# Patient Record
Sex: MALE | Race: BLACK OR AFRICAN AMERICAN | NOT HISPANIC OR LATINO | Employment: OTHER | RURAL
[De-identification: names, ages, dates, MRNs, and addresses within clinical notes are randomized per-mention and may not be internally consistent; named-entity substitution may affect disease eponyms.]

---

## 2021-06-24 ENCOUNTER — LAB REQUISITION (OUTPATIENT)
Dept: LAB | Facility: HOSPITAL | Age: 61
End: 2021-06-24
Attending: FAMILY MEDICINE
Payer: MEDICARE

## 2021-06-24 DIAGNOSIS — R41.81 AGE-RELATED COGNITIVE DECLINE: ICD-10-CM

## 2021-06-24 DIAGNOSIS — I10 ESSENTIAL (PRIMARY) HYPERTENSION: ICD-10-CM

## 2021-06-24 LAB
ALBUMIN SERPL BCP-MCNC: 4.5 G/DL (ref 3.5–5)
ALBUMIN/GLOB SERPL: 1.3 {RATIO}
ALP SERPL-CCNC: 94 U/L (ref 45–115)
ALT SERPL W P-5'-P-CCNC: 17 U/L (ref 16–61)
ANION GAP SERPL CALCULATED.3IONS-SCNC: 10 MMOL/L (ref 7–16)
AST SERPL W P-5'-P-CCNC: 13 U/L (ref 15–37)
BASOPHILS # BLD AUTO: 0.01 K/UL (ref 0–0.2)
BASOPHILS NFR BLD AUTO: 0.2 % (ref 0–1)
BILIRUB SERPL-MCNC: 0.9 MG/DL (ref 0–1.2)
BUN SERPL-MCNC: 9 MG/DL (ref 7–18)
BUN/CREAT SERPL: 8 (ref 6–20)
CALCIUM SERPL-MCNC: 10.3 MG/DL (ref 8.5–10.1)
CHLORIDE SERPL-SCNC: 106 MMOL/L (ref 98–107)
CO2 SERPL-SCNC: 34 MMOL/L (ref 21–32)
CREAT SERPL-MCNC: 1.11 MG/DL (ref 0.7–1.3)
DIFFERENTIAL METHOD BLD: ABNORMAL
EOSINOPHIL # BLD AUTO: 0.03 K/UL (ref 0–0.5)
EOSINOPHIL NFR BLD AUTO: 0.5 % (ref 1–4)
ERYTHROCYTE [DISTWIDTH] IN BLOOD BY AUTOMATED COUNT: 15.4 % (ref 11.5–14.5)
GLOBULIN SER-MCNC: 3.6 G/DL (ref 2–4)
GLUCOSE SERPL-MCNC: 87 MG/DL (ref 74–106)
HCT VFR BLD AUTO: 42.2 % (ref 40–54)
HGB BLD-MCNC: 14.4 G/DL (ref 13.5–18)
LYMPHOCYTES # BLD AUTO: 1.23 K/UL (ref 1–4.8)
LYMPHOCYTES NFR BLD AUTO: 20.2 % (ref 27–41)
MCH RBC QN AUTO: 26.2 PG (ref 27–31)
MCHC RBC AUTO-ENTMCNC: 34.1 G/DL (ref 32–36)
MCV RBC AUTO: 76.7 FL (ref 80–96)
MONOCYTES # BLD AUTO: 0.55 K/UL (ref 0–0.8)
MONOCYTES NFR BLD AUTO: 9 % (ref 2–6)
MPC BLD CALC-MCNC: 10.7 FL (ref 9.4–12.4)
NEUTROPHILS # BLD AUTO: 4.27 K/UL (ref 1.8–7.7)
NEUTROPHILS NFR BLD AUTO: 70.1 % (ref 53–65)
PLATELET # BLD AUTO: 158 K/UL (ref 150–400)
POTASSIUM SERPL-SCNC: 3.7 MMOL/L (ref 3.5–5.1)
PROT SERPL-MCNC: 8.1 G/DL (ref 6.4–8.2)
RBC # BLD AUTO: 5.5 M/UL (ref 4.6–6.2)
SODIUM SERPL-SCNC: 146 MMOL/L (ref 136–145)
WBC # BLD AUTO: 6.09 K/UL (ref 4.5–11)

## 2021-06-24 PROCEDURE — 80053 COMPREHEN METABOLIC PANEL: CPT | Performed by: FAMILY MEDICINE

## 2021-06-24 PROCEDURE — 85025 COMPLETE CBC W/AUTO DIFF WBC: CPT | Performed by: FAMILY MEDICINE

## 2021-08-26 ENCOUNTER — INFUSION (OUTPATIENT)
Dept: INFECTIOUS DISEASES | Facility: HOSPITAL | Age: 61
End: 2021-08-26
Attending: EMERGENCY MEDICINE
Payer: MEDICARE

## 2021-08-26 VITALS
OXYGEN SATURATION: 94 % | HEART RATE: 93 BPM | DIASTOLIC BLOOD PRESSURE: 71 MMHG | BODY MASS INDEX: 23.33 KG/M2 | HEIGHT: 73 IN | SYSTOLIC BLOOD PRESSURE: 112 MMHG | RESPIRATION RATE: 16 BRPM | TEMPERATURE: 99 F | WEIGHT: 176 LBS

## 2021-08-26 DIAGNOSIS — R06.02 SHORTNESS OF BREATH: Primary | ICD-10-CM

## 2021-08-26 PROCEDURE — 25000003 PHARM REV CODE 250: Performed by: NURSE PRACTITIONER

## 2021-08-26 PROCEDURE — 63600175 PHARM REV CODE 636 W HCPCS: Performed by: NURSE PRACTITIONER

## 2021-08-26 PROCEDURE — M0243 CASIRIVI AND IMDEVI INFUSION: HCPCS | Performed by: NURSE PRACTITIONER

## 2021-08-26 RX ORDER — ZINC GLUCONATE 50 MG
50 TABLET ORAL DAILY
Status: ON HOLD | COMMUNITY
End: 2021-09-27 | Stop reason: HOSPADM

## 2021-08-26 RX ORDER — IBUPROFEN 100 MG/5ML
1000 SUSPENSION, ORAL (FINAL DOSE FORM) ORAL DAILY
Status: ON HOLD | COMMUNITY
End: 2021-09-13

## 2021-08-26 RX ORDER — ONDANSETRON 4 MG/1
4 TABLET, ORALLY DISINTEGRATING ORAL ONCE AS NEEDED
Status: DISCONTINUED | OUTPATIENT
Start: 2021-08-26 | End: 2021-09-27 | Stop reason: HOSPADM

## 2021-08-26 RX ORDER — QUETIAPINE FUMARATE 300 MG/1
TABLET, FILM COATED ORAL
COMMUNITY
End: 2022-01-29

## 2021-08-26 RX ORDER — ACETAMINOPHEN 325 MG/1
650 TABLET ORAL ONCE AS NEEDED
Status: DISCONTINUED | OUTPATIENT
Start: 2021-08-26 | End: 2021-09-13

## 2021-08-26 RX ORDER — SODIUM CHLORIDE 0.9 % (FLUSH) 0.9 %
10 SYRINGE (ML) INJECTION
Status: DISCONTINUED | OUTPATIENT
Start: 2021-08-26 | End: 2021-09-27 | Stop reason: HOSPADM

## 2021-08-26 RX ORDER — RISPERIDONE 0.5 MG/1
TABLET ORAL
COMMUNITY
End: 2022-01-29

## 2021-08-26 RX ORDER — MEMANTINE HYDROCHLORIDE 5 MG/1
5 TABLET ORAL 2 TIMES DAILY
Status: ON HOLD | COMMUNITY
End: 2021-09-13

## 2021-08-26 RX ORDER — FAMOTIDINE 40 MG/1
40 TABLET, FILM COATED ORAL DAILY
Status: ON HOLD | COMMUNITY
End: 2021-09-13

## 2021-08-26 RX ORDER — ALBUTEROL SULFATE 90 UG/1
2 AEROSOL, METERED RESPIRATORY (INHALATION)
Status: DISCONTINUED | OUTPATIENT
Start: 2021-08-26 | End: 2021-09-13

## 2021-08-26 RX ORDER — EPINEPHRINE 0.3 MG/.3ML
0.3 INJECTION SUBCUTANEOUS
Status: DISCONTINUED | OUTPATIENT
Start: 2021-08-26 | End: 2021-09-13

## 2021-08-26 RX ORDER — DIPHENHYDRAMINE HYDROCHLORIDE 50 MG/ML
25 INJECTION INTRAMUSCULAR; INTRAVENOUS ONCE AS NEEDED
Status: DISCONTINUED | OUTPATIENT
Start: 2021-08-26 | End: 2021-09-13

## 2021-08-26 RX ADMIN — CASIRIVIMAB AND IMDEVIMAB 600 MG: 600; 600 INJECTION, SOLUTION, CONCENTRATE INTRAVENOUS at 01:08

## 2021-09-10 ENCOUNTER — LAB REQUISITION (OUTPATIENT)
Dept: LAB | Facility: HOSPITAL | Age: 61
End: 2021-09-10
Attending: FAMILY MEDICINE
Payer: MEDICARE

## 2021-09-10 DIAGNOSIS — N39.0 URINARY TRACT INFECTION, SITE NOT SPECIFIED: ICD-10-CM

## 2021-09-10 LAB
BILIRUB UR QL STRIP: NEGATIVE
CLARITY UR: CLEAR
COLOR UR: YELLOW
GLUCOSE UR STRIP-MCNC: NEGATIVE MG/DL
KETONES UR STRIP-SCNC: NEGATIVE MG/DL
LEUKOCYTE ESTERASE UR QL STRIP: NEGATIVE
NITRITE UR QL STRIP: NEGATIVE
PH UR STRIP: 5 PH UNITS
PROT UR QL STRIP: NEGATIVE
RBC # UR STRIP: NEGATIVE /UL
SP GR UR STRIP: 1.01
UROBILINOGEN UR STRIP-ACNC: 0.2 MG/DL

## 2021-09-10 PROCEDURE — 81003 URINALYSIS AUTO W/O SCOPE: CPT

## 2021-09-10 PROCEDURE — 87086 URINE CULTURE/COLONY COUNT: CPT

## 2021-09-13 ENCOUNTER — HOSPITAL ENCOUNTER (INPATIENT)
Facility: HOSPITAL | Age: 61
LOS: 14 days | Discharge: NURSING, REHAB OR LONG-TERM CARE | DRG: 640 | End: 2021-09-27
Attending: INTERNAL MEDICINE | Admitting: INTERNAL MEDICINE
Payer: MEDICARE

## 2021-09-13 ENCOUNTER — HOSPITAL ENCOUNTER (EMERGENCY)
Facility: HOSPITAL | Age: 61
Discharge: ANOTHER HOSPITAL | End: 2021-09-13
Payer: MEDICARE

## 2021-09-13 VITALS
TEMPERATURE: 99 F | WEIGHT: 158 LBS | OXYGEN SATURATION: 100 % | SYSTOLIC BLOOD PRESSURE: 117 MMHG | BODY MASS INDEX: 20.28 KG/M2 | DIASTOLIC BLOOD PRESSURE: 92 MMHG | RESPIRATION RATE: 16 BRPM | HEART RATE: 101 BPM | HEIGHT: 74 IN

## 2021-09-13 DIAGNOSIS — E87.0 HYPERNATREMIA: Primary | ICD-10-CM

## 2021-09-13 DIAGNOSIS — R53.1 WEAKNESS: ICD-10-CM

## 2021-09-13 DIAGNOSIS — R13.10 DYSPHAGIA, UNSPECIFIED TYPE: Primary | ICD-10-CM

## 2021-09-13 DIAGNOSIS — R41.82 ALTERED MENTAL STATUS, UNSPECIFIED ALTERED MENTAL STATUS TYPE: ICD-10-CM

## 2021-09-13 DIAGNOSIS — R00.0 TACHYCARDIA: ICD-10-CM

## 2021-09-13 DIAGNOSIS — E86.0 DEHYDRATION WITH HYPERNATREMIA: ICD-10-CM

## 2021-09-13 DIAGNOSIS — E87.0 DEHYDRATION WITH HYPERNATREMIA: ICD-10-CM

## 2021-09-13 DIAGNOSIS — N17.9 ACUTE RENAL FAILURE, UNSPECIFIED ACUTE RENAL FAILURE TYPE: ICD-10-CM

## 2021-09-13 DIAGNOSIS — R10.9 ABDOMINAL PAIN: ICD-10-CM

## 2021-09-13 LAB
ALBUMIN SERPL BCP-MCNC: 3.7 G/DL (ref 3.5–5)
ALBUMIN/GLOB SERPL: 0.7 {RATIO}
ALP SERPL-CCNC: 121 U/L (ref 45–115)
ALT SERPL W P-5'-P-CCNC: 49 U/L (ref 16–61)
AMORPH PHOS CRY #/AREA URNS LPF: ABNORMAL /LPF
ANION GAP SERPL CALCULATED.3IONS-SCNC: 18 MMOL/L (ref 7–16)
ANISOCYTOSIS BLD QL SMEAR: ABNORMAL
AST SERPL W P-5'-P-CCNC: 39 U/L (ref 15–37)
BACTERIA #/AREA URNS HPF: ABNORMAL /HPF
BASOPHILS # BLD AUTO: 0.01 K/UL (ref 0–0.2)
BASOPHILS NFR BLD AUTO: 0.1 % (ref 0–1)
BILIRUB SERPL-MCNC: 1.4 MG/DL (ref 0–1.2)
BILIRUB UR QL STRIP: NEGATIVE
BUN SERPL-MCNC: 67 MG/DL (ref 7–18)
BUN/CREAT SERPL: 25 (ref 6–20)
CALCIUM SERPL-MCNC: 11 MG/DL (ref 8.5–10.1)
CHLORIDE SERPL-SCNC: 129 MMOL/L (ref 98–107)
CLARITY UR: ABNORMAL
CO2 SERPL-SCNC: 31 MMOL/L (ref 21–32)
COLOR UR: YELLOW
CREAT SERPL-MCNC: 2.66 MG/DL (ref 0.7–1.3)
DIFFERENTIAL METHOD BLD: ABNORMAL
EOSINOPHIL # BLD AUTO: 0.03 K/UL (ref 0–0.5)
EOSINOPHIL NFR BLD AUTO: 0.2 % (ref 1–4)
ERYTHROCYTE [DISTWIDTH] IN BLOOD BY AUTOMATED COUNT: 18.2 % (ref 11.5–14.5)
GLOBULIN SER-MCNC: 5 G/DL (ref 2–4)
GLUCOSE SERPL-MCNC: 145 MG/DL (ref 74–106)
GLUCOSE UR STRIP-MCNC: NEGATIVE MG/DL
HCT VFR BLD AUTO: 46.5 % (ref 40–54)
HGB BLD-MCNC: 15.3 G/DL (ref 13.5–18)
HYPOCHROMIA BLD QL SMEAR: ABNORMAL
KETONES UR STRIP-SCNC: NEGATIVE MG/DL
LACTATE SERPL-SCNC: 2.2 MMOL/L (ref 0.4–2)
LACTATE SERPL-SCNC: 2.2 MMOL/L (ref 0.4–2)
LEUKOCYTE ESTERASE UR QL STRIP: NEGATIVE
LYMPHOCYTES # BLD AUTO: 1.03 K/UL (ref 1–4.8)
LYMPHOCYTES NFR BLD AUTO: 7.1 % (ref 27–41)
LYMPHOCYTES NFR BLD MANUAL: 7 % (ref 27–41)
MAGNESIUM SERPL-MCNC: 3.1 MG/DL (ref 1.7–2.3)
MCH RBC QN AUTO: 26.3 PG (ref 27–31)
MCHC RBC AUTO-ENTMCNC: 32.9 G/DL (ref 32–36)
MCV RBC AUTO: 79.9 FL (ref 80–96)
MONOCYTES # BLD AUTO: 1.17 K/UL (ref 0–0.8)
MONOCYTES NFR BLD AUTO: 8 % (ref 2–6)
MONOCYTES NFR BLD MANUAL: 8 % (ref 2–6)
MPC BLD CALC-MCNC: 12.7 FL (ref 9.4–12.4)
NEUTROPHILS # BLD AUTO: 12.34 K/UL (ref 1.8–7.7)
NEUTROPHILS NFR BLD AUTO: 84.6 % (ref 53–65)
NEUTS BAND NFR BLD MANUAL: 1 % (ref 1–5)
NEUTS SEG NFR BLD MANUAL: 84 % (ref 50–62)
NITRITE UR QL STRIP: NEGATIVE
NRBC BLD MANUAL-RTO: ABNORMAL %
PH UR STRIP: 5 PH UNITS
PLATELET # BLD AUTO: 152 K/UL (ref 150–400)
PLATELET MORPHOLOGY: ABNORMAL
POTASSIUM SERPL-SCNC: 3.5 MMOL/L (ref 3.5–5.1)
PROT SERPL-MCNC: 8.7 G/DL (ref 6.4–8.2)
PROT UR QL STRIP: NEGATIVE
RBC # BLD AUTO: 5.82 M/UL (ref 4.6–6.2)
RBC # UR STRIP: ABNORMAL /UL
RBC #/AREA URNS HPF: ABNORMAL /HPF
RENAL EPI CELLS #/AREA URNS LPF: ABNORMAL /LPF
SODIUM SERPL-SCNC: 174 MMOL/L (ref 136–145)
SP GR UR STRIP: 1.01
SQUAMOUS #/AREA URNS LPF: ABNORMAL /LPF
UA COMPLETE W REFLEX CULTURE PNL UR: NO GROWTH
UROBILINOGEN UR STRIP-ACNC: 0.2 MG/DL
WBC # BLD AUTO: 14.58 K/UL (ref 4.5–11)
WBC #/AREA URNS HPF: ABNORMAL /HPF

## 2021-09-13 PROCEDURE — 11000001 HC ACUTE MED/SURG PRIVATE ROOM

## 2021-09-13 PROCEDURE — 99283 EMERGENCY DEPT VISIT LOW MDM: CPT | Mod: GF,25 | Performed by: NURSE PRACTITIONER

## 2021-09-13 PROCEDURE — 87040 BLOOD CULTURE FOR BACTERIA: CPT | Performed by: NURSE PRACTITIONER

## 2021-09-13 PROCEDURE — 93010 ELECTROCARDIOGRAM REPORT: CPT | Performed by: HOSPITALIST

## 2021-09-13 PROCEDURE — 93005 ELECTROCARDIOGRAM TRACING: CPT

## 2021-09-13 PROCEDURE — 99285 EMERGENCY DEPT VISIT HI MDM: CPT | Mod: 25

## 2021-09-13 PROCEDURE — 80053 COMPREHEN METABOLIC PANEL: CPT | Performed by: NURSE PRACTITIONER

## 2021-09-13 PROCEDURE — 36415 COLL VENOUS BLD VENIPUNCTURE: CPT | Performed by: NURSE PRACTITIONER

## 2021-09-13 PROCEDURE — 85025 COMPLETE CBC W/AUTO DIFF WBC: CPT | Performed by: NURSE PRACTITIONER

## 2021-09-13 PROCEDURE — 81001 URINALYSIS AUTO W/SCOPE: CPT | Performed by: NURSE PRACTITIONER

## 2021-09-13 PROCEDURE — 83605 ASSAY OF LACTIC ACID: CPT | Performed by: NURSE PRACTITIONER

## 2021-09-13 PROCEDURE — 83735 ASSAY OF MAGNESIUM: CPT | Performed by: NURSE PRACTITIONER

## 2021-09-13 PROCEDURE — 99222 PR INITIAL HOSPITAL CARE,LEVL II: ICD-10-PCS | Mod: ,,, | Performed by: HOSPITALIST

## 2021-09-13 PROCEDURE — 25000003 PHARM REV CODE 250: Performed by: NURSE PRACTITIONER

## 2021-09-13 PROCEDURE — 81003 URINALYSIS AUTO W/O SCOPE: CPT | Performed by: NURSE PRACTITIONER

## 2021-09-13 PROCEDURE — 99222 1ST HOSP IP/OBS MODERATE 55: CPT | Mod: ,,, | Performed by: HOSPITALIST

## 2021-09-13 RX ORDER — HYDROCHLOROTHIAZIDE 12.5 MG/1
TABLET ORAL
Status: ON HOLD | COMMUNITY
Start: 2021-08-26 | End: 2021-09-13

## 2021-09-13 RX ORDER — AMLODIPINE BESYLATE 10 MG/1
TABLET ORAL
Status: ON HOLD | COMMUNITY
Start: 2021-08-11 | End: 2021-09-13

## 2021-09-13 RX ORDER — POLYETHYLENE GLYCOL 3350 17 G/17G
POWDER, FOR SOLUTION ORAL
COMMUNITY
Start: 2021-04-07 | End: 2022-01-29

## 2021-09-13 RX ORDER — SODIUM CHLORIDE 9 MG/ML
INJECTION, SOLUTION INTRAVENOUS
Status: COMPLETED | OUTPATIENT
Start: 2021-09-13 | End: 2021-09-13

## 2021-09-13 RX ORDER — DICLOFENAC SODIUM 10 MG/G
GEL TOPICAL
Status: ON HOLD | COMMUNITY
Start: 2021-06-22 | End: 2021-09-13

## 2021-09-13 RX ORDER — CYCLOBENZAPRINE HCL 10 MG
TABLET ORAL
Status: ON HOLD | COMMUNITY
Start: 2021-06-21 | End: 2021-09-13

## 2021-09-13 RX ORDER — CETIRIZINE HYDROCHLORIDE 10 MG/1
10 TABLET ORAL EVERY MORNING
Status: ON HOLD | COMMUNITY
Start: 2021-04-07 | End: 2021-09-13

## 2021-09-13 RX ORDER — SODIUM CHLORIDE 450 MG/100ML
INJECTION, SOLUTION INTRAVENOUS CONTINUOUS
Status: DISCONTINUED | OUTPATIENT
Start: 2021-09-14 | End: 2021-09-14

## 2021-09-13 RX ORDER — RISPERIDONE 0.25 MG/1
0.25 TABLET ORAL NIGHTLY
Status: ON HOLD | COMMUNITY
Start: 2021-06-15 | End: 2021-09-27 | Stop reason: HOSPADM

## 2021-09-13 RX ADMIN — SODIUM CHLORIDE: 9 INJECTION, SOLUTION INTRAVENOUS at 04:09

## 2021-09-14 ENCOUNTER — TELEPHONE (OUTPATIENT)
Dept: EMERGENCY MEDICINE | Facility: HOSPITAL | Age: 61
End: 2021-09-14

## 2021-09-14 PROBLEM — G93.41 ENCEPHALOPATHY, METABOLIC: Status: ACTIVE | Noted: 2021-09-14

## 2021-09-14 LAB
ALBUMIN SERPL BCP-MCNC: 2.9 G/DL (ref 3.5–5)
ALBUMIN/GLOB SERPL: 0.7 {RATIO}
ALP SERPL-CCNC: 107 U/L (ref 45–115)
ALT SERPL W P-5'-P-CCNC: 65 U/L (ref 16–61)
ANION GAP SERPL CALCULATED.3IONS-SCNC: 11 MMOL/L (ref 7–16)
ANION GAP SERPL CALCULATED.3IONS-SCNC: 15 MMOL/L (ref 7–16)
ANION GAP SERPL CALCULATED.3IONS-SCNC: 15 MMOL/L (ref 7–16)
APTT PPP: 34.9 SECONDS (ref 25.2–37.3)
AST SERPL W P-5'-P-CCNC: 65 U/L (ref 15–37)
BASOPHILS # BLD AUTO: 0.01 K/UL (ref 0–0.2)
BASOPHILS NFR BLD AUTO: 0.1 % (ref 0–1)
BILIRUB SERPL-MCNC: 1.2 MG/DL (ref 0–1.2)
BUN SERPL-MCNC: 3 MG/DL (ref 7–18)
BUN SERPL-MCNC: 48 MG/DL (ref 7–18)
BUN SERPL-MCNC: 60 MG/DL (ref 7–18)
BUN/CREAT SERPL: 1 (ref 6–20)
BUN/CREAT SERPL: 24 (ref 6–20)
BUN/CREAT SERPL: 27 (ref 6–20)
CALCIUM SERPL-MCNC: 10.2 MG/DL (ref 8.5–10.1)
CALCIUM SERPL-MCNC: 10.5 MG/DL (ref 8.5–10.1)
CALCIUM SERPL-MCNC: 9.6 MG/DL (ref 8.5–10.1)
CHLORIDE SERPL-SCNC: 130 MMOL/L (ref 98–107)
CHLORIDE SERPL-SCNC: 131 MMOL/L (ref 98–107)
CHLORIDE SERPL-SCNC: 132 MMOL/L (ref 98–107)
CO2 SERPL-SCNC: 26 MMOL/L (ref 21–32)
CO2 SERPL-SCNC: 28 MMOL/L (ref 21–32)
CO2 SERPL-SCNC: 28 MMOL/L (ref 21–32)
CREAT SERPL-MCNC: 1.98 MG/DL (ref 0.7–1.3)
CREAT SERPL-MCNC: 2.24 MG/DL (ref 0.7–1.3)
CREAT SERPL-MCNC: 2.51 MG/DL (ref 0.7–1.3)
DIFFERENTIAL METHOD BLD: ABNORMAL
EOSINOPHIL # BLD AUTO: 0.04 K/UL (ref 0–0.5)
EOSINOPHIL NFR BLD AUTO: 0.3 % (ref 1–4)
ERYTHROCYTE [DISTWIDTH] IN BLOOD BY AUTOMATED COUNT: 16.1 % (ref 11.5–14.5)
GLOBULIN SER-MCNC: 4.3 G/DL (ref 2–4)
GLUCOSE SERPL-MCNC: 113 MG/DL (ref 74–106)
GLUCOSE SERPL-MCNC: 119 MG/DL (ref 74–106)
GLUCOSE SERPL-MCNC: 137 MG/DL (ref 70–105)
GLUCOSE SERPL-MCNC: 141 MG/DL (ref 74–106)
GLUCOSE SERPL-MCNC: 145 MG/DL (ref 70–105)
HCT VFR BLD AUTO: 42.8 % (ref 40–54)
HGB BLD-MCNC: 14 G/DL (ref 13.5–18)
IMM GRANULOCYTES # BLD AUTO: 0.09 K/UL (ref 0–0.04)
IMM GRANULOCYTES NFR BLD: 0.7 % (ref 0–0.4)
INR BLD: 1.28 (ref 0.9–1.1)
LYMPHOCYTES # BLD AUTO: 1.19 K/UL (ref 1–4.8)
LYMPHOCYTES NFR BLD AUTO: 8.6 % (ref 27–41)
MAGNESIUM SERPL-MCNC: 3.1 MG/DL (ref 1.7–2.3)
MCH RBC QN AUTO: 25.9 PG (ref 27–31)
MCHC RBC AUTO-ENTMCNC: 32.7 G/DL (ref 32–36)
MCV RBC AUTO: 79.1 FL (ref 80–96)
MONOCYTES # BLD AUTO: 1.09 K/UL (ref 0–0.8)
MONOCYTES NFR BLD AUTO: 7.9 % (ref 2–6)
MPC BLD CALC-MCNC: 13.2 FL (ref 9.4–12.4)
NEUTROPHILS # BLD AUTO: 11.39 K/UL (ref 1.8–7.7)
NEUTROPHILS NFR BLD AUTO: 82.4 % (ref 53–65)
NRBC # BLD AUTO: 0.04 X10E3/UL
NRBC, AUTO (.00): 0.3 %
PLATELET # BLD AUTO: 133 K/UL (ref 150–400)
POTASSIUM SERPL-SCNC: 3 MMOL/L (ref 3.5–5.1)
POTASSIUM SERPL-SCNC: 3.3 MMOL/L (ref 3.5–5.1)
POTASSIUM SERPL-SCNC: 3.7 MMOL/L (ref 3.5–5.1)
PROT SERPL-MCNC: 7.2 G/DL (ref 6.4–8.2)
PROTHROMBIN TIME: 15.9 SECONDS (ref 11.7–14.7)
RBC # BLD AUTO: 5.41 M/UL (ref 4.6–6.2)
SODIUM SERPL-SCNC: 166 MMOL/L (ref 136–145)
SODIUM SERPL-SCNC: 169 MMOL/L (ref 136–145)
SODIUM SERPL-SCNC: 171 MMOL/L (ref 136–145)
WBC # BLD AUTO: 13.81 K/UL (ref 4.5–11)

## 2021-09-14 PROCEDURE — 85610 PROTHROMBIN TIME: CPT | Performed by: HOSPITALIST

## 2021-09-14 PROCEDURE — 82962 GLUCOSE BLOOD TEST: CPT

## 2021-09-14 PROCEDURE — 99233 PR SUBSEQUENT HOSPITAL CARE,LEVL III: ICD-10-PCS | Mod: ,,, | Performed by: INTERNAL MEDICINE

## 2021-09-14 PROCEDURE — 25000003 PHARM REV CODE 250: Performed by: HOSPITALIST

## 2021-09-14 PROCEDURE — 36415 COLL VENOUS BLD VENIPUNCTURE: CPT | Performed by: INTERNAL MEDICINE

## 2021-09-14 PROCEDURE — 36415 COLL VENOUS BLD VENIPUNCTURE: CPT | Performed by: HOSPITALIST

## 2021-09-14 PROCEDURE — 63600175 PHARM REV CODE 636 W HCPCS: Performed by: HOSPITALIST

## 2021-09-14 PROCEDURE — 80048 BASIC METABOLIC PNL TOTAL CA: CPT | Mod: XB | Performed by: INTERNAL MEDICINE

## 2021-09-14 PROCEDURE — 83735 ASSAY OF MAGNESIUM: CPT | Performed by: HOSPITALIST

## 2021-09-14 PROCEDURE — 85730 THROMBOPLASTIN TIME PARTIAL: CPT | Performed by: HOSPITALIST

## 2021-09-14 PROCEDURE — 11000001 HC ACUTE MED/SURG PRIVATE ROOM

## 2021-09-14 PROCEDURE — 85025 COMPLETE CBC W/AUTO DIFF WBC: CPT | Performed by: HOSPITALIST

## 2021-09-14 PROCEDURE — 25000003 PHARM REV CODE 250: Performed by: INTERNAL MEDICINE

## 2021-09-14 PROCEDURE — 63600175 PHARM REV CODE 636 W HCPCS: Performed by: INTERNAL MEDICINE

## 2021-09-14 PROCEDURE — 99233 SBSQ HOSP IP/OBS HIGH 50: CPT | Mod: ,,, | Performed by: INTERNAL MEDICINE

## 2021-09-14 PROCEDURE — 84075 ASSAY ALKALINE PHOSPHATASE: CPT | Performed by: HOSPITALIST

## 2021-09-14 PROCEDURE — 80053 COMPREHEN METABOLIC PANEL: CPT | Performed by: HOSPITALIST

## 2021-09-14 RX ORDER — SIMETHICONE 80 MG
1 TABLET,CHEWABLE ORAL 3 TIMES DAILY PRN
Status: DISCONTINUED | OUTPATIENT
Start: 2021-09-14 | End: 2021-09-27 | Stop reason: HOSPADM

## 2021-09-14 RX ORDER — ONDANSETRON 2 MG/ML
8 INJECTION INTRAMUSCULAR; INTRAVENOUS EVERY 6 HOURS PRN
Status: DISCONTINUED | OUTPATIENT
Start: 2021-09-14 | End: 2021-09-27 | Stop reason: HOSPADM

## 2021-09-14 RX ORDER — GUAIFENESIN/DEXTROMETHORPHAN 100-10MG/5
10 SYRUP ORAL EVERY 6 HOURS PRN
Status: DISCONTINUED | OUTPATIENT
Start: 2021-09-14 | End: 2021-09-27 | Stop reason: HOSPADM

## 2021-09-14 RX ORDER — HEPARIN SODIUM 5000 [USP'U]/ML
5000 INJECTION, SOLUTION INTRAVENOUS; SUBCUTANEOUS EVERY 8 HOURS
Status: DISCONTINUED | OUTPATIENT
Start: 2021-09-14 | End: 2021-09-27 | Stop reason: HOSPADM

## 2021-09-14 RX ORDER — ACETAMINOPHEN 500 MG
1000 TABLET ORAL EVERY 6 HOURS PRN
Status: DISCONTINUED | OUTPATIENT
Start: 2021-09-14 | End: 2021-09-27 | Stop reason: HOSPADM

## 2021-09-14 RX ORDER — POTASSIUM CHLORIDE 7.45 MG/ML
30 INJECTION INTRAVENOUS ONCE
Status: COMPLETED | OUTPATIENT
Start: 2021-09-14 | End: 2021-09-14

## 2021-09-14 RX ORDER — BISACODYL 5 MG
10 TABLET, DELAYED RELEASE (ENTERIC COATED) ORAL DAILY PRN
Status: DISCONTINUED | OUTPATIENT
Start: 2021-09-14 | End: 2021-09-27 | Stop reason: HOSPADM

## 2021-09-14 RX ORDER — TRAZODONE HYDROCHLORIDE 50 MG/1
50 TABLET ORAL NIGHTLY PRN
Status: DISCONTINUED | OUTPATIENT
Start: 2021-09-14 | End: 2021-09-27 | Stop reason: HOSPADM

## 2021-09-14 RX ORDER — BISACODYL 10 MG
10 SUPPOSITORY, RECTAL RECTAL DAILY
Status: DISCONTINUED | OUTPATIENT
Start: 2021-09-14 | End: 2021-09-27 | Stop reason: HOSPADM

## 2021-09-14 RX ORDER — DEXTROSE MONOHYDRATE 50 MG/ML
INJECTION, SOLUTION INTRAVENOUS CONTINUOUS
Status: DISCONTINUED | OUTPATIENT
Start: 2021-09-14 | End: 2021-09-17

## 2021-09-14 RX ADMIN — HEPARIN SODIUM 5000 UNITS: 5000 INJECTION INTRAVENOUS; SUBCUTANEOUS at 06:09

## 2021-09-14 RX ADMIN — DEXTROSE MONOHYDRATE: 50 INJECTION, SOLUTION INTRAVENOUS at 10:09

## 2021-09-14 RX ADMIN — SODIUM CHLORIDE, POTASSIUM CHLORIDE, SODIUM LACTATE AND CALCIUM CHLORIDE 1000 ML: 600; 310; 30; 20 INJECTION, SOLUTION INTRAVENOUS at 12:09

## 2021-09-14 RX ADMIN — BISACODYL 10 MG: 10 SUPPOSITORY RECTAL at 06:09

## 2021-09-14 RX ADMIN — POTASSIUM CHLORIDE 30 MEQ: 7.46 INJECTION, SOLUTION INTRAVENOUS at 11:09

## 2021-09-14 RX ADMIN — HEPARIN SODIUM 5000 UNITS: 5000 INJECTION INTRAVENOUS; SUBCUTANEOUS at 02:09

## 2021-09-14 RX ADMIN — HEPARIN SODIUM 5000 UNITS: 5000 INJECTION INTRAVENOUS; SUBCUTANEOUS at 09:09

## 2021-09-14 RX ADMIN — DEXTROSE MONOHYDRATE: 50 INJECTION, SOLUTION INTRAVENOUS at 09:09

## 2021-09-14 RX ADMIN — SODIUM CHLORIDE: 4.5 INJECTION, SOLUTION INTRAVENOUS at 07:09

## 2021-09-14 RX ADMIN — DEXTROSE MONOHYDRATE: 50 INJECTION, SOLUTION INTRAVENOUS at 03:09

## 2021-09-14 RX ADMIN — SODIUM CHLORIDE: 4.5 INJECTION, SOLUTION INTRAVENOUS at 02:09

## 2021-09-15 PROBLEM — E87.6 HYPOKALEMIA: Status: ACTIVE | Noted: 2021-09-15

## 2021-09-15 PROBLEM — K59.00 CONSTIPATION: Status: ACTIVE | Noted: 2021-09-15

## 2021-09-15 LAB
ALBUMIN SERPL BCP-MCNC: 2.7 G/DL (ref 3.5–5)
ALBUMIN/GLOB SERPL: 0.7 {RATIO}
ALP SERPL-CCNC: 100 U/L (ref 45–115)
ALT SERPL W P-5'-P-CCNC: 64 U/L (ref 16–61)
ANION GAP SERPL CALCULATED.3IONS-SCNC: 11 MMOL/L (ref 7–16)
ANION GAP SERPL CALCULATED.3IONS-SCNC: 13 MMOL/L (ref 7–16)
AST SERPL W P-5'-P-CCNC: 54 U/L (ref 15–37)
BASOPHILS # BLD AUTO: 0.02 K/UL (ref 0–0.2)
BASOPHILS NFR BLD AUTO: 0.2 % (ref 0–1)
BILIRUB SERPL-MCNC: 1.1 MG/DL (ref 0–1.2)
BUN SERPL-MCNC: 30 MG/DL (ref 7–18)
BUN SERPL-MCNC: 41 MG/DL (ref 7–18)
BUN/CREAT SERPL: 18 (ref 6–20)
BUN/CREAT SERPL: 22 (ref 6–20)
CALCIUM SERPL-MCNC: 10.1 MG/DL (ref 8.5–10.1)
CALCIUM SERPL-MCNC: 10.4 MG/DL (ref 8.5–10.1)
CHLORIDE SERPL-SCNC: 121 MMOL/L (ref 98–107)
CHLORIDE SERPL-SCNC: 127 MMOL/L (ref 98–107)
CO2 SERPL-SCNC: 26 MMOL/L (ref 21–32)
CO2 SERPL-SCNC: 30 MMOL/L (ref 21–32)
CREAT SERPL-MCNC: 1.69 MG/DL (ref 0.7–1.3)
CREAT SERPL-MCNC: 1.85 MG/DL (ref 0.7–1.3)
DIFFERENTIAL METHOD BLD: ABNORMAL
EOSINOPHIL # BLD AUTO: 0.16 K/UL (ref 0–0.5)
EOSINOPHIL NFR BLD AUTO: 1.5 % (ref 1–4)
ERYTHROCYTE [DISTWIDTH] IN BLOOD BY AUTOMATED COUNT: 16.1 % (ref 11.5–14.5)
GLOBULIN SER-MCNC: 4.1 G/DL (ref 2–4)
GLUCOSE SERPL-MCNC: 105 MG/DL (ref 70–105)
GLUCOSE SERPL-MCNC: 105 MG/DL (ref 74–106)
GLUCOSE SERPL-MCNC: 111 MG/DL (ref 70–105)
GLUCOSE SERPL-MCNC: 114 MG/DL (ref 74–106)
GLUCOSE SERPL-MCNC: 127 MG/DL (ref 70–105)
GLUCOSE SERPL-MCNC: 129 MG/DL (ref 70–105)
GLUCOSE SERPL-MCNC: 134 MG/DL (ref 70–105)
HCT VFR BLD AUTO: 42.4 % (ref 40–54)
HGB BLD-MCNC: 13.9 G/DL (ref 13.5–18)
IMM GRANULOCYTES # BLD AUTO: 0.07 K/UL (ref 0–0.04)
IMM GRANULOCYTES NFR BLD: 0.6 % (ref 0–0.4)
LYMPHOCYTES # BLD AUTO: 1.11 K/UL (ref 1–4.8)
LYMPHOCYTES NFR BLD AUTO: 10.2 % (ref 27–41)
MCH RBC QN AUTO: 26 PG (ref 27–31)
MCHC RBC AUTO-ENTMCNC: 32.8 G/DL (ref 32–36)
MCV RBC AUTO: 79.3 FL (ref 80–96)
MONOCYTES # BLD AUTO: 0.79 K/UL (ref 0–0.8)
MONOCYTES NFR BLD AUTO: 7.3 % (ref 2–6)
MPC BLD CALC-MCNC: ABNORMAL G/DL
NEUTROPHILS # BLD AUTO: 8.72 K/UL (ref 1.8–7.7)
NEUTROPHILS NFR BLD AUTO: 80.2 % (ref 53–65)
NRBC # BLD AUTO: 0.03 X10E3/UL
NRBC, AUTO (.00): 0.3 %
PLATELET # BLD AUTO: 114 K/UL (ref 150–400)
POTASSIUM SERPL-SCNC: 3.1 MMOL/L (ref 3.5–5.1)
POTASSIUM SERPL-SCNC: 3.6 MMOL/L (ref 3.5–5.1)
PROT SERPL-MCNC: 6.8 G/DL (ref 6.4–8.2)
RBC # BLD AUTO: 5.35 M/UL (ref 4.6–6.2)
SODIUM SERPL-SCNC: 158 MMOL/L (ref 136–145)
SODIUM SERPL-SCNC: 163 MMOL/L (ref 136–145)
WBC # BLD AUTO: 10.87 K/UL (ref 4.5–11)

## 2021-09-15 PROCEDURE — 63600175 PHARM REV CODE 636 W HCPCS: Performed by: INTERNAL MEDICINE

## 2021-09-15 PROCEDURE — 99232 SBSQ HOSP IP/OBS MODERATE 35: CPT | Mod: ,,, | Performed by: INTERNAL MEDICINE

## 2021-09-15 PROCEDURE — 85025 COMPLETE CBC W/AUTO DIFF WBC: CPT | Performed by: INTERNAL MEDICINE

## 2021-09-15 PROCEDURE — 92610 EVALUATE SWALLOWING FUNCTION: CPT

## 2021-09-15 PROCEDURE — 80048 BASIC METABOLIC PNL TOTAL CA: CPT | Mod: XB | Performed by: INTERNAL MEDICINE

## 2021-09-15 PROCEDURE — 36415 COLL VENOUS BLD VENIPUNCTURE: CPT | Performed by: INTERNAL MEDICINE

## 2021-09-15 PROCEDURE — 82962 GLUCOSE BLOOD TEST: CPT

## 2021-09-15 PROCEDURE — 99232 PR SUBSEQUENT HOSPITAL CARE,LEVL II: ICD-10-PCS | Mod: ,,, | Performed by: INTERNAL MEDICINE

## 2021-09-15 PROCEDURE — 25000003 PHARM REV CODE 250: Performed by: HOSPITALIST

## 2021-09-15 PROCEDURE — 25000003 PHARM REV CODE 250: Performed by: INTERNAL MEDICINE

## 2021-09-15 PROCEDURE — 80053 COMPREHEN METABOLIC PANEL: CPT | Performed by: INTERNAL MEDICINE

## 2021-09-15 PROCEDURE — 11000001 HC ACUTE MED/SURG PRIVATE ROOM

## 2021-09-15 PROCEDURE — 63600175 PHARM REV CODE 636 W HCPCS: Performed by: HOSPITALIST

## 2021-09-15 RX ORDER — POTASSIUM CHLORIDE 20 MEQ/1
40 TABLET, EXTENDED RELEASE ORAL ONCE
Status: DISCONTINUED | OUTPATIENT
Start: 2021-09-15 | End: 2021-09-15

## 2021-09-15 RX ORDER — POTASSIUM CHLORIDE 7.45 MG/ML
20 INJECTION INTRAVENOUS ONCE
Status: COMPLETED | OUTPATIENT
Start: 2021-09-15 | End: 2021-09-15

## 2021-09-15 RX ORDER — POTASSIUM CHLORIDE 7.45 MG/ML
10 INJECTION INTRAVENOUS ONCE
Status: COMPLETED | OUTPATIENT
Start: 2021-09-15 | End: 2021-09-15

## 2021-09-15 RX ADMIN — DEXTROSE MONOHYDRATE: 50 INJECTION, SOLUTION INTRAVENOUS at 08:09

## 2021-09-15 RX ADMIN — POTASSIUM CHLORIDE 20 MEQ: 7.46 INJECTION, SOLUTION INTRAVENOUS at 01:09

## 2021-09-15 RX ADMIN — HEPARIN SODIUM 5000 UNITS: 5000 INJECTION INTRAVENOUS; SUBCUTANEOUS at 01:09

## 2021-09-15 RX ADMIN — DEXTROSE MONOHYDRATE: 50 INJECTION, SOLUTION INTRAVENOUS at 05:09

## 2021-09-15 RX ADMIN — POTASSIUM CHLORIDE 10 MEQ: 7.46 INJECTION, SOLUTION INTRAVENOUS at 12:09

## 2021-09-15 RX ADMIN — HEPARIN SODIUM 5000 UNITS: 5000 INJECTION INTRAVENOUS; SUBCUTANEOUS at 09:09

## 2021-09-15 RX ADMIN — DEXTROSE MONOHYDRATE: 50 INJECTION, SOLUTION INTRAVENOUS at 01:09

## 2021-09-15 RX ADMIN — HEPARIN SODIUM 5000 UNITS: 5000 INJECTION INTRAVENOUS; SUBCUTANEOUS at 05:09

## 2021-09-15 RX ADMIN — BISACODYL 10 MG: 10 SUPPOSITORY RECTAL at 09:09

## 2021-09-16 LAB
ALBUMIN SERPL BCP-MCNC: 2.5 G/DL (ref 3.5–5)
ALBUMIN/GLOB SERPL: 0.6 {RATIO}
ALP SERPL-CCNC: 98 U/L (ref 45–115)
ALT SERPL W P-5'-P-CCNC: 58 U/L (ref 16–61)
ANION GAP SERPL CALCULATED.3IONS-SCNC: 11 MMOL/L (ref 7–16)
ANION GAP SERPL CALCULATED.3IONS-SCNC: 9 MMOL/L (ref 7–16)
AST SERPL W P-5'-P-CCNC: 59 U/L (ref 15–37)
BASOPHILS # BLD AUTO: 0.01 K/UL (ref 0–0.2)
BASOPHILS NFR BLD AUTO: 0.1 % (ref 0–1)
BILIRUB SERPL-MCNC: 1.2 MG/DL (ref 0–1.2)
BUN SERPL-MCNC: 24 MG/DL (ref 7–18)
BUN SERPL-MCNC: 25 MG/DL (ref 7–18)
BUN/CREAT SERPL: 15 (ref 6–20)
BUN/CREAT SERPL: 15 (ref 6–20)
CALCIUM SERPL-MCNC: 9.5 MG/DL (ref 8.5–10.1)
CALCIUM SERPL-MCNC: 9.6 MG/DL (ref 8.5–10.1)
CHLORIDE SERPL-SCNC: 120 MMOL/L (ref 98–107)
CHLORIDE SERPL-SCNC: 122 MMOL/L (ref 98–107)
CO2 SERPL-SCNC: 25 MMOL/L (ref 21–32)
CO2 SERPL-SCNC: 27 MMOL/L (ref 21–32)
CREAT SERPL-MCNC: 1.6 MG/DL (ref 0.7–1.3)
CREAT SERPL-MCNC: 1.66 MG/DL (ref 0.7–1.3)
DIFFERENTIAL METHOD BLD: ABNORMAL
EOSINOPHIL # BLD AUTO: 0.15 K/UL (ref 0–0.5)
EOSINOPHIL NFR BLD AUTO: 1.8 % (ref 1–4)
ERYTHROCYTE [DISTWIDTH] IN BLOOD BY AUTOMATED COUNT: 15.9 % (ref 11.5–14.5)
GLOBULIN SER-MCNC: 4.1 G/DL (ref 2–4)
GLUCOSE SERPL-MCNC: 104 MG/DL (ref 74–106)
GLUCOSE SERPL-MCNC: 110 MG/DL (ref 70–105)
GLUCOSE SERPL-MCNC: 114 MG/DL (ref 70–105)
GLUCOSE SERPL-MCNC: 127 MG/DL (ref 74–106)
GLUCOSE SERPL-MCNC: 92 MG/DL (ref 70–105)
GLUCOSE SERPL-MCNC: 92 MG/DL (ref 70–105)
HCT VFR BLD AUTO: 40.6 % (ref 40–54)
HGB BLD-MCNC: 13.2 G/DL (ref 13.5–18)
IMM GRANULOCYTES # BLD AUTO: 0.09 K/UL (ref 0–0.04)
IMM GRANULOCYTES NFR BLD: 1.1 % (ref 0–0.4)
LYMPHOCYTES # BLD AUTO: 1.1 K/UL (ref 1–4.8)
LYMPHOCYTES NFR BLD AUTO: 13.1 % (ref 27–41)
MAGNESIUM SERPL-MCNC: 2.2 MG/DL (ref 1.7–2.3)
MCH RBC QN AUTO: 25.6 PG (ref 27–31)
MCHC RBC AUTO-ENTMCNC: 32.5 G/DL (ref 32–36)
MCV RBC AUTO: 78.8 FL (ref 80–96)
MONOCYTES # BLD AUTO: 0.7 K/UL (ref 0–0.8)
MONOCYTES NFR BLD AUTO: 8.3 % (ref 2–6)
MPC BLD CALC-MCNC: ABNORMAL G/DL
NEUTROPHILS # BLD AUTO: 6.34 K/UL (ref 1.8–7.7)
NEUTROPHILS NFR BLD AUTO: 75.6 % (ref 53–65)
NRBC # BLD AUTO: 0.03 X10E3/UL
NRBC, AUTO (.00): 0.4 %
PLATELET # BLD AUTO: 99 K/UL (ref 150–400)
POTASSIUM SERPL-SCNC: 2.9 MMOL/L (ref 3.5–5.1)
POTASSIUM SERPL-SCNC: 3.4 MMOL/L (ref 3.5–5.1)
PROT SERPL-MCNC: 6.6 G/DL (ref 6.4–8.2)
RBC # BLD AUTO: 5.15 M/UL (ref 4.6–6.2)
SODIUM SERPL-SCNC: 153 MMOL/L (ref 136–145)
SODIUM SERPL-SCNC: 155 MMOL/L (ref 136–145)
WBC # BLD AUTO: 8.39 K/UL (ref 4.5–11)

## 2021-09-16 PROCEDURE — 25000003 PHARM REV CODE 250: Performed by: FAMILY MEDICINE

## 2021-09-16 PROCEDURE — 99232 SBSQ HOSP IP/OBS MODERATE 35: CPT | Mod: ,,, | Performed by: INTERNAL MEDICINE

## 2021-09-16 PROCEDURE — 36415 COLL VENOUS BLD VENIPUNCTURE: CPT | Performed by: INTERNAL MEDICINE

## 2021-09-16 PROCEDURE — 25000003 PHARM REV CODE 250

## 2021-09-16 PROCEDURE — 11000001 HC ACUTE MED/SURG PRIVATE ROOM

## 2021-09-16 PROCEDURE — 63600175 PHARM REV CODE 636 W HCPCS: Performed by: FAMILY MEDICINE

## 2021-09-16 PROCEDURE — 99232 PR SUBSEQUENT HOSPITAL CARE,LEVL II: ICD-10-PCS | Mod: ,,, | Performed by: INTERNAL MEDICINE

## 2021-09-16 PROCEDURE — 83735 ASSAY OF MAGNESIUM: CPT

## 2021-09-16 PROCEDURE — 80048 BASIC METABOLIC PNL TOTAL CA: CPT | Performed by: INTERNAL MEDICINE

## 2021-09-16 PROCEDURE — 83930 ASSAY OF BLOOD OSMOLALITY: CPT | Mod: 90 | Performed by: FAMILY MEDICINE

## 2021-09-16 PROCEDURE — 80048 BASIC METABOLIC PNL TOTAL CA: CPT | Mod: XB | Performed by: INTERNAL MEDICINE

## 2021-09-16 PROCEDURE — 82962 GLUCOSE BLOOD TEST: CPT

## 2021-09-16 PROCEDURE — 85025 COMPLETE CBC W/AUTO DIFF WBC: CPT | Performed by: INTERNAL MEDICINE

## 2021-09-16 PROCEDURE — 25000003 PHARM REV CODE 250: Performed by: INTERNAL MEDICINE

## 2021-09-16 PROCEDURE — 63600175 PHARM REV CODE 636 W HCPCS: Performed by: HOSPITALIST

## 2021-09-16 RX ORDER — POTASSIUM CHLORIDE 20 MEQ/1
20 TABLET, EXTENDED RELEASE ORAL ONCE
Status: DISCONTINUED | OUTPATIENT
Start: 2021-09-16 | End: 2021-09-16 | Stop reason: SDUPTHER

## 2021-09-16 RX ORDER — POTASSIUM CHLORIDE 7.45 MG/ML
INJECTION INTRAVENOUS
Status: COMPLETED
Start: 2021-09-16 | End: 2021-09-16

## 2021-09-16 RX ORDER — MEMANTINE HYDROCHLORIDE 5 MG/1
5 TABLET ORAL 2 TIMES DAILY
Status: DISCONTINUED | OUTPATIENT
Start: 2021-09-16 | End: 2021-09-27 | Stop reason: HOSPADM

## 2021-09-16 RX ORDER — RISPERIDONE 0.5 MG/1
0.5 TABLET ORAL 2 TIMES DAILY
Status: DISCONTINUED | OUTPATIENT
Start: 2021-09-16 | End: 2021-09-27 | Stop reason: HOSPADM

## 2021-09-16 RX ORDER — POTASSIUM CHLORIDE 7.45 MG/ML
20 INJECTION INTRAVENOUS ONCE
Status: COMPLETED | OUTPATIENT
Start: 2021-09-16 | End: 2021-09-16

## 2021-09-16 RX ORDER — RISPERIDONE 0.5 MG/1
0.5 TABLET ORAL 2 TIMES DAILY
Status: CANCELLED | OUTPATIENT
Start: 2021-09-16

## 2021-09-16 RX ORDER — DONEPEZIL HYDROCHLORIDE 5 MG/1
10 TABLET, FILM COATED ORAL NIGHTLY
Status: DISCONTINUED | OUTPATIENT
Start: 2021-09-16 | End: 2021-09-27 | Stop reason: HOSPADM

## 2021-09-16 RX ORDER — POTASSIUM CHLORIDE 20 MEQ/1
40 TABLET, EXTENDED RELEASE ORAL ONCE
Status: COMPLETED | OUTPATIENT
Start: 2021-09-16 | End: 2021-09-16

## 2021-09-16 RX ADMIN — QUETIAPINE 300 MG: 200 TABLET ORAL at 08:09

## 2021-09-16 RX ADMIN — POTASSIUM CHLORIDE 40 MEQ: 1500 TABLET, EXTENDED RELEASE ORAL at 09:09

## 2021-09-16 RX ADMIN — DEXTROSE MONOHYDRATE: 50 INJECTION, SOLUTION INTRAVENOUS at 06:09

## 2021-09-16 RX ADMIN — DONEPEZIL HYDROCHLORIDE 10 MG: 5 TABLET ORAL at 08:09

## 2021-09-16 RX ADMIN — DEXTROSE MONOHYDRATE: 50 INJECTION, SOLUTION INTRAVENOUS at 03:09

## 2021-09-16 RX ADMIN — RISPERIDONE 0.5 MG: 0.5 TABLET ORAL at 08:09

## 2021-09-16 RX ADMIN — HEPARIN SODIUM 5000 UNITS: 5000 INJECTION INTRAVENOUS; SUBCUTANEOUS at 01:09

## 2021-09-16 RX ADMIN — HEPARIN SODIUM 5000 UNITS: 5000 INJECTION INTRAVENOUS; SUBCUTANEOUS at 09:09

## 2021-09-16 RX ADMIN — MEMANTINE 5 MG: 5 TABLET ORAL at 09:09

## 2021-09-16 RX ADMIN — POTASSIUM CHLORIDE 20 MEQ: 7.46 INJECTION, SOLUTION INTRAVENOUS at 04:09

## 2021-09-16 RX ADMIN — HEPARIN SODIUM 5000 UNITS: 5000 INJECTION INTRAVENOUS; SUBCUTANEOUS at 05:09

## 2021-09-17 PROBLEM — R13.10 DYSPHAGIA: Status: ACTIVE | Noted: 2021-09-17

## 2021-09-17 PROBLEM — J69.0 ASPIRATION PNEUMONIA: Status: ACTIVE | Noted: 2021-09-17

## 2021-09-17 LAB
ALBUMIN SERPL BCP-MCNC: 2.1 G/DL (ref 3.5–5)
ALBUMIN SERPL BCP-MCNC: 2.3 G/DL (ref 3.5–5)
ALBUMIN/GLOB SERPL: 0.5 {RATIO}
ALBUMIN/GLOB SERPL: 0.7 {RATIO}
ALP SERPL-CCNC: 87 U/L (ref 45–115)
ALP SERPL-CCNC: 88 U/L (ref 45–115)
ALT SERPL W P-5'-P-CCNC: 59 U/L (ref 16–61)
ALT SERPL W P-5'-P-CCNC: 65 U/L (ref 16–61)
ANION GAP SERPL CALCULATED.3IONS-SCNC: 12 MMOL/L (ref 7–16)
ANION GAP SERPL CALCULATED.3IONS-SCNC: 15 MMOL/L (ref 7–16)
ANISOCYTOSIS BLD QL SMEAR: ABNORMAL
AST SERPL W P-5'-P-CCNC: 57 U/L (ref 15–37)
AST SERPL W P-5'-P-CCNC: 59 U/L (ref 15–37)
BASOPHILS # BLD AUTO: 0.01 K/UL (ref 0–0.2)
BASOPHILS NFR BLD AUTO: 0.1 % (ref 0–1)
BILIRUB SERPL-MCNC: 1.4 MG/DL (ref 0–1.2)
BILIRUB SERPL-MCNC: 1.9 MG/DL (ref 0–1.2)
BUN SERPL-MCNC: 21 MG/DL (ref 7–18)
BUN SERPL-MCNC: 22 MG/DL (ref 7–18)
BUN/CREAT SERPL: 13 (ref 6–20)
BUN/CREAT SERPL: 14 (ref 6–20)
CALCIUM SERPL-MCNC: 8.2 MG/DL (ref 8.5–10.1)
CALCIUM SERPL-MCNC: 9 MG/DL (ref 8.5–10.1)
CHLORIDE SERPL-SCNC: 114 MMOL/L (ref 98–107)
CHLORIDE SERPL-SCNC: 114 MMOL/L (ref 98–107)
CO2 SERPL-SCNC: 23 MMOL/L (ref 21–32)
CO2 SERPL-SCNC: 26 MMOL/L (ref 21–32)
CREAT SERPL-MCNC: 1.54 MG/DL (ref 0.7–1.3)
CREAT SERPL-MCNC: 1.72 MG/DL (ref 0.7–1.3)
DIFFERENTIAL METHOD BLD: ABNORMAL
EOSINOPHIL # BLD AUTO: 0.03 K/UL (ref 0–0.5)
EOSINOPHIL NFR BLD AUTO: 0.3 % (ref 1–4)
ERYTHROCYTE [DISTWIDTH] IN BLOOD BY AUTOMATED COUNT: 15.2 % (ref 11.5–14.5)
GLOBULIN SER-MCNC: 3.2 G/DL (ref 2–4)
GLOBULIN SER-MCNC: 4 G/DL (ref 2–4)
GLUCOSE SERPL-MCNC: 113 MG/DL (ref 70–105)
GLUCOSE SERPL-MCNC: 114 MG/DL (ref 74–106)
GLUCOSE SERPL-MCNC: 117 MG/DL (ref 70–105)
GLUCOSE SERPL-MCNC: 117 MG/DL (ref 70–105)
GLUCOSE SERPL-MCNC: 127 MG/DL (ref 74–106)
GLUCOSE SERPL-MCNC: 131 MG/DL (ref 70–105)
GLUCOSE SERPL-MCNC: 86 MG/DL (ref 70–105)
HCT VFR BLD AUTO: 36.5 % (ref 40–54)
HGB BLD-MCNC: 12.5 G/DL (ref 13.5–18)
IMM GRANULOCYTES # BLD AUTO: 0.09 K/UL (ref 0–0.04)
IMM GRANULOCYTES NFR BLD: 1 % (ref 0–0.4)
LACTATE SERPL-SCNC: 1.6 MMOL/L (ref 0.4–2)
LACTATE SERPL-SCNC: 2.1 MMOL/L (ref 0.4–2)
LYMPHOCYTES # BLD AUTO: 0.54 K/UL (ref 1–4.8)
LYMPHOCYTES NFR BLD AUTO: 6 % (ref 27–41)
LYMPHOCYTES NFR BLD MANUAL: 8 % (ref 27–41)
MCH RBC QN AUTO: 25.8 PG (ref 27–31)
MCHC RBC AUTO-ENTMCNC: 34.2 G/DL (ref 32–36)
MCV RBC AUTO: 75.3 FL (ref 80–96)
MICROCYTES BLD QL SMEAR: ABNORMAL
MONOCYTES # BLD AUTO: 0.59 K/UL (ref 0–0.8)
MONOCYTES NFR BLD AUTO: 6.5 % (ref 2–6)
MONOCYTES NFR BLD MANUAL: 5 % (ref 2–6)
MPC BLD CALC-MCNC: 12.7 FL (ref 9.4–12.4)
NEUTROPHILS # BLD AUTO: 7.76 K/UL (ref 1.8–7.7)
NEUTROPHILS NFR BLD AUTO: 86.1 % (ref 53–65)
NEUTS BAND NFR BLD MANUAL: 26 % (ref 1–5)
NEUTS SEG NFR BLD MANUAL: 61 % (ref 50–62)
NRBC # BLD AUTO: 0.02 X10E3/UL
NRBC, AUTO (.00): 0.2 %
PLATELET # BLD AUTO: 71 K/UL (ref 150–400)
PLATELET MORPHOLOGY: ABNORMAL
POLYCHROMASIA BLD QL SMEAR: ABNORMAL
POTASSIUM SERPL-SCNC: 3.1 MMOL/L (ref 3.5–5.1)
POTASSIUM SERPL-SCNC: 3.5 MMOL/L (ref 3.5–5.1)
PROT SERPL-MCNC: 5.5 G/DL (ref 6.4–8.2)
PROT SERPL-MCNC: 6.1 G/DL (ref 6.4–8.2)
RBC # BLD AUTO: 4.85 M/UL (ref 4.6–6.2)
SARS-COV-2 RNA RESP QL NAA+PROBE: NEGATIVE
SODIUM SERPL-SCNC: 146 MMOL/L (ref 136–145)
SODIUM SERPL-SCNC: 151 MMOL/L (ref 136–145)
WBC # BLD AUTO: 9.02 K/UL (ref 4.5–11)

## 2021-09-17 PROCEDURE — 80053 COMPREHEN METABOLIC PANEL: CPT | Performed by: INTERNAL MEDICINE

## 2021-09-17 PROCEDURE — 36415 COLL VENOUS BLD VENIPUNCTURE: CPT | Performed by: FAMILY MEDICINE

## 2021-09-17 PROCEDURE — 25000003 PHARM REV CODE 250: Performed by: FAMILY MEDICINE

## 2021-09-17 PROCEDURE — 80053 COMPREHEN METABOLIC PANEL: CPT | Performed by: FAMILY MEDICINE

## 2021-09-17 PROCEDURE — 87040 BLOOD CULTURE FOR BACTERIA: CPT | Performed by: FAMILY MEDICINE

## 2021-09-17 PROCEDURE — 87635 SARS-COV-2 COVID-19 AMP PRB: CPT | Performed by: FAMILY MEDICINE

## 2021-09-17 PROCEDURE — 25000003 PHARM REV CODE 250: Performed by: HOSPITALIST

## 2021-09-17 PROCEDURE — 36415 COLL VENOUS BLD VENIPUNCTURE: CPT | Performed by: INTERNAL MEDICINE

## 2021-09-17 PROCEDURE — 99232 PR SUBSEQUENT HOSPITAL CARE,LEVL II: ICD-10-PCS | Mod: GC,,, | Performed by: FAMILY MEDICINE

## 2021-09-17 PROCEDURE — 93010 ELECTROCARDIOGRAM REPORT: CPT | Mod: ,,, | Performed by: HOSPITALIST

## 2021-09-17 PROCEDURE — 99232 SBSQ HOSP IP/OBS MODERATE 35: CPT | Mod: GC,,, | Performed by: FAMILY MEDICINE

## 2021-09-17 PROCEDURE — 82803 BLOOD GASES ANY COMBINATION: CPT

## 2021-09-17 PROCEDURE — 11000001 HC ACUTE MED/SURG PRIVATE ROOM

## 2021-09-17 PROCEDURE — 83605 ASSAY OF LACTIC ACID: CPT | Performed by: FAMILY MEDICINE

## 2021-09-17 PROCEDURE — 63600175 PHARM REV CODE 636 W HCPCS: Performed by: FAMILY MEDICINE

## 2021-09-17 PROCEDURE — 84145 PROCALCITONIN (PCT): CPT | Mod: 90 | Performed by: FAMILY MEDICINE

## 2021-09-17 PROCEDURE — 99900035 HC TECH TIME PER 15 MIN (STAT)

## 2021-09-17 PROCEDURE — 82962 GLUCOSE BLOOD TEST: CPT

## 2021-09-17 PROCEDURE — 93005 ELECTROCARDIOGRAM TRACING: CPT

## 2021-09-17 PROCEDURE — 93010 EKG 12-LEAD: ICD-10-PCS | Mod: ,,, | Performed by: HOSPITALIST

## 2021-09-17 PROCEDURE — 36600 WITHDRAWAL OF ARTERIAL BLOOD: CPT

## 2021-09-17 PROCEDURE — 94761 N-INVAS EAR/PLS OXIMETRY MLT: CPT

## 2021-09-17 PROCEDURE — 27000221 HC OXYGEN, UP TO 24 HOURS

## 2021-09-17 PROCEDURE — 25000003 PHARM REV CODE 250

## 2021-09-17 PROCEDURE — 63600175 PHARM REV CODE 636 W HCPCS: Performed by: HOSPITALIST

## 2021-09-17 PROCEDURE — 85025 COMPLETE CBC W/AUTO DIFF WBC: CPT | Performed by: INTERNAL MEDICINE

## 2021-09-17 RX ORDER — DEXTROSE MONOHYDRATE AND SODIUM CHLORIDE 5; .45 G/100ML; G/100ML
INJECTION, SOLUTION INTRAVENOUS CONTINUOUS
Status: DISCONTINUED | OUTPATIENT
Start: 2021-09-17 | End: 2021-09-17

## 2021-09-17 RX ORDER — DEXTROSE MONOHYDRATE 50 MG/ML
INJECTION, SOLUTION INTRAVENOUS CONTINUOUS
Status: DISCONTINUED | OUTPATIENT
Start: 2021-09-17 | End: 2021-09-17

## 2021-09-17 RX ORDER — POTASSIUM CHLORIDE 20 MEQ/1
40 TABLET, EXTENDED RELEASE ORAL ONCE
Status: COMPLETED | OUTPATIENT
Start: 2021-09-17 | End: 2021-09-17

## 2021-09-17 RX ORDER — POTASSIUM CHLORIDE 7.45 MG/ML
10 INJECTION INTRAVENOUS ONCE
Status: DISCONTINUED | OUTPATIENT
Start: 2021-09-17 | End: 2021-09-17

## 2021-09-17 RX ORDER — LINEZOLID 2 MG/ML
600 INJECTION, SOLUTION INTRAVENOUS
Status: DISCONTINUED | OUTPATIENT
Start: 2021-09-17 | End: 2021-09-21

## 2021-09-17 RX ORDER — SODIUM CHLORIDE 450 MG/100ML
INJECTION, SOLUTION INTRAVENOUS CONTINUOUS
Status: DISCONTINUED | OUTPATIENT
Start: 2021-09-17 | End: 2021-09-18

## 2021-09-17 RX ADMIN — POTASSIUM CHLORIDE 40 MEQ: 1500 TABLET, EXTENDED RELEASE ORAL at 05:09

## 2021-09-17 RX ADMIN — QUETIAPINE 300 MG: 200 TABLET ORAL at 08:09

## 2021-09-17 RX ADMIN — PIPERACILLIN AND TAZOBACTAM 4.5 G: 4; .5 INJECTION, POWDER, FOR SOLUTION INTRAVENOUS at 05:09

## 2021-09-17 RX ADMIN — DONEPEZIL HYDROCHLORIDE 10 MG: 5 TABLET ORAL at 08:09

## 2021-09-17 RX ADMIN — PIPERACILLIN AND TAZOBACTAM 4.5 G: 4; .5 INJECTION, POWDER, LYOPHILIZED, FOR SOLUTION INTRAVENOUS; PARENTERAL at 06:09

## 2021-09-17 RX ADMIN — HEPARIN SODIUM 5000 UNITS: 5000 INJECTION INTRAVENOUS; SUBCUTANEOUS at 01:09

## 2021-09-17 RX ADMIN — MEMANTINE 5 MG: 5 TABLET ORAL at 08:09

## 2021-09-17 RX ADMIN — BISACODYL 10 MG: 10 SUPPOSITORY RECTAL at 10:09

## 2021-09-17 RX ADMIN — RISPERIDONE 0.5 MG: 0.5 TABLET ORAL at 10:09

## 2021-09-17 RX ADMIN — DEXTROSE MONOHYDRATE: 50 INJECTION, SOLUTION INTRAVENOUS at 05:09

## 2021-09-17 RX ADMIN — HEPARIN SODIUM 5000 UNITS: 5000 INJECTION INTRAVENOUS; SUBCUTANEOUS at 10:09

## 2021-09-17 RX ADMIN — MEMANTINE 5 MG: 5 TABLET ORAL at 10:09

## 2021-09-17 RX ADMIN — HEPARIN SODIUM 5000 UNITS: 5000 INJECTION INTRAVENOUS; SUBCUTANEOUS at 05:09

## 2021-09-17 RX ADMIN — RISPERIDONE 0.5 MG: 0.5 TABLET ORAL at 08:09

## 2021-09-17 RX ADMIN — ACETAMINOPHEN 1000 MG: 500 TABLET ORAL at 09:09

## 2021-09-17 RX ADMIN — PIPERACILLIN AND TAZOBACTAM 4.5 G: 4; .5 INJECTION, POWDER, FOR SOLUTION INTRAVENOUS at 10:09

## 2021-09-17 RX ADMIN — QUETIAPINE 300 MG: 200 TABLET ORAL at 10:09

## 2021-09-17 RX ADMIN — LINEZOLID 600 MG: 600 INJECTION, SOLUTION INTRAVENOUS at 08:09

## 2021-09-18 PROBLEM — Z78.9: Status: ACTIVE | Noted: 2021-09-18

## 2021-09-18 LAB
ALBUMIN SERPL BCP-MCNC: 2 G/DL (ref 3.5–5)
ALBUMIN/GLOB SERPL: 0.5 {RATIO}
ALP SERPL-CCNC: 93 U/L (ref 45–115)
ALT SERPL W P-5'-P-CCNC: 62 U/L (ref 16–61)
ANION GAP SERPL CALCULATED.3IONS-SCNC: 11 MMOL/L (ref 7–16)
ANION GAP SERPL CALCULATED.3IONS-SCNC: 14 MMOL/L (ref 7–16)
ANISOCYTOSIS BLD QL SMEAR: ABNORMAL
AST SERPL W P-5'-P-CCNC: 56 U/L (ref 15–37)
BASOPHILS # BLD AUTO: 0.02 K/UL (ref 0–0.2)
BASOPHILS NFR BLD AUTO: 0.2 % (ref 0–1)
BILIRUB SERPL-MCNC: 1.6 MG/DL (ref 0–1.2)
BUN SERPL-MCNC: 20 MG/DL (ref 7–18)
BUN SERPL-MCNC: 24 MG/DL (ref 7–18)
BUN/CREAT SERPL: 12 (ref 6–20)
BUN/CREAT SERPL: 12 (ref 6–20)
CALCIUM SERPL-MCNC: 8.9 MG/DL (ref 8.5–10.1)
CALCIUM SERPL-MCNC: 9.1 MG/DL (ref 8.5–10.1)
CHLORIDE SERPL-SCNC: 113 MMOL/L (ref 98–107)
CHLORIDE SERPL-SCNC: 113 MMOL/L (ref 98–107)
CO2 SERPL-SCNC: 25 MMOL/L (ref 21–32)
CO2 SERPL-SCNC: 29 MMOL/L (ref 21–32)
CREAT SERPL-MCNC: 1.61 MG/DL (ref 0.7–1.3)
CREAT SERPL-MCNC: 1.96 MG/DL (ref 0.7–1.3)
CRENATED CELLS: ABNORMAL
DIFFERENTIAL METHOD BLD: ABNORMAL
EOSINOPHIL # BLD AUTO: 0.08 K/UL (ref 0–0.5)
EOSINOPHIL NFR BLD AUTO: 0.7 % (ref 1–4)
ERYTHROCYTE [DISTWIDTH] IN BLOOD BY AUTOMATED COUNT: 15.4 % (ref 11.5–14.5)
GLOBULIN SER-MCNC: 4.2 G/DL (ref 2–4)
GLUCOSE SERPL-MCNC: 104 MG/DL (ref 70–105)
GLUCOSE SERPL-MCNC: 104 MG/DL (ref 74–106)
GLUCOSE SERPL-MCNC: 108 MG/DL (ref 70–105)
GLUCOSE SERPL-MCNC: 129 MG/DL (ref 70–105)
GLUCOSE SERPL-MCNC: 83 MG/DL (ref 70–105)
GLUCOSE SERPL-MCNC: 95 MG/DL (ref 70–105)
GLUCOSE SERPL-MCNC: 99 MG/DL (ref 74–106)
HCT VFR BLD AUTO: 33.3 % (ref 40–54)
HGB BLD-MCNC: 11.3 G/DL (ref 13.5–18)
HYPOCHROMIA BLD QL SMEAR: ABNORMAL
IMM GRANULOCYTES # BLD AUTO: 0.09 K/UL (ref 0–0.04)
IMM GRANULOCYTES NFR BLD: 0.8 % (ref 0–0.4)
LYMPHOCYTES # BLD AUTO: 0.68 K/UL (ref 1–4.8)
LYMPHOCYTES NFR BLD AUTO: 6 % (ref 27–41)
LYMPHOCYTES NFR BLD MANUAL: 7 % (ref 27–41)
MAGNESIUM SERPL-MCNC: 2.4 MG/DL (ref 1.7–2.3)
MAYO GENERIC ORDERABLE RESULT: ABNORMAL
MCH RBC QN AUTO: 25.9 PG (ref 27–31)
MCHC RBC AUTO-ENTMCNC: 33.9 G/DL (ref 32–36)
MCV RBC AUTO: 76.2 FL (ref 80–96)
MICROCYTES BLD QL SMEAR: ABNORMAL
MONOCYTES # BLD AUTO: 0.72 K/UL (ref 0–0.8)
MONOCYTES NFR BLD AUTO: 6.3 % (ref 2–6)
MONOCYTES NFR BLD MANUAL: 10 % (ref 2–6)
MPC BLD CALC-MCNC: ABNORMAL G/DL
NEUTROPHILS # BLD AUTO: 9.78 K/UL (ref 1.8–7.7)
NEUTROPHILS NFR BLD AUTO: 86 % (ref 53–65)
NEUTS BAND NFR BLD MANUAL: 18 % (ref 1–5)
NEUTS SEG NFR BLD MANUAL: 65 % (ref 50–62)
NRBC # BLD AUTO: 0 X10E3/UL
NRBC, AUTO (.00): 0 %
OVALOCYTES BLD QL SMEAR: ABNORMAL
PHOSPHATE SERPL-MCNC: 2.5 MG/DL (ref 2.5–4.5)
PLATELET # BLD AUTO: 83 K/UL (ref 150–400)
PLATELET MORPHOLOGY: ABNORMAL
POLYCHROMASIA BLD QL SMEAR: ABNORMAL
POTASSIUM SERPL-SCNC: 3.2 MMOL/L (ref 3.5–5.1)
POTASSIUM SERPL-SCNC: 3.4 MMOL/L (ref 3.5–5.1)
PROT SERPL-MCNC: 6.2 G/DL (ref 6.4–8.2)
RBC # BLD AUTO: 4.37 M/UL (ref 4.6–6.2)
SODIUM SERPL-SCNC: 149 MMOL/L (ref 136–145)
SODIUM SERPL-SCNC: 150 MMOL/L (ref 136–145)
WBC # BLD AUTO: 11.37 K/UL (ref 4.5–11)

## 2021-09-18 PROCEDURE — 36415 COLL VENOUS BLD VENIPUNCTURE: CPT | Performed by: FAMILY MEDICINE

## 2021-09-18 PROCEDURE — 27000221 HC OXYGEN, UP TO 24 HOURS

## 2021-09-18 PROCEDURE — 99232 SBSQ HOSP IP/OBS MODERATE 35: CPT | Mod: GC,,, | Performed by: FAMILY MEDICINE

## 2021-09-18 PROCEDURE — 36415 COLL VENOUS BLD VENIPUNCTURE: CPT | Performed by: INTERNAL MEDICINE

## 2021-09-18 PROCEDURE — 83735 ASSAY OF MAGNESIUM: CPT | Performed by: FAMILY MEDICINE

## 2021-09-18 PROCEDURE — 99232 PR SUBSEQUENT HOSPITAL CARE,LEVL II: ICD-10-PCS | Mod: GC,,, | Performed by: FAMILY MEDICINE

## 2021-09-18 PROCEDURE — 85025 COMPLETE CBC W/AUTO DIFF WBC: CPT | Performed by: INTERNAL MEDICINE

## 2021-09-18 PROCEDURE — 63600175 PHARM REV CODE 636 W HCPCS: Performed by: FAMILY MEDICINE

## 2021-09-18 PROCEDURE — 84100 ASSAY OF PHOSPHORUS: CPT | Performed by: FAMILY MEDICINE

## 2021-09-18 PROCEDURE — 25000003 PHARM REV CODE 250: Performed by: HOSPITALIST

## 2021-09-18 PROCEDURE — 25000003 PHARM REV CODE 250: Performed by: FAMILY MEDICINE

## 2021-09-18 PROCEDURE — 25000003 PHARM REV CODE 250

## 2021-09-18 PROCEDURE — 63600175 PHARM REV CODE 636 W HCPCS: Performed by: HOSPITALIST

## 2021-09-18 PROCEDURE — 82962 GLUCOSE BLOOD TEST: CPT

## 2021-09-18 PROCEDURE — 80048 BASIC METABOLIC PNL TOTAL CA: CPT | Mod: XB | Performed by: FAMILY MEDICINE

## 2021-09-18 PROCEDURE — 11000001 HC ACUTE MED/SURG PRIVATE ROOM

## 2021-09-18 PROCEDURE — 80053 COMPREHEN METABOLIC PANEL: CPT | Performed by: INTERNAL MEDICINE

## 2021-09-18 RX ORDER — SODIUM CHLORIDE 450 MG/100ML
INJECTION, SOLUTION INTRAVENOUS CONTINUOUS
Status: DISCONTINUED | OUTPATIENT
Start: 2021-09-18 | End: 2021-09-19

## 2021-09-18 RX ORDER — SODIUM CHLORIDE 450 MG/100ML
INJECTION, SOLUTION INTRAVENOUS CONTINUOUS
Status: DISCONTINUED | OUTPATIENT
Start: 2021-09-19 | End: 2021-09-25

## 2021-09-18 RX ORDER — GLUCAGON 1 MG
1 KIT INJECTION
Status: DISCONTINUED | OUTPATIENT
Start: 2021-09-18 | End: 2021-09-24

## 2021-09-18 RX ORDER — INSULIN ASPART 100 [IU]/ML
0-5 INJECTION, SOLUTION INTRAVENOUS; SUBCUTANEOUS EVERY 4 HOURS PRN
Status: DISCONTINUED | OUTPATIENT
Start: 2021-09-18 | End: 2021-09-24

## 2021-09-18 RX ORDER — POTASSIUM CHLORIDE 750 MG/1
10 TABLET, EXTENDED RELEASE ORAL ONCE
Status: COMPLETED | OUTPATIENT
Start: 2021-09-18 | End: 2021-09-18

## 2021-09-18 RX ORDER — DEXTROSE MONOHYDRATE 100 MG/ML
INJECTION, SOLUTION INTRAVENOUS CONTINUOUS PRN
Status: DISCONTINUED | OUTPATIENT
Start: 2021-09-19 | End: 2021-09-24

## 2021-09-18 RX ADMIN — SODIUM CHLORIDE: 4.5 INJECTION, SOLUTION INTRAVENOUS at 05:09

## 2021-09-18 RX ADMIN — MEMANTINE 5 MG: 5 TABLET ORAL at 09:09

## 2021-09-18 RX ADMIN — POTASSIUM CHLORIDE 10 MEQ: 750 TABLET, FILM COATED, EXTENDED RELEASE ORAL at 09:09

## 2021-09-18 RX ADMIN — DONEPEZIL HYDROCHLORIDE 10 MG: 5 TABLET ORAL at 08:09

## 2021-09-18 RX ADMIN — MEMANTINE 5 MG: 5 TABLET ORAL at 08:09

## 2021-09-18 RX ADMIN — LINEZOLID 600 MG: 600 INJECTION, SOLUTION INTRAVENOUS at 07:09

## 2021-09-18 RX ADMIN — LINEZOLID 600 MG: 600 INJECTION, SOLUTION INTRAVENOUS at 08:09

## 2021-09-18 RX ADMIN — PIPERACILLIN AND TAZOBACTAM 4.5 G: 4; .5 INJECTION, POWDER, FOR SOLUTION INTRAVENOUS at 01:09

## 2021-09-18 RX ADMIN — HEPARIN SODIUM 5000 UNITS: 5000 INJECTION INTRAVENOUS; SUBCUTANEOUS at 10:09

## 2021-09-18 RX ADMIN — SODIUM CHLORIDE: 4.5 INJECTION, SOLUTION INTRAVENOUS at 10:09

## 2021-09-18 RX ADMIN — RISPERIDONE 0.5 MG: 0.5 TABLET ORAL at 08:09

## 2021-09-18 RX ADMIN — HEPARIN SODIUM 5000 UNITS: 5000 INJECTION INTRAVENOUS; SUBCUTANEOUS at 05:09

## 2021-09-18 RX ADMIN — BISACODYL 10 MG: 10 SUPPOSITORY RECTAL at 09:09

## 2021-09-18 RX ADMIN — RISPERIDONE 0.5 MG: 0.5 TABLET ORAL at 09:09

## 2021-09-18 RX ADMIN — QUETIAPINE 300 MG: 200 TABLET ORAL at 08:09

## 2021-09-18 RX ADMIN — POTASSIUM PHOSPHATE, MONOBASIC AND POTASSIUM PHOSPHATE, DIBASIC 15 MMOL: 224; 236 INJECTION, SOLUTION, CONCENTRATE INTRAVENOUS at 08:09

## 2021-09-18 RX ADMIN — HEPARIN SODIUM 5000 UNITS: 5000 INJECTION INTRAVENOUS; SUBCUTANEOUS at 02:09

## 2021-09-18 RX ADMIN — PIPERACILLIN AND TAZOBACTAM 4.5 G: 4; .5 INJECTION, POWDER, FOR SOLUTION INTRAVENOUS at 05:09

## 2021-09-18 RX ADMIN — QUETIAPINE 300 MG: 200 TABLET ORAL at 09:09

## 2021-09-18 RX ADMIN — PIPERACILLIN AND TAZOBACTAM 4.5 G: 4; .5 INJECTION, POWDER, FOR SOLUTION INTRAVENOUS at 09:09

## 2021-09-19 PROBLEM — K59.00 CONSTIPATION: Status: RESOLVED | Noted: 2021-09-15 | Resolved: 2021-09-19

## 2021-09-19 LAB
ALBUMIN SERPL BCP-MCNC: 1.5 G/DL (ref 3.5–5)
ALBUMIN SERPL BCP-MCNC: 1.7 G/DL (ref 3.5–5)
ALBUMIN/GLOB SERPL: 0.5 {RATIO}
ALP SERPL-CCNC: 101 U/L (ref 45–115)
ALP SERPL-CCNC: 93 U/L (ref 45–115)
ALT SERPL W P-5'-P-CCNC: 52 U/L (ref 16–61)
ANION GAP SERPL CALCULATED.3IONS-SCNC: 12 MMOL/L (ref 7–16)
ANION GAP SERPL CALCULATED.3IONS-SCNC: 13 MMOL/L (ref 7–16)
ANION GAP SERPL CALCULATED.3IONS-SCNC: 7 MMOL/L (ref 7–16)
ANISOCYTOSIS BLD QL SMEAR: ABNORMAL
AST SERPL W P-5'-P-CCNC: 43 U/L (ref 15–37)
BASOPHILS # BLD AUTO: 0.02 K/UL (ref 0–0.2)
BASOPHILS NFR BLD AUTO: 0.2 % (ref 0–1)
BILIRUB SERPL-MCNC: 0.8 MG/DL (ref 0–1.2)
BILIRUB SERPL-MCNC: 1.1 MG/DL (ref 0–1.2)
BUN SERPL-MCNC: 18 MG/DL (ref 7–18)
BUN SERPL-MCNC: 20 MG/DL (ref 7–18)
BUN SERPL-MCNC: 21 MG/DL (ref 7–18)
BUN/CREAT SERPL: 11 (ref 6–20)
BUN/CREAT SERPL: 13 (ref 6–20)
CALCIUM SERPL-MCNC: 8.3 MG/DL (ref 8.5–10.1)
CALCIUM SERPL-MCNC: 8.8 MG/DL (ref 8.5–10.1)
CALCIUM SERPL-MCNC: 9.1 MG/DL (ref 8.5–10.1)
CHLORIDE SERPL-SCNC: 112 MMOL/L (ref 98–107)
CHLORIDE SERPL-SCNC: 113 MMOL/L (ref 98–107)
CHLORIDE SERPL-SCNC: 116 MMOL/L (ref 98–107)
CO2 SERPL-SCNC: 27 MMOL/L (ref 21–32)
CO2 SERPL-SCNC: 27 MMOL/L (ref 21–32)
CO2 SERPL-SCNC: 29 MMOL/L (ref 21–32)
CREAT SERPL-MCNC: 1.46 MG/DL (ref 0.7–1.3)
CREAT SERPL-MCNC: 1.5 MG/DL (ref 0.7–1.3)
CREAT SERPL-MCNC: 1.63 MG/DL (ref 0.7–1.3)
DIFFERENTIAL METHOD BLD: ABNORMAL
EOSINOPHIL # BLD AUTO: 0.08 K/UL (ref 0–0.5)
EOSINOPHIL NFR BLD AUTO: 0.7 % (ref 1–4)
EOSINOPHIL NFR BLD MANUAL: 1 % (ref 1–4)
ERYTHROCYTE [DISTWIDTH] IN BLOOD BY AUTOMATED COUNT: 15.5 % (ref 11.5–14.5)
GLOBULIN SER-MCNC: 3.1 G/DL (ref 2–4)
GLUCOSE SERPL-MCNC: 101 MG/DL (ref 74–106)
GLUCOSE SERPL-MCNC: 109 MG/DL (ref 70–105)
GLUCOSE SERPL-MCNC: 110 MG/DL (ref 70–105)
GLUCOSE SERPL-MCNC: 122 MG/DL (ref 74–106)
GLUCOSE SERPL-MCNC: 127 MG/DL (ref 70–105)
GLUCOSE SERPL-MCNC: 129 MG/DL (ref 74–106)
GLUCOSE SERPL-MCNC: 166 MG/DL (ref 70–105)
GLUCOSE SERPL-MCNC: 89 MG/DL (ref 70–105)
HCT VFR BLD AUTO: 28.1 % (ref 40–54)
HGB BLD-MCNC: 9.2 G/DL (ref 13.5–18)
HYPOCHROMIA BLD QL SMEAR: ABNORMAL
IMM GRANULOCYTES # BLD AUTO: 0.2 K/UL (ref 0–0.04)
IMM GRANULOCYTES NFR BLD: 1.7 % (ref 0–0.4)
LYMPHOCYTES # BLD AUTO: 0.63 K/UL (ref 1–4.8)
LYMPHOCYTES NFR BLD AUTO: 5.4 % (ref 27–41)
LYMPHOCYTES NFR BLD MANUAL: 8 % (ref 27–41)
MAGNESIUM SERPL-MCNC: 2.6 MG/DL (ref 1.7–2.3)
MCH RBC QN AUTO: 25.1 PG (ref 27–31)
MCHC RBC AUTO-ENTMCNC: 32.7 G/DL (ref 32–36)
MCV RBC AUTO: 76.8 FL (ref 80–96)
MICROCYTES BLD QL SMEAR: ABNORMAL
MONOCYTES # BLD AUTO: 0.44 K/UL (ref 0–0.8)
MONOCYTES NFR BLD AUTO: 3.8 % (ref 2–6)
MONOCYTES NFR BLD MANUAL: 4 % (ref 2–6)
MPC BLD CALC-MCNC: ABNORMAL G/DL
NEUTROPHILS # BLD AUTO: 10.36 K/UL (ref 1.8–7.7)
NEUTROPHILS NFR BLD AUTO: 88.2 % (ref 53–65)
NEUTS BAND NFR BLD MANUAL: 9 % (ref 1–5)
NEUTS SEG NFR BLD MANUAL: 78 % (ref 50–62)
NRBC # BLD AUTO: 0 X10E3/UL
NRBC, AUTO (.00): 0 %
OVALOCYTES BLD QL SMEAR: ABNORMAL
PHOSPHATE SERPL-MCNC: 2.6 MG/DL (ref 2.5–4.5)
PLATELET # BLD AUTO: 100 K/UL (ref 150–400)
PLATELET MORPHOLOGY: ABNORMAL
POLYCHROMASIA BLD QL SMEAR: ABNORMAL
POTASSIUM SERPL-SCNC: 3 MMOL/L (ref 3.5–5.1)
POTASSIUM SERPL-SCNC: 3.4 MMOL/L (ref 3.5–5.1)
POTASSIUM SERPL-SCNC: 3.4 MMOL/L (ref 3.5–5.1)
PREALB SERPL NEPH-MCNC: 4 MG/DL (ref 20–40)
PROT SERPL-MCNC: 4.8 G/DL (ref 6.4–8.2)
RBC # BLD AUTO: 3.66 M/UL (ref 4.6–6.2)
SODIUM SERPL-SCNC: 147 MMOL/L (ref 136–145)
SODIUM SERPL-SCNC: 150 MMOL/L (ref 136–145)
SODIUM SERPL-SCNC: 150 MMOL/L (ref 136–145)
TRIGL SERPL-MCNC: 199 MG/DL (ref 35–150)
WBC # BLD AUTO: 11.73 K/UL (ref 4.5–11)

## 2021-09-19 PROCEDURE — 94761 N-INVAS EAR/PLS OXIMETRY MLT: CPT

## 2021-09-19 PROCEDURE — 82247 BILIRUBIN TOTAL: CPT | Performed by: FAMILY MEDICINE

## 2021-09-19 PROCEDURE — 80053 COMPREHEN METABOLIC PANEL: CPT | Performed by: INTERNAL MEDICINE

## 2021-09-19 PROCEDURE — 63600175 PHARM REV CODE 636 W HCPCS: Performed by: FAMILY MEDICINE

## 2021-09-19 PROCEDURE — 84075 ASSAY ALKALINE PHOSPHATASE: CPT | Performed by: FAMILY MEDICINE

## 2021-09-19 PROCEDURE — 25000003 PHARM REV CODE 250

## 2021-09-19 PROCEDURE — 36415 COLL VENOUS BLD VENIPUNCTURE: CPT | Performed by: FAMILY MEDICINE

## 2021-09-19 PROCEDURE — 99232 SBSQ HOSP IP/OBS MODERATE 35: CPT | Mod: GC,,, | Performed by: FAMILY MEDICINE

## 2021-09-19 PROCEDURE — 85025 COMPLETE CBC W/AUTO DIFF WBC: CPT | Performed by: INTERNAL MEDICINE

## 2021-09-19 PROCEDURE — 25000003 PHARM REV CODE 250: Performed by: FAMILY MEDICINE

## 2021-09-19 PROCEDURE — 99232 PR SUBSEQUENT HOSPITAL CARE,LEVL II: ICD-10-PCS | Mod: GC,,, | Performed by: FAMILY MEDICINE

## 2021-09-19 PROCEDURE — 80069 RENAL FUNCTION PANEL: CPT | Performed by: FAMILY MEDICINE

## 2021-09-19 PROCEDURE — 82962 GLUCOSE BLOOD TEST: CPT

## 2021-09-19 PROCEDURE — 25000003 PHARM REV CODE 250: Performed by: HOSPITALIST

## 2021-09-19 PROCEDURE — 11000001 HC ACUTE MED/SURG PRIVATE ROOM

## 2021-09-19 PROCEDURE — 27000221 HC OXYGEN, UP TO 24 HOURS

## 2021-09-19 PROCEDURE — 63600175 PHARM REV CODE 636 W HCPCS: Performed by: HOSPITALIST

## 2021-09-19 PROCEDURE — B4185 PARENTERAL SOL 10 GM LIPIDS: HCPCS

## 2021-09-19 PROCEDURE — 80048 BASIC METABOLIC PNL TOTAL CA: CPT | Mod: XB | Performed by: FAMILY MEDICINE

## 2021-09-19 RX ADMIN — PIPERACILLIN AND TAZOBACTAM 4.5 G: 4; .5 INJECTION, POWDER, FOR SOLUTION INTRAVENOUS at 11:09

## 2021-09-19 RX ADMIN — QUETIAPINE 300 MG: 200 TABLET ORAL at 09:09

## 2021-09-19 RX ADMIN — I.V. FAT EMULSION 100 ML: 20 EMULSION INTRAVENOUS at 09:09

## 2021-09-19 RX ADMIN — HEPARIN SODIUM 5000 UNITS: 5000 INJECTION INTRAVENOUS; SUBCUTANEOUS at 02:09

## 2021-09-19 RX ADMIN — POTASSIUM BICARBONATE 40 MEQ: 782 TABLET, EFFERVESCENT ORAL at 02:09

## 2021-09-19 RX ADMIN — BISACODYL 10 MG: 10 SUPPOSITORY RECTAL at 09:09

## 2021-09-19 RX ADMIN — MEMANTINE 5 MG: 5 TABLET ORAL at 09:09

## 2021-09-19 RX ADMIN — LINEZOLID 600 MG: 600 INJECTION, SOLUTION INTRAVENOUS at 09:09

## 2021-09-19 RX ADMIN — HEPARIN SODIUM 5000 UNITS: 5000 INJECTION INTRAVENOUS; SUBCUTANEOUS at 10:09

## 2021-09-19 RX ADMIN — ASCORBIC ACID, VITAMIN A PALMITATE, CHOLECALCIFEROL, THIAMINE HYDROCHLORIDE, RIBOFLAVIN-5 PHOSPHATE SODIUM, PYRIDOXINE HYDROCHLORIDE, NIACINAMIDE, DEXPANTHENOL, ALPHA-TOCOPHEROL ACETATE, VITAMIN K1, FOLIC ACID, BIOTIN, CYANOCOBALAMIN: 200; 3300; 200; 6; 3.6; 6; 40; 15; 10; 150; 600; 60; 5 INJECTION, SOLUTION INTRAVENOUS at 10:09

## 2021-09-19 RX ADMIN — DONEPEZIL HYDROCHLORIDE 10 MG: 5 TABLET ORAL at 09:09

## 2021-09-19 RX ADMIN — RISPERIDONE 0.5 MG: 0.5 TABLET ORAL at 09:09

## 2021-09-19 RX ADMIN — HEPARIN SODIUM 5000 UNITS: 5000 INJECTION INTRAVENOUS; SUBCUTANEOUS at 05:09

## 2021-09-19 RX ADMIN — PIPERACILLIN AND TAZOBACTAM 4.5 G: 4; .5 INJECTION, POWDER, FOR SOLUTION INTRAVENOUS at 05:09

## 2021-09-19 RX ADMIN — PIPERACILLIN AND TAZOBACTAM 4.5 G: 4; .5 INJECTION, POWDER, FOR SOLUTION INTRAVENOUS at 02:09

## 2021-09-19 RX ADMIN — ASCORBIC ACID, VITAMIN A PALMITATE, CHOLECALCIFEROL, THIAMINE HYDROCHLORIDE, RIBOFLAVIN-5 PHOSPHATE SODIUM, PYRIDOXINE HYDROCHLORIDE, NIACINAMIDE, DEXPANTHENOL, ALPHA-TOCOPHEROL ACETATE, VITAMIN K1, FOLIC ACID, BIOTIN, CYANOCOBALAMIN: 200; 3300; 200; 6; 3.6; 6; 40; 15; 10; 150; 600; 60; 5 INJECTION, SOLUTION INTRAVENOUS at 02:09

## 2021-09-20 ENCOUNTER — ANESTHESIA (OUTPATIENT)
Dept: SURGERY | Facility: HOSPITAL | Age: 61
DRG: 640 | End: 2021-09-20
Payer: MEDICARE

## 2021-09-20 ENCOUNTER — ANESTHESIA EVENT (OUTPATIENT)
Dept: SURGERY | Facility: HOSPITAL | Age: 61
DRG: 640 | End: 2021-09-20
Payer: MEDICARE

## 2021-09-20 LAB
ALBUMIN SERPL BCP-MCNC: 1.5 G/DL (ref 3.5–5)
ALBUMIN SERPL BCP-MCNC: 1.6 G/DL (ref 3.5–5)
ALBUMIN/GLOB SERPL: 0.5 {RATIO}
ALP SERPL-CCNC: 109 U/L (ref 45–115)
ALP SERPL-CCNC: 119 U/L (ref 45–115)
ALT SERPL W P-5'-P-CCNC: 49 U/L (ref 16–61)
ANION GAP SERPL CALCULATED.3IONS-SCNC: 11 MMOL/L (ref 7–16)
ANION GAP SERPL CALCULATED.3IONS-SCNC: 9 MMOL/L (ref 7–16)
ANISOCYTOSIS BLD QL SMEAR: ABNORMAL
AST SERPL W P-5'-P-CCNC: 42 U/L (ref 15–37)
BACTERIA BLD CULT: NORMAL
BACTERIA BLD CULT: NORMAL
BASOPHILS # BLD AUTO: 0.01 K/UL (ref 0–0.2)
BASOPHILS NFR BLD AUTO: 0.1 % (ref 0–1)
BILIRUB SERPL-MCNC: 0.7 MG/DL (ref 0–1.2)
BILIRUB SERPL-MCNC: 0.7 MG/DL (ref 0–1.2)
BUN SERPL-MCNC: 20 MG/DL (ref 7–18)
BUN SERPL-MCNC: 21 MG/DL (ref 7–18)
BUN/CREAT SERPL: 16 (ref 6–20)
CALCIUM SERPL-MCNC: 8.6 MG/DL (ref 8.5–10.1)
CALCIUM SERPL-MCNC: 8.8 MG/DL (ref 8.5–10.1)
CHLORIDE SERPL-SCNC: 114 MMOL/L (ref 98–107)
CHLORIDE SERPL-SCNC: 117 MMOL/L (ref 98–107)
CO2 SERPL-SCNC: 26 MMOL/L (ref 21–32)
CO2 SERPL-SCNC: 28 MMOL/L (ref 21–32)
CREAT SERPL-MCNC: 1.19 MG/DL (ref 0.7–1.3)
CREAT SERPL-MCNC: 1.24 MG/DL (ref 0.7–1.3)
DIFFERENTIAL METHOD BLD: ABNORMAL
EOSINOPHIL # BLD AUTO: 0.13 K/UL (ref 0–0.5)
EOSINOPHIL NFR BLD AUTO: 1.1 % (ref 1–4)
EOSINOPHIL NFR BLD MANUAL: 2 % (ref 1–4)
ERYTHROCYTE [DISTWIDTH] IN BLOOD BY AUTOMATED COUNT: 15.2 % (ref 11.5–14.5)
GLOBULIN SER-MCNC: 3.4 G/DL (ref 2–4)
GLUCOSE SERPL-MCNC: 114 MG/DL (ref 70–105)
GLUCOSE SERPL-MCNC: 120 MG/DL (ref 74–106)
GLUCOSE SERPL-MCNC: 121 MG/DL (ref 74–106)
GLUCOSE SERPL-MCNC: 131 MG/DL (ref 70–105)
GLUCOSE SERPL-MCNC: 81 MG/DL (ref 70–105)
GLUCOSE SERPL-MCNC: 88 MG/DL (ref 70–105)
GLUCOSE SERPL-MCNC: 99 MG/DL (ref 70–105)
HCT VFR BLD AUTO: 26.2 % (ref 40–54)
HGB BLD-MCNC: 9.3 G/DL (ref 13.5–18)
HYPOCHROMIA BLD QL SMEAR: ABNORMAL
IMM GRANULOCYTES # BLD AUTO: 0.16 K/UL (ref 0–0.04)
IMM GRANULOCYTES NFR BLD: 1.4 % (ref 0–0.4)
LYMPHOCYTES # BLD AUTO: 0.71 K/UL (ref 1–4.8)
LYMPHOCYTES NFR BLD AUTO: 6.3 % (ref 27–41)
LYMPHOCYTES NFR BLD MANUAL: 14 % (ref 27–41)
MAGNESIUM SERPL-MCNC: 2.3 MG/DL (ref 1.7–2.3)
MCH RBC QN AUTO: 26.1 PG (ref 27–31)
MCHC RBC AUTO-ENTMCNC: 35.5 G/DL (ref 32–36)
MCV RBC AUTO: 73.4 FL (ref 80–96)
MICROCYTES BLD QL SMEAR: ABNORMAL
MONOCYTES # BLD AUTO: 0.46 K/UL (ref 0–0.8)
MONOCYTES NFR BLD AUTO: 4.1 % (ref 2–6)
MONOCYTES NFR BLD MANUAL: 1 % (ref 2–6)
MPC BLD CALC-MCNC: 11.4 FL (ref 9.4–12.4)
NEUTROPHILS # BLD AUTO: 9.87 K/UL (ref 1.8–7.7)
NEUTROPHILS NFR BLD AUTO: 87 % (ref 53–65)
NEUTS SEG NFR BLD MANUAL: 83 % (ref 50–62)
NRBC # BLD AUTO: 0.03 X10E3/UL
NRBC, AUTO (.00): 0.3 %
OVALOCYTES BLD QL SMEAR: ABNORMAL
PHOSPHATE SERPL-MCNC: 2.6 MG/DL (ref 2.5–4.5)
PLATELET # BLD AUTO: 142 K/UL (ref 150–400)
PLATELET MORPHOLOGY: ABNORMAL
POLYCHROMASIA BLD QL SMEAR: ABNORMAL
POTASSIUM SERPL-SCNC: 3 MMOL/L (ref 3.5–5.1)
POTASSIUM SERPL-SCNC: 3 MMOL/L (ref 3.5–5.1)
PREALB SERPL NEPH-MCNC: 4 MG/DL (ref 20–40)
PROT SERPL-MCNC: 5 G/DL (ref 6.4–8.2)
RBC # BLD AUTO: 3.57 M/UL (ref 4.6–6.2)
SODIUM SERPL-SCNC: 149 MMOL/L (ref 136–145)
SODIUM SERPL-SCNC: 150 MMOL/L (ref 136–145)
TRIGL SERPL-MCNC: 267 MG/DL (ref 35–150)
WBC # BLD AUTO: 11.34 K/UL (ref 4.5–11)

## 2021-09-20 PROCEDURE — 25000003 PHARM REV CODE 250: Performed by: FAMILY MEDICINE

## 2021-09-20 PROCEDURE — 37000009 HC ANESTHESIA EA ADD 15 MINS: Performed by: STUDENT IN AN ORGANIZED HEALTH CARE EDUCATION/TRAINING PROGRAM

## 2021-09-20 PROCEDURE — 37000008 HC ANESTHESIA 1ST 15 MINUTES: Performed by: STUDENT IN AN ORGANIZED HEALTH CARE EDUCATION/TRAINING PROGRAM

## 2021-09-20 PROCEDURE — 71000033 HC RECOVERY, INTIAL HOUR: Performed by: STUDENT IN AN ORGANIZED HEALTH CARE EDUCATION/TRAINING PROGRAM

## 2021-09-20 PROCEDURE — 43246 EGD PLACE GASTROSTOMY TUBE: CPT | Mod: ,,, | Performed by: STUDENT IN AN ORGANIZED HEALTH CARE EDUCATION/TRAINING PROGRAM

## 2021-09-20 PROCEDURE — D9220A PRA ANESTHESIA: ICD-10-PCS | Mod: ,,, | Performed by: ANESTHESIOLOGY

## 2021-09-20 PROCEDURE — 25000003 PHARM REV CODE 250: Performed by: INTERNAL MEDICINE

## 2021-09-20 PROCEDURE — 84075 ASSAY ALKALINE PHOSPHATASE: CPT

## 2021-09-20 PROCEDURE — 94761 N-INVAS EAR/PLS OXIMETRY MLT: CPT

## 2021-09-20 PROCEDURE — 63600175 PHARM REV CODE 636 W HCPCS: Performed by: HOSPITALIST

## 2021-09-20 PROCEDURE — 63600175 PHARM REV CODE 636 W HCPCS: Performed by: FAMILY MEDICINE

## 2021-09-20 PROCEDURE — D9220A PRA ANESTHESIA: Mod: ,,, | Performed by: ANESTHESIOLOGY

## 2021-09-20 PROCEDURE — 63600175 PHARM REV CODE 636 W HCPCS

## 2021-09-20 PROCEDURE — 36000706: Performed by: STUDENT IN AN ORGANIZED HEALTH CARE EDUCATION/TRAINING PROGRAM

## 2021-09-20 PROCEDURE — 25000003 PHARM REV CODE 250

## 2021-09-20 PROCEDURE — 11000001 HC ACUTE MED/SURG PRIVATE ROOM

## 2021-09-20 PROCEDURE — 63600175 PHARM REV CODE 636 W HCPCS: Performed by: NURSE ANESTHETIST, CERTIFIED REGISTERED

## 2021-09-20 PROCEDURE — 25000003 PHARM REV CODE 250: Performed by: NURSE ANESTHETIST, CERTIFIED REGISTERED

## 2021-09-20 PROCEDURE — 85025 COMPLETE CBC W/AUTO DIFF WBC: CPT | Performed by: INTERNAL MEDICINE

## 2021-09-20 PROCEDURE — 27201423 OPTIME MED/SURG SUP & DEVICES STERILE SUPPLY: Performed by: STUDENT IN AN ORGANIZED HEALTH CARE EDUCATION/TRAINING PROGRAM

## 2021-09-20 PROCEDURE — 80053 COMPREHEN METABOLIC PANEL: CPT | Performed by: INTERNAL MEDICINE

## 2021-09-20 PROCEDURE — 27000221 HC OXYGEN, UP TO 24 HOURS

## 2021-09-20 PROCEDURE — 36415 COLL VENOUS BLD VENIPUNCTURE: CPT | Performed by: INTERNAL MEDICINE

## 2021-09-20 PROCEDURE — 99232 SBSQ HOSP IP/OBS MODERATE 35: CPT | Mod: GC,,, | Performed by: INTERNAL MEDICINE

## 2021-09-20 PROCEDURE — 80069 RENAL FUNCTION PANEL: CPT

## 2021-09-20 PROCEDURE — 36000707: Performed by: STUDENT IN AN ORGANIZED HEALTH CARE EDUCATION/TRAINING PROGRAM

## 2021-09-20 PROCEDURE — B4185 PARENTERAL SOL 10 GM LIPIDS: HCPCS | Performed by: FAMILY MEDICINE

## 2021-09-20 PROCEDURE — 82247 BILIRUBIN TOTAL: CPT

## 2021-09-20 PROCEDURE — 99232 PR SUBSEQUENT HOSPITAL CARE,LEVL II: ICD-10-PCS | Mod: GC,,, | Performed by: INTERNAL MEDICINE

## 2021-09-20 PROCEDURE — 25000003 PHARM REV CODE 250: Performed by: STUDENT IN AN ORGANIZED HEALTH CARE EDUCATION/TRAINING PROGRAM

## 2021-09-20 PROCEDURE — 82962 GLUCOSE BLOOD TEST: CPT

## 2021-09-20 PROCEDURE — 43246 PR EGD, FLEX, W/PLCMT, GASTROSTOMY TUBE: ICD-10-PCS | Mod: ,,, | Performed by: STUDENT IN AN ORGANIZED HEALTH CARE EDUCATION/TRAINING PROGRAM

## 2021-09-20 RX ORDER — POTASSIUM CHLORIDE 7.45 MG/ML
10 INJECTION INTRAVENOUS ONCE
Status: COMPLETED | OUTPATIENT
Start: 2021-09-20 | End: 2021-09-20

## 2021-09-20 RX ORDER — CEFAZOLIN SODIUM 1 G/3ML
INJECTION, POWDER, FOR SOLUTION INTRAMUSCULAR; INTRAVENOUS
Status: DISCONTINUED | OUTPATIENT
Start: 2021-09-20 | End: 2021-09-20

## 2021-09-20 RX ORDER — PROPOFOL 10 MG/ML
VIAL (ML) INTRAVENOUS
Status: DISCONTINUED | OUTPATIENT
Start: 2021-09-20 | End: 2021-09-20

## 2021-09-20 RX ORDER — LIDOCAINE HYDROCHLORIDE 10 MG/ML
INJECTION INFILTRATION; PERINEURAL
Status: DISCONTINUED | OUTPATIENT
Start: 2021-09-20 | End: 2021-09-20 | Stop reason: HOSPADM

## 2021-09-20 RX ORDER — LIDOCAINE HYDROCHLORIDE 20 MG/ML
INJECTION, SOLUTION EPIDURAL; INFILTRATION; INTRACAUDAL; PERINEURAL
Status: DISCONTINUED | OUTPATIENT
Start: 2021-09-20 | End: 2021-09-20

## 2021-09-20 RX ADMIN — PROPOFOL 25 MG: 10 INJECTION, EMULSION INTRAVENOUS at 11:09

## 2021-09-20 RX ADMIN — HEPARIN SODIUM 5000 UNITS: 5000 INJECTION INTRAVENOUS; SUBCUTANEOUS at 05:09

## 2021-09-20 RX ADMIN — PIPERACILLIN AND TAZOBACTAM 4.5 G: 4; .5 INJECTION, POWDER, FOR SOLUTION INTRAVENOUS at 02:09

## 2021-09-20 RX ADMIN — SODIUM CHLORIDE: 4.5 INJECTION, SOLUTION INTRAVENOUS at 12:09

## 2021-09-20 RX ADMIN — RISPERIDONE 0.5 MG: 0.5 TABLET ORAL at 09:09

## 2021-09-20 RX ADMIN — ASCORBIC ACID, VITAMIN A PALMITATE, CHOLECALCIFEROL, THIAMINE HYDROCHLORIDE, RIBOFLAVIN-5 PHOSPHATE SODIUM, PYRIDOXINE HYDROCHLORIDE, NIACINAMIDE, DEXPANTHENOL, ALPHA-TOCOPHEROL ACETATE, VITAMIN K1, FOLIC ACID, BIOTIN, CYANOCOBALAMIN: 200; 3300; 200; 6; 3.6; 6; 40; 15; 10; 150; 600; 60; 5 INJECTION, SOLUTION INTRAVENOUS at 05:09

## 2021-09-20 RX ADMIN — PROPOFOL 75 MG: 10 INJECTION, EMULSION INTRAVENOUS at 12:09

## 2021-09-20 RX ADMIN — DONEPEZIL HYDROCHLORIDE 10 MG: 5 TABLET ORAL at 09:09

## 2021-09-20 RX ADMIN — POTASSIUM CHLORIDE 10 MEQ: 7.46 INJECTION, SOLUTION INTRAVENOUS at 08:09

## 2021-09-20 RX ADMIN — I.V. FAT EMULSION 100 ML: 20 EMULSION INTRAVENOUS at 08:09

## 2021-09-20 RX ADMIN — PIPERACILLIN AND TAZOBACTAM 4.5 G: 4; .5 INJECTION, POWDER, FOR SOLUTION INTRAVENOUS at 05:09

## 2021-09-20 RX ADMIN — CEFAZOLIN 2 G: 1 INJECTION, POWDER, FOR SOLUTION INTRAMUSCULAR; INTRAVENOUS; PARENTERAL at 11:09

## 2021-09-20 RX ADMIN — MEMANTINE 5 MG: 5 TABLET ORAL at 09:09

## 2021-09-20 RX ADMIN — LIDOCAINE HYDROCHLORIDE 100 MG: 20 INJECTION, SOLUTION INTRAVENOUS at 11:09

## 2021-09-20 RX ADMIN — QUETIAPINE 300 MG: 200 TABLET ORAL at 09:09

## 2021-09-20 RX ADMIN — HEPARIN SODIUM 5000 UNITS: 5000 INJECTION INTRAVENOUS; SUBCUTANEOUS at 10:09

## 2021-09-20 RX ADMIN — LINEZOLID 600 MG: 600 INJECTION, SOLUTION INTRAVENOUS at 03:09

## 2021-09-21 PROBLEM — Z78.9: Status: RESOLVED | Noted: 2021-09-18 | Resolved: 2021-09-21

## 2021-09-21 PROBLEM — R13.10 DYSPHAGIA: Status: RESOLVED | Noted: 2021-09-17 | Resolved: 2021-09-21

## 2021-09-21 LAB
ALBUMIN SERPL BCP-MCNC: 1.5 G/DL (ref 3.5–5)
ALBUMIN/GLOB SERPL: 0.4 {RATIO}
ALP SERPL-CCNC: 178 U/L (ref 45–115)
ALT SERPL W P-5'-P-CCNC: 63 U/L (ref 16–61)
ANION GAP SERPL CALCULATED.3IONS-SCNC: 13 MMOL/L (ref 7–16)
ANISOCYTOSIS BLD QL SMEAR: ABNORMAL
AST SERPL W P-5'-P-CCNC: 61 U/L (ref 15–37)
BASOPHILS # BLD AUTO: 0.01 K/UL (ref 0–0.2)
BASOPHILS NFR BLD AUTO: 0.1 % (ref 0–1)
BILIRUB SERPL-MCNC: 0.8 MG/DL (ref 0–1.2)
BUN SERPL-MCNC: 20 MG/DL (ref 7–18)
BUN/CREAT SERPL: 14 (ref 6–20)
CALCIUM SERPL-MCNC: 8.5 MG/DL (ref 8.5–10.1)
CHLORIDE SERPL-SCNC: 113 MMOL/L (ref 98–107)
CO2 SERPL-SCNC: 25 MMOL/L (ref 21–32)
CREAT SERPL-MCNC: 1.46 MG/DL (ref 0.7–1.3)
DIFFERENTIAL METHOD BLD: ABNORMAL
EOSINOPHIL # BLD AUTO: 0.08 K/UL (ref 0–0.5)
EOSINOPHIL NFR BLD AUTO: 0.8 % (ref 1–4)
ERYTHROCYTE [DISTWIDTH] IN BLOOD BY AUTOMATED COUNT: 15.4 % (ref 11.5–14.5)
GLOBULIN SER-MCNC: 3.7 G/DL (ref 2–4)
GLUCOSE SERPL-MCNC: 108 MG/DL (ref 74–106)
GLUCOSE SERPL-MCNC: 115 MG/DL (ref 70–105)
GLUCOSE SERPL-MCNC: 120 MG/DL (ref 70–105)
GLUCOSE SERPL-MCNC: 152 MG/DL (ref 70–105)
GLUCOSE SERPL-MCNC: 84 MG/DL (ref 70–105)
GLUCOSE SERPL-MCNC: 89 MG/DL (ref 70–105)
HCO3 UR-SCNC: 26.1 MMOL/L
HCT VFR BLD AUTO: 27.5 % (ref 40–54)
HGB BLD-MCNC: 9.5 G/DL (ref 13.5–18)
HYPOCHROMIA BLD QL SMEAR: ABNORMAL
IMM GRANULOCYTES # BLD AUTO: 0.27 K/UL (ref 0–0.04)
IMM GRANULOCYTES NFR BLD: 2.8 % (ref 0–0.4)
LYMPHOCYTES # BLD AUTO: 1.06 K/UL (ref 1–4.8)
LYMPHOCYTES NFR BLD AUTO: 11 % (ref 27–41)
MAGNESIUM SERPL-MCNC: 2 MG/DL (ref 1.7–2.3)
MCH RBC QN AUTO: 25.7 PG (ref 27–31)
MCHC RBC AUTO-ENTMCNC: 34.5 G/DL (ref 32–36)
MCV RBC AUTO: 74.3 FL (ref 80–96)
MICROCYTES BLD QL SMEAR: ABNORMAL
MONOCYTES # BLD AUTO: 0.53 K/UL (ref 0–0.8)
MONOCYTES NFR BLD AUTO: 5.5 % (ref 2–6)
MPC BLD CALC-MCNC: 11.2 FL (ref 9.4–12.4)
NEUTROPHILS # BLD AUTO: 7.71 K/UL (ref 1.8–7.7)
NEUTROPHILS NFR BLD AUTO: 79.8 % (ref 53–65)
NRBC # BLD AUTO: 0.04 X10E3/UL
NRBC, AUTO (.00): 0.4 %
OVALOCYTES BLD QL SMEAR: ABNORMAL
PCO2 BLDA: 32 MMHG (ref 35–48)
PH SMN: 7.52 [PH] (ref 7.35–7.45)
PHOSPHATE SERPL-MCNC: 2.9 MG/DL (ref 2.5–4.5)
PLATELET # BLD AUTO: 208 K/UL (ref 150–400)
PLATELET MORPHOLOGY: ABNORMAL
PO2 BLDA: 71 MMHG (ref 83–108)
POC A-ADO2: 146 MMHG
POC BASE EXCESS: 3.7 MMOL/L (ref -2–3)
POC SATURATED O2: 96.9 % (ref 95–98)
POTASSIUM SERPL-SCNC: 3.1 MMOL/L (ref 3.5–5.1)
PROCALCITONIN SERPL-MCNC: 1.4 NG/ML
PROT SERPL-MCNC: 5.2 G/DL (ref 6.4–8.2)
RBC # BLD AUTO: 3.7 M/UL (ref 4.6–6.2)
SODIUM SERPL-SCNC: 148 MMOL/L (ref 136–145)
WBC # BLD AUTO: 9.66 K/UL (ref 4.5–11)

## 2021-09-21 PROCEDURE — 63600175 PHARM REV CODE 636 W HCPCS: Performed by: INTERNAL MEDICINE

## 2021-09-21 PROCEDURE — 25000003 PHARM REV CODE 250

## 2021-09-21 PROCEDURE — 83735 ASSAY OF MAGNESIUM: CPT | Performed by: INTERNAL MEDICINE

## 2021-09-21 PROCEDURE — 36415 COLL VENOUS BLD VENIPUNCTURE: CPT | Performed by: INTERNAL MEDICINE

## 2021-09-21 PROCEDURE — 63600175 PHARM REV CODE 636 W HCPCS: Performed by: FAMILY MEDICINE

## 2021-09-21 PROCEDURE — 99232 PR SUBSEQUENT HOSPITAL CARE,LEVL II: ICD-10-PCS | Mod: GC,,, | Performed by: INTERNAL MEDICINE

## 2021-09-21 PROCEDURE — 25000003 PHARM REV CODE 250: Performed by: INTERNAL MEDICINE

## 2021-09-21 PROCEDURE — 99232 SBSQ HOSP IP/OBS MODERATE 35: CPT | Mod: GC,,, | Performed by: INTERNAL MEDICINE

## 2021-09-21 PROCEDURE — 25000003 PHARM REV CODE 250: Performed by: FAMILY MEDICINE

## 2021-09-21 PROCEDURE — 80053 COMPREHEN METABOLIC PANEL: CPT | Performed by: INTERNAL MEDICINE

## 2021-09-21 PROCEDURE — 63600175 PHARM REV CODE 636 W HCPCS: Performed by: HOSPITALIST

## 2021-09-21 PROCEDURE — 87040 BLOOD CULTURE FOR BACTERIA: CPT | Performed by: INTERNAL MEDICINE

## 2021-09-21 PROCEDURE — 25000003 PHARM REV CODE 250: Performed by: HOSPITALIST

## 2021-09-21 PROCEDURE — 11000001 HC ACUTE MED/SURG PRIVATE ROOM

## 2021-09-21 PROCEDURE — 82962 GLUCOSE BLOOD TEST: CPT

## 2021-09-21 PROCEDURE — B4185 PARENTERAL SOL 10 GM LIPIDS: HCPCS | Performed by: INTERNAL MEDICINE

## 2021-09-21 PROCEDURE — 85025 COMPLETE CBC W/AUTO DIFF WBC: CPT | Performed by: INTERNAL MEDICINE

## 2021-09-21 PROCEDURE — 84100 ASSAY OF PHOSPHORUS: CPT | Performed by: INTERNAL MEDICINE

## 2021-09-21 RX ORDER — POTASSIUM CHLORIDE 7.45 MG/ML
40 INJECTION INTRAVENOUS ONCE
Status: COMPLETED | OUTPATIENT
Start: 2021-09-21 | End: 2021-09-21

## 2021-09-21 RX ORDER — LINEZOLID 600 MG/1
600 TABLET, FILM COATED ORAL EVERY 12 HOURS
Status: DISCONTINUED | OUTPATIENT
Start: 2021-09-21 | End: 2021-09-23

## 2021-09-21 RX ORDER — POTASSIUM CHLORIDE 20 MEQ/1
40 TABLET, EXTENDED RELEASE ORAL 2 TIMES DAILY
Status: COMPLETED | OUTPATIENT
Start: 2021-09-21 | End: 2021-09-23

## 2021-09-21 RX ORDER — PHYTONADIONE 10 MG/ML
5 INJECTION, EMULSION INTRAMUSCULAR; INTRAVENOUS; SUBCUTANEOUS
Status: DISCONTINUED | OUTPATIENT
Start: 2021-09-21 | End: 2021-09-27 | Stop reason: HOSPADM

## 2021-09-21 RX ADMIN — QUETIAPINE 300 MG: 200 TABLET ORAL at 08:09

## 2021-09-21 RX ADMIN — LINEZOLID 600 MG: 600 TABLET, FILM COATED ORAL at 09:09

## 2021-09-21 RX ADMIN — QUETIAPINE 300 MG: 200 TABLET ORAL at 09:09

## 2021-09-21 RX ADMIN — PIPERACILLIN AND TAZOBACTAM 4.5 G: 4; .5 INJECTION, POWDER, FOR SOLUTION INTRAVENOUS at 05:09

## 2021-09-21 RX ADMIN — HEPARIN SODIUM 5000 UNITS: 5000 INJECTION INTRAVENOUS; SUBCUTANEOUS at 09:09

## 2021-09-21 RX ADMIN — LINEZOLID 600 MG: 600 INJECTION, SOLUTION INTRAVENOUS at 08:09

## 2021-09-21 RX ADMIN — HEPARIN SODIUM 5000 UNITS: 5000 INJECTION INTRAVENOUS; SUBCUTANEOUS at 05:09

## 2021-09-21 RX ADMIN — PIPERACILLIN AND TAZOBACTAM 4.5 G: 4; .5 INJECTION, POWDER, FOR SOLUTION INTRAVENOUS at 09:09

## 2021-09-21 RX ADMIN — PHYTONADIONE 5 MG: 10 INJECTION, EMULSION INTRAMUSCULAR; INTRAVENOUS; SUBCUTANEOUS at 08:09

## 2021-09-21 RX ADMIN — POTASSIUM CHLORIDE 40 MEQ: 1500 TABLET, EXTENDED RELEASE ORAL at 09:09

## 2021-09-21 RX ADMIN — ACETAMINOPHEN 1000 MG: 500 TABLET ORAL at 12:09

## 2021-09-21 RX ADMIN — MEMANTINE 5 MG: 5 TABLET ORAL at 09:09

## 2021-09-21 RX ADMIN — BISACODYL 10 MG: 10 SUPPOSITORY RECTAL at 08:09

## 2021-09-21 RX ADMIN — MEMANTINE 5 MG: 5 TABLET ORAL at 08:09

## 2021-09-21 RX ADMIN — SODIUM CHLORIDE: 4.5 INJECTION, SOLUTION INTRAVENOUS at 11:09

## 2021-09-21 RX ADMIN — DONEPEZIL HYDROCHLORIDE 10 MG: 5 TABLET ORAL at 09:09

## 2021-09-21 RX ADMIN — I.V. FAT EMULSION 100 ML: 20 EMULSION INTRAVENOUS at 08:09

## 2021-09-21 RX ADMIN — HEPARIN SODIUM 5000 UNITS: 5000 INJECTION INTRAVENOUS; SUBCUTANEOUS at 01:09

## 2021-09-21 RX ADMIN — RISPERIDONE 0.5 MG: 0.5 TABLET ORAL at 09:09

## 2021-09-21 RX ADMIN — POTASSIUM CHLORIDE 40 MEQ: 7.46 INJECTION, SOLUTION INTRAVENOUS at 08:09

## 2021-09-21 RX ADMIN — PIPERACILLIN AND TAZOBACTAM 4.5 G: 4; .5 INJECTION, POWDER, FOR SOLUTION INTRAVENOUS at 01:09

## 2021-09-21 RX ADMIN — RISPERIDONE 0.5 MG: 0.5 TABLET ORAL at 08:09

## 2021-09-22 PROBLEM — R79.89 ELEVATED LFTS: Status: ACTIVE | Noted: 2021-09-22

## 2021-09-22 LAB
ALBUMIN SERPL BCP-MCNC: 1.4 G/DL (ref 3.5–5)
ALBUMIN/GLOB SERPL: 0.4 {RATIO}
ALP SERPL-CCNC: 229 U/L (ref 45–115)
ALT SERPL W P-5'-P-CCNC: 78 U/L (ref 16–61)
ANION GAP SERPL CALCULATED.3IONS-SCNC: 15 MMOL/L (ref 7–16)
ANISOCYTOSIS BLD QL SMEAR: ABNORMAL
AST SERPL W P-5'-P-CCNC: 77 U/L (ref 15–37)
BASOPHILS # BLD AUTO: 0.02 K/UL (ref 0–0.2)
BASOPHILS NFR BLD AUTO: 0.2 % (ref 0–1)
BILIRUB SERPL-MCNC: 0.7 MG/DL (ref 0–1.2)
BUN SERPL-MCNC: 16 MG/DL (ref 7–18)
BUN/CREAT SERPL: 16 (ref 6–20)
CALCIUM SERPL-MCNC: 8.6 MG/DL (ref 8.5–10.1)
CHLORIDE SERPL-SCNC: 112 MMOL/L (ref 98–107)
CO2 SERPL-SCNC: 23 MMOL/L (ref 21–32)
CREAT SERPL-MCNC: 1.02 MG/DL (ref 0.7–1.3)
DIFFERENTIAL METHOD BLD: ABNORMAL
EOSINOPHIL # BLD AUTO: 0.12 K/UL (ref 0–0.5)
EOSINOPHIL NFR BLD AUTO: 1.2 % (ref 1–4)
EOSINOPHIL NFR BLD MANUAL: 2 % (ref 1–4)
ERYTHROCYTE [DISTWIDTH] IN BLOOD BY AUTOMATED COUNT: 15.5 % (ref 11.5–14.5)
GLOBULIN SER-MCNC: 3.8 G/DL (ref 2–4)
GLUCOSE SERPL-MCNC: 118 MG/DL (ref 70–105)
GLUCOSE SERPL-MCNC: 131 MG/DL (ref 74–106)
GLUCOSE SERPL-MCNC: 132 MG/DL (ref 70–105)
GLUCOSE SERPL-MCNC: 133 MG/DL (ref 70–105)
GLUCOSE SERPL-MCNC: 155 MG/DL (ref 70–105)
HCT VFR BLD AUTO: 27.1 % (ref 40–54)
HGB BLD-MCNC: 9.3 G/DL (ref 13.5–18)
IMM GRANULOCYTES # BLD AUTO: 0.52 K/UL (ref 0–0.04)
IMM GRANULOCYTES NFR BLD: 5.4 % (ref 0–0.4)
LYMPHOCYTES # BLD AUTO: 0.94 K/UL (ref 1–4.8)
LYMPHOCYTES NFR BLD AUTO: 9.7 % (ref 27–41)
LYMPHOCYTES NFR BLD MANUAL: 11 % (ref 27–41)
MAGNESIUM SERPL-MCNC: 2.1 MG/DL (ref 1.7–2.3)
MCH RBC QN AUTO: 25.5 PG (ref 27–31)
MCHC RBC AUTO-ENTMCNC: 34.3 G/DL (ref 32–36)
MCV RBC AUTO: 74.5 FL (ref 80–96)
METAMYELOCYTES NFR BLD MANUAL: 2 %
MICROCYTES BLD QL SMEAR: ABNORMAL
MONOCYTES # BLD AUTO: 0.61 K/UL (ref 0–0.8)
MONOCYTES NFR BLD AUTO: 6.3 % (ref 2–6)
MONOCYTES NFR BLD MANUAL: 4 % (ref 2–6)
MPC BLD CALC-MCNC: 10.5 FL (ref 9.4–12.4)
NEUTROPHILS # BLD AUTO: 7.44 K/UL (ref 1.8–7.7)
NEUTROPHILS NFR BLD AUTO: 77.2 % (ref 53–65)
NEUTS BAND NFR BLD MANUAL: 6 % (ref 1–5)
NEUTS SEG NFR BLD MANUAL: 75 % (ref 50–62)
NRBC # BLD AUTO: 0 X10E3/UL
NRBC, AUTO (.00): 0 %
OVALOCYTES BLD QL SMEAR: ABNORMAL
PHOSPHATE SERPL-MCNC: 2.6 MG/DL (ref 2.5–4.5)
PLATELET # BLD AUTO: 223 K/UL (ref 150–400)
PLATELET MORPHOLOGY: ABNORMAL
POLYCHROMASIA BLD QL SMEAR: ABNORMAL
POTASSIUM SERPL-SCNC: 4.2 MMOL/L (ref 3.5–5.1)
PROT SERPL-MCNC: 5.2 G/DL (ref 6.4–8.2)
RBC # BLD AUTO: 3.64 M/UL (ref 4.6–6.2)
SODIUM SERPL-SCNC: 146 MMOL/L (ref 136–145)
WBC # BLD AUTO: 9.65 K/UL (ref 4.5–11)

## 2021-09-22 PROCEDURE — 84100 ASSAY OF PHOSPHORUS: CPT

## 2021-09-22 PROCEDURE — 85025 COMPLETE CBC W/AUTO DIFF WBC: CPT | Performed by: INTERNAL MEDICINE

## 2021-09-22 PROCEDURE — 25000003 PHARM REV CODE 250: Performed by: FAMILY MEDICINE

## 2021-09-22 PROCEDURE — 25000003 PHARM REV CODE 250: Performed by: INTERNAL MEDICINE

## 2021-09-22 PROCEDURE — 80053 COMPREHEN METABOLIC PANEL: CPT | Performed by: INTERNAL MEDICINE

## 2021-09-22 PROCEDURE — 99232 SBSQ HOSP IP/OBS MODERATE 35: CPT | Mod: GC,,, | Performed by: INTERNAL MEDICINE

## 2021-09-22 PROCEDURE — 25000003 PHARM REV CODE 250: Performed by: HOSPITALIST

## 2021-09-22 PROCEDURE — 94761 N-INVAS EAR/PLS OXIMETRY MLT: CPT

## 2021-09-22 PROCEDURE — 63600175 PHARM REV CODE 636 W HCPCS: Performed by: FAMILY MEDICINE

## 2021-09-22 PROCEDURE — 63600175 PHARM REV CODE 636 W HCPCS: Performed by: HOSPITALIST

## 2021-09-22 PROCEDURE — 82962 GLUCOSE BLOOD TEST: CPT

## 2021-09-22 PROCEDURE — 25000003 PHARM REV CODE 250

## 2021-09-22 PROCEDURE — 27000221 HC OXYGEN, UP TO 24 HOURS

## 2021-09-22 PROCEDURE — 99232 PR SUBSEQUENT HOSPITAL CARE,LEVL II: ICD-10-PCS | Mod: GC,,, | Performed by: INTERNAL MEDICINE

## 2021-09-22 PROCEDURE — 83735 ASSAY OF MAGNESIUM: CPT

## 2021-09-22 PROCEDURE — 11000001 HC ACUTE MED/SURG PRIVATE ROOM

## 2021-09-22 PROCEDURE — 36415 COLL VENOUS BLD VENIPUNCTURE: CPT | Performed by: INTERNAL MEDICINE

## 2021-09-22 RX ADMIN — HEPARIN SODIUM 5000 UNITS: 5000 INJECTION INTRAVENOUS; SUBCUTANEOUS at 02:09

## 2021-09-22 RX ADMIN — LINEZOLID 600 MG: 600 TABLET, FILM COATED ORAL at 09:09

## 2021-09-22 RX ADMIN — HEPARIN SODIUM 5000 UNITS: 5000 INJECTION INTRAVENOUS; SUBCUTANEOUS at 05:09

## 2021-09-22 RX ADMIN — SODIUM CHLORIDE: 4.5 INJECTION, SOLUTION INTRAVENOUS at 02:09

## 2021-09-22 RX ADMIN — PIPERACILLIN AND TAZOBACTAM 4.5 G: 4; .5 INJECTION, POWDER, FOR SOLUTION INTRAVENOUS at 11:09

## 2021-09-22 RX ADMIN — DONEPEZIL HYDROCHLORIDE 10 MG: 5 TABLET ORAL at 09:09

## 2021-09-22 RX ADMIN — RISPERIDONE 0.5 MG: 0.5 TABLET ORAL at 09:09

## 2021-09-22 RX ADMIN — PIPERACILLIN AND TAZOBACTAM 4.5 G: 4; .5 INJECTION, POWDER, FOR SOLUTION INTRAVENOUS at 02:09

## 2021-09-22 RX ADMIN — PIPERACILLIN AND TAZOBACTAM 4.5 G: 4; .5 INJECTION, POWDER, FOR SOLUTION INTRAVENOUS at 05:09

## 2021-09-22 RX ADMIN — POTASSIUM CHLORIDE 40 MEQ: 1500 TABLET, EXTENDED RELEASE ORAL at 09:09

## 2021-09-22 RX ADMIN — MEMANTINE 5 MG: 5 TABLET ORAL at 09:09

## 2021-09-22 RX ADMIN — QUETIAPINE 300 MG: 200 TABLET ORAL at 09:09

## 2021-09-22 RX ADMIN — HEPARIN SODIUM 5000 UNITS: 5000 INJECTION INTRAVENOUS; SUBCUTANEOUS at 09:09

## 2021-09-22 RX ADMIN — SODIUM CHLORIDE 75 ML/HR: 4.5 INJECTION, SOLUTION INTRAVENOUS at 09:09

## 2021-09-22 RX ADMIN — BISACODYL 10 MG: 10 SUPPOSITORY RECTAL at 09:09

## 2021-09-23 LAB
ALBUMIN SERPL BCP-MCNC: 1.5 G/DL (ref 3.5–5)
ALBUMIN/GLOB SERPL: 0.3 {RATIO}
ALP SERPL-CCNC: 233 U/L (ref 45–115)
ALT SERPL W P-5'-P-CCNC: 88 U/L (ref 16–61)
ANION GAP SERPL CALCULATED.3IONS-SCNC: 12 MMOL/L (ref 7–16)
ANISOCYTOSIS BLD QL SMEAR: ABNORMAL
AST SERPL W P-5'-P-CCNC: 63 U/L (ref 15–37)
BACTERIA BLD CULT: NORMAL
BACTERIA BLD CULT: NORMAL
BASOPHILS # BLD AUTO: 0.02 K/UL (ref 0–0.2)
BASOPHILS NFR BLD AUTO: 0.2 % (ref 0–1)
BILIRUB SERPL-MCNC: 0.7 MG/DL (ref 0–1.2)
BUN SERPL-MCNC: 18 MG/DL (ref 7–18)
BUN/CREAT SERPL: 14 (ref 6–20)
CALCIUM SERPL-MCNC: 9.2 MG/DL (ref 8.5–10.1)
CHLORIDE SERPL-SCNC: 112 MMOL/L (ref 98–107)
CO2 SERPL-SCNC: 24 MMOL/L (ref 21–32)
CREAT SERPL-MCNC: 1.27 MG/DL (ref 0.7–1.3)
DIFFERENTIAL METHOD BLD: ABNORMAL
EOSINOPHIL # BLD AUTO: 0.11 K/UL (ref 0–0.5)
EOSINOPHIL NFR BLD AUTO: 1 % (ref 1–4)
EOSINOPHIL NFR BLD MANUAL: 2 % (ref 1–4)
ERYTHROCYTE [DISTWIDTH] IN BLOOD BY AUTOMATED COUNT: 15 % (ref 11.5–14.5)
GLOBULIN SER-MCNC: 5 G/DL (ref 2–4)
GLUCOSE SERPL-MCNC: 105 MG/DL (ref 70–105)
GLUCOSE SERPL-MCNC: 116 MG/DL (ref 74–106)
GLUCOSE SERPL-MCNC: 139 MG/DL (ref 70–105)
GLUCOSE SERPL-MCNC: 79 MG/DL (ref 70–105)
GLUCOSE SERPL-MCNC: 94 MG/DL (ref 70–105)
HAV IGM SER QL: NORMAL
HBV CORE IGM SER QL: NORMAL
HBV SURFACE AG SERPL QL IA: NORMAL
HCT VFR BLD AUTO: 27.3 % (ref 40–54)
HCV AB SER QL: NORMAL
HGB BLD-MCNC: 9.4 G/DL (ref 13.5–18)
IMM GRANULOCYTES # BLD AUTO: 0.77 K/UL (ref 0–0.04)
IMM GRANULOCYTES NFR BLD: 7.1 % (ref 0–0.4)
LYMPHOCYTES # BLD AUTO: 1.16 K/UL (ref 1–4.8)
LYMPHOCYTES NFR BLD AUTO: 10.7 % (ref 27–41)
LYMPHOCYTES NFR BLD MANUAL: 11 % (ref 27–41)
MCH RBC QN AUTO: 25.1 PG (ref 27–31)
MCHC RBC AUTO-ENTMCNC: 34.4 G/DL (ref 32–36)
MCV RBC AUTO: 72.8 FL (ref 80–96)
METAMYELOCYTES NFR BLD MANUAL: 2 %
MICROCYTES BLD QL SMEAR: ABNORMAL
MONOCYTES # BLD AUTO: 0.64 K/UL (ref 0–0.8)
MONOCYTES NFR BLD AUTO: 5.9 % (ref 2–6)
MONOCYTES NFR BLD MANUAL: 7 % (ref 2–6)
MPC BLD CALC-MCNC: 10.3 FL (ref 9.4–12.4)
NEUTROPHILS # BLD AUTO: 8.14 K/UL (ref 1.8–7.7)
NEUTROPHILS NFR BLD AUTO: 75.1 % (ref 53–65)
NEUTS BAND NFR BLD MANUAL: 1 % (ref 1–5)
NEUTS SEG NFR BLD MANUAL: 77 % (ref 50–62)
NRBC # BLD AUTO: 0.03 X10E3/UL
NRBC BLD MANUAL-RTO: 4 /100 WBC
NRBC, AUTO (.00): 0.3 %
OVALOCYTES BLD QL SMEAR: ABNORMAL
PLATELET # BLD AUTO: 278 K/UL (ref 150–400)
PLATELET MORPHOLOGY: ABNORMAL
POLYCHROMASIA BLD QL SMEAR: ABNORMAL
POTASSIUM SERPL-SCNC: 4.3 MMOL/L (ref 3.5–5.1)
PROT SERPL-MCNC: 6.5 G/DL (ref 6.4–8.2)
RBC # BLD AUTO: 3.75 M/UL (ref 4.6–6.2)
SODIUM SERPL-SCNC: 144 MMOL/L (ref 136–145)
WBC # BLD AUTO: 10.84 K/UL (ref 4.5–11)

## 2021-09-23 PROCEDURE — 25000003 PHARM REV CODE 250: Performed by: HOSPITALIST

## 2021-09-23 PROCEDURE — 25000003 PHARM REV CODE 250: Performed by: FAMILY MEDICINE

## 2021-09-23 PROCEDURE — 63600175 PHARM REV CODE 636 W HCPCS: Performed by: FAMILY MEDICINE

## 2021-09-23 PROCEDURE — 25000003 PHARM REV CODE 250

## 2021-09-23 PROCEDURE — 80053 COMPREHEN METABOLIC PANEL: CPT | Performed by: INTERNAL MEDICINE

## 2021-09-23 PROCEDURE — 85025 COMPLETE CBC W/AUTO DIFF WBC: CPT | Performed by: INTERNAL MEDICINE

## 2021-09-23 PROCEDURE — 63600175 PHARM REV CODE 636 W HCPCS: Performed by: HOSPITALIST

## 2021-09-23 PROCEDURE — 82962 GLUCOSE BLOOD TEST: CPT

## 2021-09-23 PROCEDURE — 25000003 PHARM REV CODE 250: Performed by: INTERNAL MEDICINE

## 2021-09-23 PROCEDURE — 99232 SBSQ HOSP IP/OBS MODERATE 35: CPT | Mod: ,,, | Performed by: INTERNAL MEDICINE

## 2021-09-23 PROCEDURE — 80074 ACUTE HEPATITIS PANEL: CPT

## 2021-09-23 PROCEDURE — 11000001 HC ACUTE MED/SURG PRIVATE ROOM

## 2021-09-23 PROCEDURE — 94761 N-INVAS EAR/PLS OXIMETRY MLT: CPT

## 2021-09-23 PROCEDURE — 99232 PR SUBSEQUENT HOSPITAL CARE,LEVL II: ICD-10-PCS | Mod: ,,, | Performed by: INTERNAL MEDICINE

## 2021-09-23 PROCEDURE — 36415 COLL VENOUS BLD VENIPUNCTURE: CPT | Performed by: INTERNAL MEDICINE

## 2021-09-23 RX ADMIN — RISPERIDONE 0.5 MG: 0.5 TABLET ORAL at 08:09

## 2021-09-23 RX ADMIN — BISACODYL 10 MG: 10 SUPPOSITORY RECTAL at 08:09

## 2021-09-23 RX ADMIN — RISPERIDONE 0.5 MG: 0.5 TABLET ORAL at 09:09

## 2021-09-23 RX ADMIN — MEMANTINE 5 MG: 5 TABLET ORAL at 09:09

## 2021-09-23 RX ADMIN — HEPARIN SODIUM 5000 UNITS: 5000 INJECTION INTRAVENOUS; SUBCUTANEOUS at 05:09

## 2021-09-23 RX ADMIN — HEPARIN SODIUM 5000 UNITS: 5000 INJECTION INTRAVENOUS; SUBCUTANEOUS at 03:09

## 2021-09-23 RX ADMIN — QUETIAPINE 300 MG: 200 TABLET ORAL at 08:09

## 2021-09-23 RX ADMIN — QUETIAPINE 300 MG: 200 TABLET ORAL at 09:09

## 2021-09-23 RX ADMIN — POTASSIUM CHLORIDE 40 MEQ: 1500 TABLET, EXTENDED RELEASE ORAL at 08:09

## 2021-09-23 RX ADMIN — LINEZOLID 600 MG: 600 TABLET, FILM COATED ORAL at 08:09

## 2021-09-23 RX ADMIN — PIPERACILLIN AND TAZOBACTAM 4.5 G: 4; .5 INJECTION, POWDER, FOR SOLUTION INTRAVENOUS at 08:09

## 2021-09-23 RX ADMIN — SODIUM CHLORIDE: 4.5 INJECTION, SOLUTION INTRAVENOUS at 11:09

## 2021-09-23 RX ADMIN — MEMANTINE 5 MG: 5 TABLET ORAL at 08:09

## 2021-09-23 RX ADMIN — PIPERACILLIN AND TAZOBACTAM 4.5 G: 4; .5 INJECTION, POWDER, FOR SOLUTION INTRAVENOUS at 01:09

## 2021-09-23 RX ADMIN — HEPARIN SODIUM 5000 UNITS: 5000 INJECTION INTRAVENOUS; SUBCUTANEOUS at 09:09

## 2021-09-23 RX ADMIN — DONEPEZIL HYDROCHLORIDE 10 MG: 5 TABLET ORAL at 09:09

## 2021-09-24 PROBLEM — R74.01 TRANSAMINITIS: Status: ACTIVE | Noted: 2021-09-24

## 2021-09-24 PROBLEM — J69.0 ASPIRATION PNEUMONIA: Status: RESOLVED | Noted: 2021-09-17 | Resolved: 2021-09-24

## 2021-09-24 PROBLEM — R74.01 TRANSAMINITIS: Status: RESOLVED | Noted: 2021-09-24 | Resolved: 2021-09-24

## 2021-09-24 PROBLEM — R50.9 FEVER: Status: ACTIVE | Noted: 2021-09-24

## 2021-09-24 PROBLEM — E87.6 HYPOKALEMIA: Status: RESOLVED | Noted: 2021-09-15 | Resolved: 2021-09-24

## 2021-09-24 LAB
ALBUMIN SERPL BCP-MCNC: 1.5 G/DL (ref 3.5–5)
ALBUMIN/GLOB SERPL: 0.4 {RATIO}
ALP SERPL-CCNC: 211 U/L (ref 45–115)
ALT SERPL W P-5'-P-CCNC: 92 U/L (ref 16–61)
ANION GAP SERPL CALCULATED.3IONS-SCNC: 10 MMOL/L (ref 7–16)
ANISOCYTOSIS BLD QL SMEAR: ABNORMAL
AST SERPL W P-5'-P-CCNC: 68 U/L (ref 15–37)
BACTERIA #/AREA URNS HPF: NORMAL /HPF
BASOPHILS # BLD AUTO: 0.01 K/UL (ref 0–0.2)
BASOPHILS NFR BLD AUTO: 0.1 % (ref 0–1)
BILIRUB SERPL-MCNC: 0.4 MG/DL (ref 0–1.2)
BILIRUB UR QL STRIP: NEGATIVE
BUN SERPL-MCNC: 22 MG/DL (ref 7–18)
BUN/CREAT SERPL: 21 (ref 6–20)
CALCIUM SERPL-MCNC: 8.6 MG/DL (ref 8.5–10.1)
CHLORIDE SERPL-SCNC: 113 MMOL/L (ref 98–107)
CLARITY UR: CLEAR
CO2 SERPL-SCNC: 25 MMOL/L (ref 21–32)
COLOR UR: ABNORMAL
CREAT SERPL-MCNC: 1.07 MG/DL (ref 0.7–1.3)
DIFFERENTIAL METHOD BLD: ABNORMAL
EOSINOPHIL # BLD AUTO: 0.13 K/UL (ref 0–0.5)
EOSINOPHIL NFR BLD AUTO: 1.3 % (ref 1–4)
EOSINOPHIL NFR BLD MANUAL: 2 % (ref 1–4)
ERYTHROCYTE [DISTWIDTH] IN BLOOD BY AUTOMATED COUNT: 15.4 % (ref 11.5–14.5)
GLOBULIN SER-MCNC: 3.8 G/DL (ref 2–4)
GLUCOSE SERPL-MCNC: 122 MG/DL (ref 70–105)
GLUCOSE SERPL-MCNC: 128 MG/DL (ref 74–106)
GLUCOSE SERPL-MCNC: 139 MG/DL (ref 70–105)
GLUCOSE SERPL-MCNC: 91 MG/DL (ref 70–105)
GLUCOSE UR STRIP-MCNC: NEGATIVE MG/DL
HCT VFR BLD AUTO: 25.6 % (ref 40–54)
HGB BLD-MCNC: 9 G/DL (ref 13.5–18)
IMM GRANULOCYTES # BLD AUTO: 0.6 K/UL (ref 0–0.04)
IMM GRANULOCYTES NFR BLD: 6.1 % (ref 0–0.4)
KETONES UR STRIP-SCNC: NEGATIVE MG/DL
LEUKOCYTE ESTERASE UR QL STRIP: NEGATIVE
LYMPHOCYTES # BLD AUTO: 1.27 K/UL (ref 1–4.8)
LYMPHOCYTES NFR BLD AUTO: 13 % (ref 27–41)
LYMPHOCYTES NFR BLD MANUAL: 12 % (ref 27–41)
MAGNESIUM SERPL-MCNC: 2.1 MG/DL (ref 1.7–2.3)
MCH RBC QN AUTO: 25.6 PG (ref 27–31)
MCHC RBC AUTO-ENTMCNC: 35.2 G/DL (ref 32–36)
MCV RBC AUTO: 72.9 FL (ref 80–96)
METAMYELOCYTES NFR BLD MANUAL: 1 %
MICROCYTES BLD QL SMEAR: ABNORMAL
MONOCYTES # BLD AUTO: 0.52 K/UL (ref 0–0.8)
MONOCYTES NFR BLD AUTO: 5.3 % (ref 2–6)
MONOCYTES NFR BLD MANUAL: 4 % (ref 2–6)
MPC BLD CALC-MCNC: 10 FL (ref 9.4–12.4)
MUCOUS THREADS #/AREA URNS HPF: NORMAL /HPF
MYELOCYTES NFR BLD MANUAL: 3 %
NEUTROPHILS # BLD AUTO: 7.26 K/UL (ref 1.8–7.7)
NEUTROPHILS NFR BLD AUTO: 74.2 % (ref 53–65)
NEUTS BAND NFR BLD MANUAL: 3 % (ref 1–5)
NEUTS SEG NFR BLD MANUAL: 75 % (ref 50–62)
NITRITE UR QL STRIP: NEGATIVE
NRBC # BLD AUTO: 0.02 X10E3/UL
NRBC, AUTO (.00): 0.2 %
OVALOCYTES BLD QL SMEAR: ABNORMAL
PH UR STRIP: 6 PH UNITS
PHOSPHATE SERPL-MCNC: 2.6 MG/DL (ref 2.5–4.5)
PLATELET # BLD AUTO: 307 K/UL (ref 150–400)
PLATELET MORPHOLOGY: ABNORMAL
POLYCHROMASIA BLD QL SMEAR: ABNORMAL
POTASSIUM SERPL-SCNC: 4.4 MMOL/L (ref 3.5–5.1)
PROT SERPL-MCNC: 5.3 G/DL (ref 6.4–8.2)
PROT UR QL STRIP: NEGATIVE
RBC # BLD AUTO: 3.51 M/UL (ref 4.6–6.2)
RBC # UR STRIP: ABNORMAL /UL
RBC #/AREA URNS HPF: NORMAL /HPF
SODIUM SERPL-SCNC: 144 MMOL/L (ref 136–145)
SP GR UR STRIP: <=1.005
SQUAMOUS #/AREA URNS LPF: NORMAL /LPF
TARGETS BLD QL SMEAR: ABNORMAL
TRICHOMONAS #/AREA URNS HPF: NORMAL /HPF
UROBILINOGEN UR STRIP-ACNC: 0.2 MG/DL
WBC # BLD AUTO: 9.79 K/UL (ref 4.5–11)
WBC #/AREA URNS HPF: NORMAL /HPF
YEAST #/AREA URNS HPF: NORMAL /HPF

## 2021-09-24 PROCEDURE — 36415 COLL VENOUS BLD VENIPUNCTURE: CPT | Performed by: INTERNAL MEDICINE

## 2021-09-24 PROCEDURE — 11000001 HC ACUTE MED/SURG PRIVATE ROOM

## 2021-09-24 PROCEDURE — 99232 PR SUBSEQUENT HOSPITAL CARE,LEVL II: ICD-10-PCS | Mod: GC,,, | Performed by: INTERNAL MEDICINE

## 2021-09-24 PROCEDURE — 83735 ASSAY OF MAGNESIUM: CPT

## 2021-09-24 PROCEDURE — 63600175 PHARM REV CODE 636 W HCPCS: Performed by: HOSPITALIST

## 2021-09-24 PROCEDURE — 99232 SBSQ HOSP IP/OBS MODERATE 35: CPT | Mod: GC,,, | Performed by: INTERNAL MEDICINE

## 2021-09-24 PROCEDURE — 25000003 PHARM REV CODE 250: Performed by: HOSPITALIST

## 2021-09-24 PROCEDURE — 25000003 PHARM REV CODE 250

## 2021-09-24 PROCEDURE — 84460 ALANINE AMINO (ALT) (SGPT): CPT | Performed by: INTERNAL MEDICINE

## 2021-09-24 PROCEDURE — 82962 GLUCOSE BLOOD TEST: CPT

## 2021-09-24 PROCEDURE — 81001 URINALYSIS AUTO W/SCOPE: CPT

## 2021-09-24 PROCEDURE — 85025 COMPLETE CBC W/AUTO DIFF WBC: CPT | Performed by: INTERNAL MEDICINE

## 2021-09-24 PROCEDURE — 80053 COMPREHEN METABOLIC PANEL: CPT | Performed by: INTERNAL MEDICINE

## 2021-09-24 PROCEDURE — 84100 ASSAY OF PHOSPHORUS: CPT

## 2021-09-24 PROCEDURE — 81003 URINALYSIS AUTO W/O SCOPE: CPT

## 2021-09-24 PROCEDURE — 94761 N-INVAS EAR/PLS OXIMETRY MLT: CPT

## 2021-09-24 PROCEDURE — 25000003 PHARM REV CODE 250: Performed by: FAMILY MEDICINE

## 2021-09-24 RX ADMIN — HEPARIN SODIUM 5000 UNITS: 5000 INJECTION INTRAVENOUS; SUBCUTANEOUS at 06:09

## 2021-09-24 RX ADMIN — DONEPEZIL HYDROCHLORIDE 10 MG: 5 TABLET ORAL at 08:09

## 2021-09-24 RX ADMIN — HEPARIN SODIUM 5000 UNITS: 5000 INJECTION INTRAVENOUS; SUBCUTANEOUS at 03:09

## 2021-09-24 RX ADMIN — MEMANTINE 5 MG: 5 TABLET ORAL at 08:09

## 2021-09-24 RX ADMIN — ACETAMINOPHEN 1000 MG: 500 TABLET ORAL at 04:09

## 2021-09-24 RX ADMIN — RISPERIDONE 0.5 MG: 0.5 TABLET ORAL at 08:09

## 2021-09-24 RX ADMIN — HEPARIN SODIUM 5000 UNITS: 5000 INJECTION INTRAVENOUS; SUBCUTANEOUS at 09:09

## 2021-09-24 RX ADMIN — SODIUM CHLORIDE: 4.5 INJECTION, SOLUTION INTRAVENOUS at 05:09

## 2021-09-24 RX ADMIN — QUETIAPINE 300 MG: 200 TABLET ORAL at 08:09

## 2021-09-25 LAB
GLUCOSE SERPL-MCNC: 106 MG/DL (ref 70–105)
GLUCOSE SERPL-MCNC: 116 MG/DL (ref 70–105)
GLUCOSE SERPL-MCNC: 94 MG/DL (ref 70–105)

## 2021-09-25 PROCEDURE — 82962 GLUCOSE BLOOD TEST: CPT

## 2021-09-25 PROCEDURE — 25000003 PHARM REV CODE 250

## 2021-09-25 PROCEDURE — 25000003 PHARM REV CODE 250: Performed by: HOSPITALIST

## 2021-09-25 PROCEDURE — 11000001 HC ACUTE MED/SURG PRIVATE ROOM

## 2021-09-25 PROCEDURE — 63600175 PHARM REV CODE 636 W HCPCS: Performed by: HOSPITALIST

## 2021-09-25 PROCEDURE — 99232 PR SUBSEQUENT HOSPITAL CARE,LEVL II: ICD-10-PCS | Mod: GC,,, | Performed by: INTERNAL MEDICINE

## 2021-09-25 PROCEDURE — 94761 N-INVAS EAR/PLS OXIMETRY MLT: CPT

## 2021-09-25 PROCEDURE — 25000003 PHARM REV CODE 250: Performed by: FAMILY MEDICINE

## 2021-09-25 PROCEDURE — 99232 SBSQ HOSP IP/OBS MODERATE 35: CPT | Mod: GC,,, | Performed by: INTERNAL MEDICINE

## 2021-09-25 RX ADMIN — HEPARIN SODIUM 5000 UNITS: 5000 INJECTION INTRAVENOUS; SUBCUTANEOUS at 05:09

## 2021-09-25 RX ADMIN — QUETIAPINE 300 MG: 200 TABLET ORAL at 09:09

## 2021-09-25 RX ADMIN — RISPERIDONE 0.5 MG: 0.5 TABLET ORAL at 09:09

## 2021-09-25 RX ADMIN — MEMANTINE 5 MG: 5 TABLET ORAL at 09:09

## 2021-09-25 RX ADMIN — DONEPEZIL HYDROCHLORIDE 10 MG: 5 TABLET ORAL at 09:09

## 2021-09-25 RX ADMIN — SODIUM CHLORIDE: 4.5 INJECTION, SOLUTION INTRAVENOUS at 03:09

## 2021-09-25 RX ADMIN — HEPARIN SODIUM 5000 UNITS: 5000 INJECTION INTRAVENOUS; SUBCUTANEOUS at 02:09

## 2021-09-25 RX ADMIN — SODIUM CHLORIDE: 4.5 INJECTION, SOLUTION INTRAVENOUS at 09:09

## 2021-09-25 RX ADMIN — HEPARIN SODIUM 5000 UNITS: 5000 INJECTION INTRAVENOUS; SUBCUTANEOUS at 09:09

## 2021-09-25 RX ADMIN — ACETAMINOPHEN 1000 MG: 500 TABLET ORAL at 09:09

## 2021-09-25 RX ADMIN — BISACODYL 10 MG: 10 SUPPOSITORY RECTAL at 09:09

## 2021-09-26 LAB
ANION GAP SERPL CALCULATED.3IONS-SCNC: 10 MMOL/L (ref 7–16)
ANISOCYTOSIS BLD QL SMEAR: ABNORMAL
BASOPHILS # BLD AUTO: 0.02 K/UL (ref 0–0.2)
BASOPHILS NFR BLD AUTO: 0.2 % (ref 0–1)
BUN SERPL-MCNC: 25 MG/DL (ref 7–18)
BUN/CREAT SERPL: 24 (ref 6–20)
CALCIUM SERPL-MCNC: 9.7 MG/DL (ref 8.5–10.1)
CHLORIDE SERPL-SCNC: 105 MMOL/L (ref 98–107)
CO2 SERPL-SCNC: 28 MMOL/L (ref 21–32)
CREAT SERPL-MCNC: 1.03 MG/DL (ref 0.7–1.3)
DIFFERENTIAL METHOD BLD: ABNORMAL
EOSINOPHIL # BLD AUTO: 0.12 K/UL (ref 0–0.5)
EOSINOPHIL NFR BLD AUTO: 1.1 % (ref 1–4)
ERYTHROCYTE [DISTWIDTH] IN BLOOD BY AUTOMATED COUNT: 15.7 % (ref 11.5–14.5)
GLUCOSE SERPL-MCNC: 130 MG/DL (ref 74–106)
GLUCOSE SERPL-MCNC: 131 MG/DL (ref 70–105)
GLUCOSE SERPL-MCNC: 94 MG/DL (ref 70–105)
HCT VFR BLD AUTO: 28.4 % (ref 40–54)
HGB BLD-MCNC: 10 G/DL (ref 13.5–18)
HYPOCHROMIA BLD QL SMEAR: ABNORMAL
IMM GRANULOCYTES # BLD AUTO: 0.43 K/UL (ref 0–0.04)
IMM GRANULOCYTES NFR BLD: 4 % (ref 0–0.4)
LYMPHOCYTES # BLD AUTO: 1.23 K/UL (ref 1–4.8)
LYMPHOCYTES NFR BLD AUTO: 11.5 % (ref 27–41)
MCH RBC QN AUTO: 25.5 PG (ref 27–31)
MCHC RBC AUTO-ENTMCNC: 35.2 G/DL (ref 32–36)
MCV RBC AUTO: 72.4 FL (ref 80–96)
MICROCYTES BLD QL SMEAR: ABNORMAL
MONOCYTES # BLD AUTO: 0.57 K/UL (ref 0–0.8)
MONOCYTES NFR BLD AUTO: 5.3 % (ref 2–6)
MPC BLD CALC-MCNC: 9.3 FL (ref 9.4–12.4)
NEUTROPHILS # BLD AUTO: 8.29 K/UL (ref 1.8–7.7)
NEUTROPHILS NFR BLD AUTO: 77.9 % (ref 53–65)
NRBC # BLD AUTO: 0.02 X10E3/UL
NRBC, AUTO (.00): 0.2 %
OVALOCYTES BLD QL SMEAR: ABNORMAL
PLATELET # BLD AUTO: 368 K/UL (ref 150–400)
PLATELET MORPHOLOGY: ABNORMAL
POLYCHROMASIA BLD QL SMEAR: ABNORMAL
POTASSIUM SERPL-SCNC: 4.4 MMOL/L (ref 3.5–5.1)
RBC # BLD AUTO: 3.92 M/UL (ref 4.6–6.2)
SODIUM SERPL-SCNC: 139 MMOL/L (ref 136–145)
TARGETS BLD QL SMEAR: ABNORMAL
WBC # BLD AUTO: 10.66 K/UL (ref 4.5–11)

## 2021-09-26 PROCEDURE — 63600175 PHARM REV CODE 636 W HCPCS: Performed by: HOSPITALIST

## 2021-09-26 PROCEDURE — 25000003 PHARM REV CODE 250: Performed by: INTERNAL MEDICINE

## 2021-09-26 PROCEDURE — 25000003 PHARM REV CODE 250: Performed by: HOSPITALIST

## 2021-09-26 PROCEDURE — 82962 GLUCOSE BLOOD TEST: CPT

## 2021-09-26 PROCEDURE — 36415 COLL VENOUS BLD VENIPUNCTURE: CPT | Performed by: INTERNAL MEDICINE

## 2021-09-26 PROCEDURE — 80048 BASIC METABOLIC PNL TOTAL CA: CPT

## 2021-09-26 PROCEDURE — 25000003 PHARM REV CODE 250: Performed by: FAMILY MEDICINE

## 2021-09-26 PROCEDURE — 25000003 PHARM REV CODE 250

## 2021-09-26 PROCEDURE — 99232 SBSQ HOSP IP/OBS MODERATE 35: CPT | Mod: GC,,, | Performed by: INTERNAL MEDICINE

## 2021-09-26 PROCEDURE — 85025 COMPLETE CBC W/AUTO DIFF WBC: CPT

## 2021-09-26 PROCEDURE — 11000001 HC ACUTE MED/SURG PRIVATE ROOM

## 2021-09-26 PROCEDURE — 36415 COLL VENOUS BLD VENIPUNCTURE: CPT

## 2021-09-26 PROCEDURE — 87040 BLOOD CULTURE FOR BACTERIA: CPT | Performed by: INTERNAL MEDICINE

## 2021-09-26 PROCEDURE — 99232 PR SUBSEQUENT HOSPITAL CARE,LEVL II: ICD-10-PCS | Mod: GC,,, | Performed by: INTERNAL MEDICINE

## 2021-09-26 RX ORDER — SODIUM CHLORIDE 9 MG/ML
INJECTION, SOLUTION INTRAVENOUS CONTINUOUS
Status: DISCONTINUED | OUTPATIENT
Start: 2021-09-26 | End: 2021-09-27 | Stop reason: HOSPADM

## 2021-09-26 RX ADMIN — RISPERIDONE 0.5 MG: 0.5 TABLET ORAL at 09:09

## 2021-09-26 RX ADMIN — BISACODYL 10 MG: 10 SUPPOSITORY RECTAL at 09:09

## 2021-09-26 RX ADMIN — QUETIAPINE 300 MG: 200 TABLET ORAL at 09:09

## 2021-09-26 RX ADMIN — HEPARIN SODIUM 5000 UNITS: 5000 INJECTION INTRAVENOUS; SUBCUTANEOUS at 02:09

## 2021-09-26 RX ADMIN — HEPARIN SODIUM 5000 UNITS: 5000 INJECTION INTRAVENOUS; SUBCUTANEOUS at 09:09

## 2021-09-26 RX ADMIN — SODIUM CHLORIDE: 9 INJECTION, SOLUTION INTRAVENOUS at 09:09

## 2021-09-26 RX ADMIN — MEMANTINE 5 MG: 5 TABLET ORAL at 09:09

## 2021-09-26 RX ADMIN — DONEPEZIL HYDROCHLORIDE 10 MG: 5 TABLET ORAL at 09:09

## 2021-09-26 RX ADMIN — HEPARIN SODIUM 5000 UNITS: 5000 INJECTION INTRAVENOUS; SUBCUTANEOUS at 06:09

## 2021-09-27 VITALS
RESPIRATION RATE: 20 BRPM | WEIGHT: 168.63 LBS | HEART RATE: 71 BPM | TEMPERATURE: 98 F | SYSTOLIC BLOOD PRESSURE: 108 MMHG | DIASTOLIC BLOOD PRESSURE: 70 MMHG | BODY MASS INDEX: 21.64 KG/M2 | HEIGHT: 74 IN | OXYGEN SATURATION: 92 %

## 2021-09-27 PROBLEM — G93.41 ENCEPHALOPATHY, METABOLIC: Status: RESOLVED | Noted: 2021-09-14 | Resolved: 2021-09-27

## 2021-09-27 PROBLEM — E86.0 DEHYDRATION WITH HYPERNATREMIA: Status: RESOLVED | Noted: 2021-09-13 | Resolved: 2021-09-27

## 2021-09-27 PROBLEM — R79.89 ELEVATED LFTS: Status: RESOLVED | Noted: 2021-09-22 | Resolved: 2021-09-27

## 2021-09-27 PROBLEM — E87.0 DEHYDRATION WITH HYPERNATREMIA: Status: RESOLVED | Noted: 2021-09-13 | Resolved: 2021-09-27

## 2021-09-27 PROBLEM — R50.9 FEVER: Status: RESOLVED | Noted: 2021-09-24 | Resolved: 2021-09-27

## 2021-09-27 LAB
BACTERIA BLD CULT: NORMAL
BACTERIA BLD CULT: NORMAL
GLUCOSE SERPL-MCNC: 100 MG/DL (ref 70–105)
GLUCOSE SERPL-MCNC: 130 MG/DL (ref 70–105)

## 2021-09-27 PROCEDURE — 99239 PR HOSPITAL DISCHARGE DAY,>30 MIN: ICD-10-PCS | Mod: GC,,, | Performed by: FAMILY MEDICINE

## 2021-09-27 PROCEDURE — 82962 GLUCOSE BLOOD TEST: CPT

## 2021-09-27 PROCEDURE — 25000003 PHARM REV CODE 250

## 2021-09-27 PROCEDURE — 63600175 PHARM REV CODE 636 W HCPCS: Performed by: HOSPITALIST

## 2021-09-27 PROCEDURE — 99239 HOSP IP/OBS DSCHRG MGMT >30: CPT | Mod: GC,,, | Performed by: FAMILY MEDICINE

## 2021-09-27 PROCEDURE — 25000003 PHARM REV CODE 250: Performed by: HOSPITALIST

## 2021-09-27 PROCEDURE — 94761 N-INVAS EAR/PLS OXIMETRY MLT: CPT

## 2021-09-27 PROCEDURE — 25000003 PHARM REV CODE 250: Performed by: FAMILY MEDICINE

## 2021-09-27 RX ORDER — DONEPEZIL HYDROCHLORIDE 10 MG/1
10 TABLET, FILM COATED ORAL NIGHTLY
Qty: 30 TABLET | Refills: 11 | Status: SHIPPED | OUTPATIENT
Start: 2021-09-27 | End: 2022-09-27

## 2021-09-27 RX ADMIN — QUETIAPINE 300 MG: 200 TABLET ORAL at 09:09

## 2021-09-27 RX ADMIN — BISACODYL 10 MG: 10 SUPPOSITORY RECTAL at 09:09

## 2021-09-27 RX ADMIN — HEPARIN SODIUM 5000 UNITS: 5000 INJECTION INTRAVENOUS; SUBCUTANEOUS at 05:09

## 2021-09-27 RX ADMIN — MEMANTINE 5 MG: 5 TABLET ORAL at 09:09

## 2021-09-27 RX ADMIN — RISPERIDONE 0.5 MG: 0.5 TABLET ORAL at 09:09

## 2021-10-02 LAB
BACTERIA BLD CULT: NORMAL
BACTERIA BLD CULT: NORMAL

## 2021-11-09 ENCOUNTER — HOSPITAL ENCOUNTER (EMERGENCY)
Facility: HOSPITAL | Age: 61
Discharge: HOME | End: 2021-11-09
Payer: MEDICARE

## 2021-11-09 VITALS
SYSTOLIC BLOOD PRESSURE: 131 MMHG | WEIGHT: 170 LBS | TEMPERATURE: 98 F | HEIGHT: 72 IN | BODY MASS INDEX: 23.03 KG/M2 | HEART RATE: 76 BPM | OXYGEN SATURATION: 100 % | RESPIRATION RATE: 18 BRPM | DIASTOLIC BLOOD PRESSURE: 87 MMHG

## 2021-11-09 DIAGNOSIS — S09.90XA INJURY OF HEAD, INITIAL ENCOUNTER: Primary | ICD-10-CM

## 2021-11-09 DIAGNOSIS — S01.01XA SCALP LACERATION, INITIAL ENCOUNTER: ICD-10-CM

## 2021-11-09 PROCEDURE — 99282 EMERGENCY DEPT VISIT SF MDM: CPT | Mod: 25,GF | Performed by: NURSE PRACTITIONER

## 2021-11-09 PROCEDURE — 12001 RPR S/N/AX/GEN/TRNK 2.5CM/<: CPT | Mod: GF | Performed by: NURSE PRACTITIONER

## 2021-11-09 PROCEDURE — 99284 EMERGENCY DEPT VISIT MOD MDM: CPT | Mod: 25

## 2021-11-09 PROCEDURE — 25000003 PHARM REV CODE 250: Performed by: NURSE PRACTITIONER

## 2021-11-09 PROCEDURE — 12011 RPR F/E/E/N/L/M 2.5 CM/<: CPT

## 2021-11-09 RX ORDER — LIDOCAINE HYDROCHLORIDE 10 MG/ML
5 INJECTION INFILTRATION; PERINEURAL
Status: COMPLETED | OUTPATIENT
Start: 2021-11-09 | End: 2021-11-09

## 2021-11-09 RX ORDER — ASCORBIC ACID 500 MG
500 TABLET ORAL DAILY
Status: ON HOLD | COMMUNITY
End: 2022-02-07 | Stop reason: HOSPADM

## 2021-11-09 RX ORDER — METOPROLOL SUCCINATE 25 MG/1
25 TABLET, EXTENDED RELEASE ORAL DAILY
COMMUNITY

## 2021-11-09 RX ORDER — CYCLOBENZAPRINE HCL 10 MG
10 TABLET ORAL 2 TIMES DAILY PRN
COMMUNITY

## 2021-11-09 RX ORDER — ACETAMINOPHEN 500 MG
500 TABLET ORAL EVERY 4 HOURS PRN
COMMUNITY

## 2021-11-09 RX ORDER — OMEPRAZOLE 20 MG/1
20 CAPSULE, DELAYED RELEASE ORAL DAILY
COMMUNITY

## 2021-11-09 RX ORDER — BENZTROPINE MESYLATE 1 MG/1
0.5 TABLET ORAL 2 TIMES DAILY
COMMUNITY

## 2021-11-09 RX ADMIN — LIDOCAINE HYDROCHLORIDE 5 ML: 10 INJECTION, SOLUTION INFILTRATION; PERINEURAL at 02:11

## 2021-11-10 ENCOUNTER — HOSPITAL ENCOUNTER (EMERGENCY)
Facility: HOSPITAL | Age: 61
Discharge: HOME | End: 2021-11-10
Payer: MEDICARE

## 2021-11-10 VITALS
TEMPERATURE: 98 F | DIASTOLIC BLOOD PRESSURE: 78 MMHG | HEART RATE: 72 BPM | RESPIRATION RATE: 18 BRPM | SYSTOLIC BLOOD PRESSURE: 114 MMHG | BODY MASS INDEX: 27.2 KG/M2 | HEIGHT: 70 IN | OXYGEN SATURATION: 96 % | WEIGHT: 190 LBS
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
## 2021-11-10 DIAGNOSIS — S01.81XA LACERATION OF FOREHEAD, INITIAL ENCOUNTER: ICD-10-CM

## 2021-11-10 PROCEDURE — 99282 EMERGENCY DEPT VISIT SF MDM: CPT | Mod: 25,GF | Performed by: NURSE PRACTITIONER

## 2021-11-10 PROCEDURE — 12002 RPR S/N/AX/GEN/TRNK2.6-7.5CM: CPT | Mod: GF | Performed by: NURSE PRACTITIONER

## 2021-11-10 PROCEDURE — 25000003 PHARM REV CODE 250: Performed by: NURSE PRACTITIONER

## 2021-11-10 PROCEDURE — 99285 EMERGENCY DEPT VISIT HI MDM: CPT | Mod: 25

## 2021-11-10 PROCEDURE — 12051 INTMD RPR FACE/MM 2.5 CM/<: CPT

## 2021-11-10 RX ORDER — LIDOCAINE HYDROCHLORIDE 10 MG/ML
5 INJECTION, SOLUTION EPIDURAL; INFILTRATION; INTRACAUDAL; PERINEURAL
Status: COMPLETED | OUTPATIENT
Start: 2021-11-10 | End: 2021-11-10

## 2021-11-10 RX ADMIN — LIDOCAINE HYDROCHLORIDE 50 MG: 10 INJECTION, SOLUTION EPIDURAL; INFILTRATION; INTRACAUDAL; PERINEURAL at 08:11

## 2021-12-08 ENCOUNTER — LAB REQUISITION (OUTPATIENT)
Dept: LAB | Facility: HOSPITAL | Age: 61
End: 2021-12-08
Payer: MEDICARE

## 2021-12-08 DIAGNOSIS — E87.6 HYPOKALEMIA: ICD-10-CM

## 2021-12-08 LAB
ANION GAP SERPL CALCULATED.3IONS-SCNC: 38 MMOL/L (ref 7–16)
BUN SERPL-MCNC: 17 MG/DL (ref 7–18)
BUN/CREAT SERPL: 21 (ref 6–20)
CALCIUM SERPL-MCNC: 9.7 MG/DL (ref 8.5–10.1)
CHLORIDE SERPL-SCNC: 103 MMOL/L (ref 98–107)
CO2 SERPL-SCNC: 4 MMOL/L (ref 21–32)
CREAT SERPL-MCNC: 0.8 MG/DL (ref 0.7–1.3)
GLUCOSE SERPL-MCNC: 137 MG/DL (ref 74–106)
POTASSIUM SERPL-SCNC: 3.6 MMOL/L (ref 3.5–5.1)
SODIUM SERPL-SCNC: 141 MMOL/L (ref 136–145)

## 2021-12-08 PROCEDURE — 80048 BASIC METABOLIC PNL TOTAL CA: CPT | Performed by: FAMILY MEDICINE

## 2022-01-19 ENCOUNTER — LAB REQUISITION (OUTPATIENT)
Dept: LAB | Facility: HOSPITAL | Age: 62
End: 2022-01-19
Attending: FAMILY MEDICINE
Payer: MEDICARE

## 2022-01-19 DIAGNOSIS — Z51.81 ENCOUNTER FOR THERAPEUTIC DRUG LEVEL MONITORING: ICD-10-CM

## 2022-01-19 LAB — GENTAMICIN PEAK SERPL-MCNC: 5.2 ΜG/ML (ref 3–10)

## 2022-01-19 PROCEDURE — 80170 ASSAY OF GENTAMICIN: CPT | Performed by: FAMILY MEDICINE

## 2022-01-24 ENCOUNTER — LAB REQUISITION (OUTPATIENT)
Dept: LAB | Facility: HOSPITAL | Age: 62
End: 2022-01-24
Attending: FAMILY MEDICINE
Payer: MEDICARE

## 2022-01-24 DIAGNOSIS — A41.9 SEPSIS, UNSPECIFIED ORGANISM: ICD-10-CM

## 2022-01-24 LAB
ACANTHOCYTES BLD QL SMEAR: ABNORMAL
ALBUMIN SERPL BCP-MCNC: 2.5 G/DL (ref 3.5–5)
ALBUMIN/GLOB SERPL: 0.5 {RATIO}
ALP SERPL-CCNC: 129 U/L (ref 45–115)
ALT SERPL W P-5'-P-CCNC: 67 U/L (ref 16–61)
ANION GAP SERPL CALCULATED.3IONS-SCNC: 10 MMOL/L (ref 7–16)
ANISOCYTOSIS BLD QL SMEAR: ABNORMAL
AST SERPL W P-5'-P-CCNC: 34 U/L (ref 15–37)
BASOPHILS # BLD AUTO: 0.02 K/UL (ref 0–0.2)
BASOPHILS NFR BLD AUTO: 0.1 % (ref 0–1)
BILIRUB SERPL-MCNC: 0.3 MG/DL (ref 0–1.2)
BUN SERPL-MCNC: 24 MG/DL (ref 7–18)
BUN/CREAT SERPL: 27 (ref 6–20)
CALCIUM SERPL-MCNC: 11 MG/DL (ref 8.5–10.1)
CHLORIDE SERPL-SCNC: 104 MMOL/L (ref 98–107)
CO2 SERPL-SCNC: 32 MMOL/L (ref 21–32)
CREAT SERPL-MCNC: 0.89 MG/DL (ref 0.7–1.3)
DIFFERENTIAL METHOD BLD: ABNORMAL
EOSINOPHIL # BLD AUTO: 0.15 K/UL (ref 0–0.5)
EOSINOPHIL NFR BLD AUTO: 1 % (ref 1–4)
EOSINOPHIL NFR BLD MANUAL: 2 % (ref 1–4)
ERYTHROCYTE [DISTWIDTH] IN BLOOD BY AUTOMATED COUNT: 20.7 % (ref 11.5–14.5)
GLOBULIN SER-MCNC: 5.1 G/DL (ref 2–4)
GLUCOSE SERPL-MCNC: 118 MG/DL (ref 74–106)
HCT VFR BLD AUTO: 33.8 % (ref 40–54)
HGB BLD-MCNC: 11.3 G/DL (ref 13.5–18)
HYPOCHROMIA BLD QL SMEAR: ABNORMAL
LYMPHOCYTES # BLD AUTO: 2.39 K/UL (ref 1–4.8)
LYMPHOCYTES NFR BLD AUTO: 16.1 % (ref 27–41)
LYMPHOCYTES NFR BLD MANUAL: 16 % (ref 27–41)
MCH RBC QN AUTO: 25.1 PG (ref 27–31)
MCHC RBC AUTO-ENTMCNC: 33.4 G/DL (ref 32–36)
MCV RBC AUTO: 75.1 FL (ref 80–96)
MICROCYTES BLD QL SMEAR: ABNORMAL
MONOCYTES # BLD AUTO: 1.48 K/UL (ref 0–0.8)
MONOCYTES NFR BLD AUTO: 10 % (ref 2–6)
MONOCYTES NFR BLD MANUAL: 10 % (ref 2–6)
MPC BLD CALC-MCNC: 10.5 FL (ref 9.4–12.4)
NEUTROPHILS # BLD AUTO: 10.76 K/UL (ref 1.8–7.7)
NEUTROPHILS NFR BLD AUTO: 72.8 % (ref 53–65)
NEUTS BAND NFR BLD MANUAL: 8 % (ref 1–5)
NEUTS SEG NFR BLD MANUAL: 64 % (ref 50–62)
NRBC BLD MANUAL-RTO: ABNORMAL %
PLATELET # BLD AUTO: 507 K/UL (ref 150–400)
PLATELET MORPHOLOGY: ABNORMAL
POIKILOCYTOSIS BLD QL SMEAR: ABNORMAL
POTASSIUM SERPL-SCNC: 4.1 MMOL/L (ref 3.5–5.1)
PROT SERPL-MCNC: 7.6 G/DL (ref 6.4–8.2)
RBC # BLD AUTO: 4.5 M/UL (ref 4.6–6.2)
ROULEAUX BLD QL SMEAR: ABNORMAL
SODIUM SERPL-SCNC: 142 MMOL/L (ref 136–145)
WBC # BLD AUTO: 14.8 K/UL (ref 4.5–11)

## 2022-01-24 PROCEDURE — 85025 COMPLETE CBC W/AUTO DIFF WBC: CPT | Performed by: FAMILY MEDICINE

## 2022-01-24 PROCEDURE — 80053 COMPREHEN METABOLIC PANEL: CPT | Performed by: FAMILY MEDICINE

## 2022-01-26 ENCOUNTER — LAB REQUISITION (OUTPATIENT)
Dept: LAB | Facility: HOSPITAL | Age: 62
End: 2022-01-26
Attending: FAMILY MEDICINE
Payer: MEDICARE

## 2022-01-26 DIAGNOSIS — N39.0 URINARY TRACT INFECTION, SITE NOT SPECIFIED: ICD-10-CM

## 2022-01-26 LAB
BACTERIA #/AREA URNS HPF: ABNORMAL /HPF
BILIRUB UR QL STRIP: NEGATIVE
CLARITY UR: ABNORMAL
COLOR UR: YELLOW
GLUCOSE UR STRIP-MCNC: NEGATIVE MG/DL
KETONES UR STRIP-SCNC: NEGATIVE MG/DL
LEUKOCYTE ESTERASE UR QL STRIP: ABNORMAL
NITRITE UR QL STRIP: NEGATIVE
PH UR STRIP: 5.5 PH UNITS
PROT UR QL STRIP: 30
RBC # UR STRIP: ABNORMAL /UL
RBC #/AREA URNS HPF: ABNORMAL /HPF
SP GR UR STRIP: 1.01
SQUAMOUS #/AREA URNS LPF: ABNORMAL /LPF
UROBILINOGEN UR STRIP-ACNC: 0.2 MG/DL
WBC #/AREA URNS HPF: ABNORMAL /HPF
YEAST #/AREA URNS HPF: ABNORMAL /HPF

## 2022-01-26 PROCEDURE — 87086 URINE CULTURE/COLONY COUNT: CPT

## 2022-01-26 PROCEDURE — 81001 URINALYSIS AUTO W/SCOPE: CPT

## 2022-01-29 ENCOUNTER — HOSPITAL ENCOUNTER (INPATIENT)
Facility: HOSPITAL | Age: 62
LOS: 9 days | Discharge: NURSING, REHAB OR LONG-TERM CARE | DRG: 698 | End: 2022-02-07
Attending: EMERGENCY MEDICINE | Admitting: FAMILY MEDICINE
Payer: MEDICARE

## 2022-01-29 DIAGNOSIS — N39.0 CATHETER-ASSOCIATED URINARY TRACT INFECTION: ICD-10-CM

## 2022-01-29 DIAGNOSIS — J18.9 HEALTHCARE-ASSOCIATED PNEUMONIA: ICD-10-CM

## 2022-01-29 DIAGNOSIS — I10 PRIMARY HYPERTENSION: ICD-10-CM

## 2022-01-29 DIAGNOSIS — M19.90 OA (OSTEOARTHRITIS): ICD-10-CM

## 2022-01-29 DIAGNOSIS — E86.0 DEHYDRATION: ICD-10-CM

## 2022-01-29 DIAGNOSIS — K21.9 GERD (GASTROESOPHAGEAL REFLUX DISEASE): ICD-10-CM

## 2022-01-29 DIAGNOSIS — A41.9 SEPSIS: ICD-10-CM

## 2022-01-29 DIAGNOSIS — R13.10 DYSPHAGIA: ICD-10-CM

## 2022-01-29 DIAGNOSIS — E87.6 HYPOKALEMIA: ICD-10-CM

## 2022-01-29 DIAGNOSIS — T83.511A URINARY TRACT INFECTION ASSOCIATED WITH INDWELLING URETHRAL CATHETER, INITIAL ENCOUNTER: ICD-10-CM

## 2022-01-29 DIAGNOSIS — T83.511A CATHETER-ASSOCIATED URINARY TRACT INFECTION: ICD-10-CM

## 2022-01-29 DIAGNOSIS — F32.A DEPRESSION: ICD-10-CM

## 2022-01-29 DIAGNOSIS — F02.80 ALZHEIMER DISEASE: ICD-10-CM

## 2022-01-29 DIAGNOSIS — N39.0 URINARY TRACT INFECTION ASSOCIATED WITH INDWELLING URETHRAL CATHETER, INITIAL ENCOUNTER: ICD-10-CM

## 2022-01-29 DIAGNOSIS — R07.9 CHEST PAIN: ICD-10-CM

## 2022-01-29 DIAGNOSIS — R06.82 TACHYPNEA: ICD-10-CM

## 2022-01-29 DIAGNOSIS — R74.8 ELEVATED LIVER ENZYMES: ICD-10-CM

## 2022-01-29 DIAGNOSIS — G30.9 ALZHEIMER DISEASE: ICD-10-CM

## 2022-01-29 DIAGNOSIS — A41.9 SEPSIS, DUE TO UNSPECIFIED ORGANISM, UNSPECIFIED WHETHER ACUTE ORGAN DYSFUNCTION PRESENT: Primary | ICD-10-CM

## 2022-01-29 LAB
ALBUMIN SERPL BCP-MCNC: 2.2 G/DL (ref 3.5–5)
ALBUMIN/GLOB SERPL: 0.4 {RATIO}
ALP SERPL-CCNC: 112 U/L (ref 45–115)
ALT SERPL W P-5'-P-CCNC: 225 U/L (ref 16–61)
ANION GAP SERPL CALCULATED.3IONS-SCNC: 13 MMOL/L (ref 7–16)
ANISOCYTOSIS BLD QL SMEAR: ABNORMAL
AST SERPL W P-5'-P-CCNC: 152 U/L (ref 15–37)
BACTERIA #/AREA URNS HPF: ABNORMAL /HPF
BASOPHILS # BLD AUTO: 0.06 K/UL (ref 0–0.2)
BASOPHILS NFR BLD AUTO: 0.2 % (ref 0–1)
BILIRUB SERPL-MCNC: 0.5 MG/DL (ref 0–1.2)
BILIRUB UR QL STRIP: NEGATIVE
BUN SERPL-MCNC: 94 MG/DL (ref 7–18)
BUN/CREAT SERPL: 42 (ref 6–20)
CALCIUM SERPL-MCNC: 10.3 MG/DL (ref 8.5–10.1)
CHLORIDE SERPL-SCNC: 114 MMOL/L (ref 98–107)
CLARITY UR: CLEAR
CO2 SERPL-SCNC: 30 MMOL/L (ref 21–32)
COLOR UR: YELLOW
CREAT SERPL-MCNC: 2.22 MG/DL (ref 0.7–1.3)
DIFFERENTIAL METHOD BLD: ABNORMAL
EOSINOPHIL # BLD AUTO: 0 K/UL (ref 0–0.5)
EOSINOPHIL NFR BLD AUTO: 0 % (ref 1–4)
ERYTHROCYTE [DISTWIDTH] IN BLOOD BY AUTOMATED COUNT: 21.7 % (ref 11.5–14.5)
FINE GRAN CASTS #/AREA URNS LPF: ABNORMAL /LPF
FLUAV AG UPPER RESP QL IA.RAPID: NEGATIVE
FLUBV AG UPPER RESP QL IA.RAPID: NEGATIVE
GLOBULIN SER-MCNC: 5 G/DL (ref 2–4)
GLUCOSE SERPL-MCNC: 152 MG/DL (ref 70–105)
GLUCOSE SERPL-MCNC: 161 MG/DL (ref 74–106)
GLUCOSE UR STRIP-MCNC: NEGATIVE MG/DL
HCO3 UR-SCNC: 31.8 MMOL/L (ref 21–28)
HCT VFR BLD AUTO: 37.8 % (ref 40–54)
HCT VFR BLD CALC: 39 % (ref 35–51)
HGB BLD-MCNC: 12.7 G/DL (ref 13.5–18)
IMM GRANULOCYTES # BLD AUTO: 0.41 K/UL (ref 0–0.04)
IMM GRANULOCYTES NFR BLD: 1.2 % (ref 0–0.4)
KETONES UR STRIP-SCNC: ABNORMAL MG/DL
LACTATE SERPL-SCNC: 1.8 MMOL/L (ref 0.4–2)
LACTATE SERPL-SCNC: 2.1 MMOL/L (ref 0.4–2)
LDH SERPL L TO P-CCNC: 1.5 MMOL/L (ref 0.3–1.2)
LEUKOCYTE ESTERASE UR QL STRIP: ABNORMAL
LYMPHOCYTES # BLD AUTO: 2 K/UL (ref 1–4.8)
LYMPHOCYTES NFR BLD AUTO: 6.1 % (ref 27–41)
LYMPHOCYTES NFR BLD MANUAL: 8 % (ref 27–41)
MAGNESIUM SERPL-MCNC: 3.4 MG/DL (ref 1.7–2.3)
MCH RBC QN AUTO: 25.6 PG (ref 27–31)
MCHC RBC AUTO-ENTMCNC: 33.6 G/DL (ref 32–36)
MCV RBC AUTO: 76.1 FL (ref 80–96)
MONOCYTES # BLD AUTO: 2.56 K/UL (ref 0–0.8)
MONOCYTES NFR BLD AUTO: 7.8 % (ref 2–6)
MONOCYTES NFR BLD MANUAL: 6 % (ref 2–6)
MPC BLD CALC-MCNC: 12 FL (ref 9.4–12.4)
NEUTROPHILS # BLD AUTO: 27.86 K/UL (ref 1.8–7.7)
NEUTROPHILS NFR BLD AUTO: 84.7 % (ref 53–65)
NEUTS SEG NFR BLD MANUAL: 86 % (ref 50–62)
NITRITE UR QL STRIP: NEGATIVE
NRBC # BLD AUTO: 0.11 X10E3/UL
NRBC BLD MANUAL-RTO: 2 /100 WBC
NRBC, AUTO (.00): 0.3 %
NT-PROBNP SERPL-MCNC: 286 PG/ML (ref 1–125)
OVALOCYTES BLD QL SMEAR: ABNORMAL
PCO2 BLDA: 39 MMHG (ref 35–48)
PH SMN: 7.52 [PH] (ref 7.35–7.45)
PH UR STRIP: 5.5 PH UNITS
PLATELET # BLD AUTO: 495 K/UL (ref 150–400)
PLATELET MORPHOLOGY: ABNORMAL
PO2 BLDA: 65 MMHG (ref 83–108)
POC BASE EXCESS: 8.3 MMOL/L (ref -2–3)
POC CO2: 33 MMOL/L
POC IONIZED CALCIUM: 1.28 MMOL/L (ref 1.15–1.35)
POC SATURATED O2: 95 % (ref 95–98)
POCT GLUCOSE: 174 MG/DL (ref 60–95)
POLYCHROMASIA BLD QL SMEAR: ABNORMAL
POTASSIUM BLD-SCNC: 3.3 MMOL/L (ref 3.4–4.5)
POTASSIUM SERPL-SCNC: 4.3 MMOL/L (ref 3.5–5.1)
PROT SERPL-MCNC: 7.2 G/DL (ref 6.4–8.2)
PROT UR QL STRIP: 30
RBC # BLD AUTO: 4.97 M/UL (ref 4.6–6.2)
RBC # UR STRIP: ABNORMAL /UL
RBC #/AREA URNS HPF: ABNORMAL /HPF
SARS-COV+SARS-COV-2 AG RESP QL IA.RAPID: NEGATIVE
SODIUM BLD-SCNC: 150 MMOL/L (ref 136–145)
SODIUM SERPL-SCNC: 153 MMOL/L (ref 136–145)
SP GR UR STRIP: 1.02
SQUAMOUS #/AREA URNS LPF: ABNORMAL /LPF
TARGETS BLD QL SMEAR: ABNORMAL
TROPONIN I SERPL HS-MCNC: 41.7 PG/ML
UA COMPLETE W REFLEX CULTURE PNL UR: ABNORMAL
UROBILINOGEN UR STRIP-ACNC: 0.2 MG/DL
WBC # BLD AUTO: 32.89 K/UL (ref 4.5–11)
WBC #/AREA URNS HPF: ABNORMAL /HPF
YEAST #/AREA URNS HPF: ABNORMAL /HPF

## 2022-01-29 PROCEDURE — 25000003 PHARM REV CODE 250

## 2022-01-29 PROCEDURE — 80053 COMPREHEN METABOLIC PANEL: CPT | Performed by: EMERGENCY MEDICINE

## 2022-01-29 PROCEDURE — 96365 THER/PROPH/DIAG IV INF INIT: CPT

## 2022-01-29 PROCEDURE — 83605 ASSAY OF LACTIC ACID: CPT | Performed by: EMERGENCY MEDICINE

## 2022-01-29 PROCEDURE — 93010 EKG 12-LEAD: ICD-10-PCS | Mod: ,,, | Performed by: INTERNAL MEDICINE

## 2022-01-29 PROCEDURE — 36415 COLL VENOUS BLD VENIPUNCTURE: CPT | Performed by: EMERGENCY MEDICINE

## 2022-01-29 PROCEDURE — 81001 URINALYSIS AUTO W/SCOPE: CPT | Performed by: EMERGENCY MEDICINE

## 2022-01-29 PROCEDURE — 99223 1ST HOSP IP/OBS HIGH 75: CPT | Mod: GC,,, | Performed by: FAMILY MEDICINE

## 2022-01-29 PROCEDURE — 84484 ASSAY OF TROPONIN QUANT: CPT | Performed by: EMERGENCY MEDICINE

## 2022-01-29 PROCEDURE — 84295 ASSAY OF SERUM SODIUM: CPT

## 2022-01-29 PROCEDURE — 99285 EMERGENCY DEPT VISIT HI MDM: CPT | Mod: 25

## 2022-01-29 PROCEDURE — 96366 THER/PROPH/DIAG IV INF ADDON: CPT

## 2022-01-29 PROCEDURE — 85014 HEMATOCRIT: CPT

## 2022-01-29 PROCEDURE — 63600175 PHARM REV CODE 636 W HCPCS

## 2022-01-29 PROCEDURE — 25000242 PHARM REV CODE 250 ALT 637 W/ HCPCS

## 2022-01-29 PROCEDURE — 25000003 PHARM REV CODE 250: Performed by: EMERGENCY MEDICINE

## 2022-01-29 PROCEDURE — 99285 EMERGENCY DEPT VISIT HI MDM: CPT | Mod: ,,, | Performed by: EMERGENCY MEDICINE

## 2022-01-29 PROCEDURE — 99223 PR INITIAL HOSPITAL CARE,LEVL III: ICD-10-PCS | Mod: GC,,, | Performed by: FAMILY MEDICINE

## 2022-01-29 PROCEDURE — 83605 ASSAY OF LACTIC ACID: CPT

## 2022-01-29 PROCEDURE — 93005 ELECTROCARDIOGRAM TRACING: CPT

## 2022-01-29 PROCEDURE — 83735 ASSAY OF MAGNESIUM: CPT | Performed by: EMERGENCY MEDICINE

## 2022-01-29 PROCEDURE — 85025 COMPLETE CBC W/AUTO DIFF WBC: CPT | Performed by: EMERGENCY MEDICINE

## 2022-01-29 PROCEDURE — 93010 ELECTROCARDIOGRAM REPORT: CPT | Mod: ,,, | Performed by: INTERNAL MEDICINE

## 2022-01-29 PROCEDURE — S5010 5% DEXTROSE AND 0.45% SALINE: HCPCS

## 2022-01-29 PROCEDURE — 87428 SARSCOV & INF VIR A&B AG IA: CPT | Performed by: EMERGENCY MEDICINE

## 2022-01-29 PROCEDURE — C9113 INJ PANTOPRAZOLE SODIUM, VIA: HCPCS

## 2022-01-29 PROCEDURE — 96375 TX/PRO/DX INJ NEW DRUG ADDON: CPT

## 2022-01-29 PROCEDURE — 82947 ASSAY GLUCOSE BLOOD QUANT: CPT

## 2022-01-29 PROCEDURE — 87040 BLOOD CULTURE FOR BACTERIA: CPT | Performed by: EMERGENCY MEDICINE

## 2022-01-29 PROCEDURE — 87086 URINE CULTURE/COLONY COUNT: CPT | Performed by: EMERGENCY MEDICINE

## 2022-01-29 PROCEDURE — 11000001 HC ACUTE MED/SURG PRIVATE ROOM

## 2022-01-29 PROCEDURE — 83880 ASSAY OF NATRIURETIC PEPTIDE: CPT | Performed by: EMERGENCY MEDICINE

## 2022-01-29 PROCEDURE — 63600175 PHARM REV CODE 636 W HCPCS: Performed by: EMERGENCY MEDICINE

## 2022-01-29 PROCEDURE — 84132 ASSAY OF SERUM POTASSIUM: CPT

## 2022-01-29 PROCEDURE — 99285 PR EMERGENCY DEPT VISIT,LEVEL V: ICD-10-PCS | Mod: ,,, | Performed by: EMERGENCY MEDICINE

## 2022-01-29 PROCEDURE — 82962 GLUCOSE BLOOD TEST: CPT

## 2022-01-29 PROCEDURE — 82803 BLOOD GASES ANY COMBINATION: CPT

## 2022-01-29 PROCEDURE — 82330 ASSAY OF CALCIUM: CPT

## 2022-01-29 PROCEDURE — 96367 TX/PROPH/DG ADDL SEQ IV INF: CPT

## 2022-01-29 RX ORDER — ACETAMINOPHEN 325 MG/1
650 TABLET ORAL EVERY 4 HOURS PRN
Status: DISCONTINUED | OUTPATIENT
Start: 2022-01-29 | End: 2022-02-07 | Stop reason: HOSPADM

## 2022-01-29 RX ORDER — ONDANSETRON 2 MG/ML
4 INJECTION INTRAMUSCULAR; INTRAVENOUS EVERY 8 HOURS PRN
Status: DISCONTINUED | OUTPATIENT
Start: 2022-01-29 | End: 2022-02-07 | Stop reason: HOSPADM

## 2022-01-29 RX ORDER — PANTOPRAZOLE SODIUM 40 MG/10ML
40 INJECTION, POWDER, LYOPHILIZED, FOR SOLUTION INTRAVENOUS DAILY
Status: DISCONTINUED | OUTPATIENT
Start: 2022-01-29 | End: 2022-02-07 | Stop reason: HOSPADM

## 2022-01-29 RX ORDER — SODIUM CHLORIDE 0.9 % (FLUSH) 0.9 %
10 SYRINGE (ML) INJECTION EVERY 12 HOURS PRN
Status: DISCONTINUED | OUTPATIENT
Start: 2022-01-29 | End: 2022-02-07 | Stop reason: HOSPADM

## 2022-01-29 RX ORDER — GLUCAGON 1 MG
1 KIT INJECTION
Status: DISCONTINUED | OUTPATIENT
Start: 2022-01-29 | End: 2022-02-07 | Stop reason: HOSPADM

## 2022-01-29 RX ORDER — INSULIN ASPART 100 [IU]/ML
0-5 INJECTION, SOLUTION INTRAVENOUS; SUBCUTANEOUS
Status: DISCONTINUED | OUTPATIENT
Start: 2022-01-29 | End: 2022-02-07 | Stop reason: HOSPADM

## 2022-01-29 RX ORDER — NALOXONE HCL 0.4 MG/ML
0.02 VIAL (ML) INJECTION
Status: DISCONTINUED | OUTPATIENT
Start: 2022-01-29 | End: 2022-02-07 | Stop reason: HOSPADM

## 2022-01-29 RX ORDER — IPRATROPIUM BROMIDE AND ALBUTEROL SULFATE 2.5; .5 MG/3ML; MG/3ML
3 SOLUTION RESPIRATORY (INHALATION) EVERY 8 HOURS
Status: DISCONTINUED | OUTPATIENT
Start: 2022-01-29 | End: 2022-02-07 | Stop reason: HOSPADM

## 2022-01-29 RX ORDER — ASCORBIC ACID 500 MG
500 TABLET ORAL DAILY
Status: DISCONTINUED | OUTPATIENT
Start: 2022-01-29 | End: 2022-02-02

## 2022-01-29 RX ORDER — FOLIC ACID 1 MG/1
1 TABLET ORAL DAILY
COMMUNITY

## 2022-01-29 RX ORDER — METOPROLOL SUCCINATE 25 MG/1
25 TABLET, EXTENDED RELEASE ORAL DAILY
Status: DISCONTINUED | OUTPATIENT
Start: 2022-01-29 | End: 2022-01-29

## 2022-01-29 RX ORDER — FOLIC ACID 1 MG/1
1 TABLET ORAL DAILY
Status: DISCONTINUED | OUTPATIENT
Start: 2022-01-29 | End: 2022-02-07 | Stop reason: HOSPADM

## 2022-01-29 RX ORDER — BENZTROPINE MESYLATE 0.5 MG/1
0.5 TABLET ORAL 2 TIMES DAILY
Status: DISCONTINUED | OUTPATIENT
Start: 2022-01-29 | End: 2022-02-07 | Stop reason: HOSPADM

## 2022-01-29 RX ORDER — DEXTROSE MONOHYDRATE AND SODIUM CHLORIDE 5; .45 G/100ML; G/100ML
INJECTION, SOLUTION INTRAVENOUS CONTINUOUS
Status: DISCONTINUED | OUTPATIENT
Start: 2022-01-29 | End: 2022-02-01

## 2022-01-29 RX ORDER — POLYETHYLENE GLYCOL 3350 17 G/17G
17 POWDER, FOR SOLUTION ORAL DAILY
Status: DISCONTINUED | OUTPATIENT
Start: 2022-01-29 | End: 2022-02-07 | Stop reason: HOSPADM

## 2022-01-29 RX ORDER — DONEPEZIL HYDROCHLORIDE 5 MG/1
10 TABLET, FILM COATED ORAL NIGHTLY
Status: DISCONTINUED | OUTPATIENT
Start: 2022-01-29 | End: 2022-02-01

## 2022-01-29 RX ORDER — TRAZODONE HYDROCHLORIDE 50 MG/1
25 TABLET ORAL 3 TIMES DAILY
COMMUNITY

## 2022-01-29 RX ORDER — SODIUM CHLORIDE 9 MG/ML
INJECTION, SOLUTION INTRAVENOUS
Status: COMPLETED | OUTPATIENT
Start: 2022-01-29 | End: 2022-01-29

## 2022-01-29 RX ORDER — PANTOPRAZOLE SODIUM 40 MG/1
40 TABLET, DELAYED RELEASE ORAL DAILY
Status: DISCONTINUED | OUTPATIENT
Start: 2022-01-29 | End: 2022-01-29

## 2022-01-29 RX ADMIN — IPRATROPIUM BROMIDE AND ALBUTEROL SULFATE 3 ML: 2.5; .5 SOLUTION RESPIRATORY (INHALATION) at 05:01

## 2022-01-29 RX ADMIN — DONEPEZIL HYDROCHLORIDE 10 MG: 5 TABLET, FILM COATED ORAL at 09:01

## 2022-01-29 RX ADMIN — OXYCODONE HYDROCHLORIDE AND ACETAMINOPHEN 500 MG: 500 TABLET ORAL at 03:01

## 2022-01-29 RX ADMIN — VANCOMYCIN HYDROCHLORIDE 1250 MG: 5 INJECTION, POWDER, LYOPHILIZED, FOR SOLUTION INTRAVENOUS at 05:01

## 2022-01-29 RX ADMIN — PANTOPRAZOLE SODIUM 40 MG: 40 INJECTION, POWDER, FOR SOLUTION INTRAVENOUS at 05:01

## 2022-01-29 RX ADMIN — POLYETHYLENE GLYCOL 3350 17 G: 17 POWDER, FOR SOLUTION ORAL at 03:01

## 2022-01-29 RX ADMIN — TRAZODONE HYDROCHLORIDE 25 MG: 50 TABLET ORAL at 09:01

## 2022-01-29 RX ADMIN — BENZTROPINE MESYLATE 0.5 MG: 0.5 TABLET ORAL at 09:01

## 2022-01-29 RX ADMIN — DEXTROSE AND SODIUM CHLORIDE: 5; 450 INJECTION, SOLUTION INTRAVENOUS at 05:01

## 2022-01-29 RX ADMIN — CEFTRIAXONE SODIUM 2 G: 2 INJECTION, POWDER, FOR SOLUTION INTRAMUSCULAR; INTRAVENOUS at 12:01

## 2022-01-29 RX ADMIN — PIPERACILLIN AND TAZOBACTAM 4.5 G: 4; .5 INJECTION, POWDER, FOR SOLUTION INTRAVENOUS at 11:01

## 2022-01-29 RX ADMIN — SODIUM CHLORIDE: 9 INJECTION, SOLUTION INTRAVENOUS at 12:01

## 2022-01-29 RX ADMIN — PIPERACILLIN AND TAZOBACTAM 4.5 G: 4; .5 INJECTION, POWDER, FOR SOLUTION INTRAVENOUS at 03:01

## 2022-01-29 NOTE — HPI
Owen Solis is a 61 y.o.m. with a PMHx of HTN, Alzheimer's Dz, metabolic encephalopathy, OA, and dementia that presents to [facility] ED from [facility] for fever, tachypnea, HTN, hypoxia and AMS. In the ED UA was positive for UTI and CXR show bibasilar atelectasis/infiltrates. ED nurse stated patient came into ED with upton cath in place that contain an extensive amount of sediment. Patient was admitted to inpatient family medicine for further care and treatment. According to [facility] patient was ambulatory and could talk, although inappropriate wording, until he contracted COVID-19 in August 2021. Since then patient has been nonverbal and nonambulatory. Patient is fed through a PEG tube due to not being able to swallow. According to NH patient has had to be admitted to the hospital several times recently for PNA. NH also state patient is Full code as did patient's sister. Patient's sister was contacted via phone and she verified what the NH reported.

## 2022-01-29 NOTE — ASSESSMENT & PLAN NOTE
Patient came from [facility]  - Vancomycin  - Zosyn 4.5g Q8H  - Blood Cx x2 pending  - Supplemental O2   - telemetry   - Duo nebs Q8H

## 2022-01-29 NOTE — PROGRESS NOTES
Initial Pharmacy Consult    Consulted to assist in the management of Vancomycin therapy in this 60 y/o male with pneumonia.     Labs: BUN    94            SCR:   2.22            CRCL: 33    Based on the patient's weight of 68kg and the most recent lab data available, the following therapy will be initiated: Vancomycin 1250mg iv q36hr . Will check Vanc trough prior to the 3rd dose on 2/1 and make adjustments if necessary.  Will Continue to monitor.    PIETER Stark.Ph.

## 2022-01-29 NOTE — SUBJECTIVE & OBJECTIVE
Past Medical History:   Diagnosis Date    Alzheimer disease     Alzheimer's dementia     Arthritis     Hypertension     Metabolic encephalopathy        History reviewed. No pertinent surgical history.    Review of patient's allergies indicates:   Allergen Reactions    Amitriptyline        No current facility-administered medications on file prior to encounter.     Current Outpatient Medications on File Prior to Encounter   Medication Sig    ascorbic acid, vitamin C, (VITAMIN C) 500 MG tablet 500 mg by Per G Tube route once daily.    benztropine (COGENTIN) 1 MG tablet 0.5 mg by Per G Tube route 2 (two) times daily.    cyclobenzaprine (FLEXERIL) 10 MG tablet 10 mg by Per G Tube route 2 (two) times daily as needed for Muscle spasms.    donepeziL (ARICEPT) 10 MG tablet Take 1 tablet (10 mg total) by mouth every evening.    folic acid (FOLVITE) 1 MG tablet 1 mg by Per G Tube route once daily.    metoprolol succinate (TOPROL-XL) 25 MG 24 hr tablet Take 25 mg by mouth once daily.    omeprazole (PRILOSEC) 20 MG capsule 20 mg once daily.    zinc 50 mg Tab 50 mg by Per G Tube route once daily.    acetaminophen (TYLENOL) 500 MG tablet 500 mg by Per G Tube route every 4 (four) hours as needed for Pain. Give 500 mg via PEG-Tube every four hours as needed for fever    traZODone (DESYREL) 50 MG tablet Take 25 mg by mouth 3 (three) times daily.    [DISCONTINUED] ipratropium/albuterol sulfate (IPRATROPIUM-ALBUTEROL INHL) Inhale 0.5-2.5 mg into the lungs every 6 (six) hours as needed.    [DISCONTINUED] polyethylene glycol (GLYCOLAX) 17 gram/dose powder MIX 1 CAPFUL INTO A CAPFUL OF WATER DAILY AS NEEDED    [DISCONTINUED] QUEtiapine (SEROQUEL) 300 MG Tab Take by mouth.    [DISCONTINUED] risperiDONE (RISPERDAL) 0.5 MG Tab Take by mouth.     Family History     Problem Relation (Age of Onset)    COPD Father    Heart disease Mother    Hypertension Mother, Sister        Tobacco Use    Smoking status: Never Smoker     Smokeless tobacco: Never Used   Substance and Sexual Activity    Alcohol use: Not Currently    Drug use: Never    Sexual activity: Not Currently     Review of Systems   Unable to perform ROS: Patient nonverbal     Objective:     Vital Signs (Most Recent):  Temp: 97.7 °F (36.5 °C) (01/29/22 1053)  Pulse: (!) 115 (01/29/22 1438)  Resp: (!) 34 (01/29/22 1438)  BP: 99/72 (01/29/22 1438)  SpO2: 98 % (01/29/22 1438) Vital Signs (24h Range):  Temp:  [97.7 °F (36.5 °C)] 97.7 °F (36.5 °C)  Pulse:  [113-125] 115  Resp:  [30-37] 34  SpO2:  [98 %-99 %] 98 %  BP: ()/(70-74) 99/72     Weight: 68 kg (150 lb)  Body mass index is 21.52 kg/m².    Physical Exam  Vitals and nursing note reviewed.   Constitutional:       Appearance: He is not ill-appearing, toxic-appearing or diaphoretic.   HENT:      Head: Normocephalic and atraumatic.      Nose: Nose normal. No congestion or rhinorrhea.      Mouth/Throat:      Mouth: Mucous membranes are dry.      Pharynx: No oropharyngeal exudate.   Eyes:      General:         Right eye: Discharge present.         Left eye: Discharge present.     Pupils: Pupils are equal, round, and reactive to light.   Cardiovascular:      Rate and Rhythm: Normal rate and regular rhythm.      Pulses: Normal pulses.      Heart sounds: Normal heart sounds. No murmur heard.  No friction rub.   Pulmonary:      Effort: Pulmonary effort is normal. No respiratory distress.      Breath sounds: Normal breath sounds. No wheezing, rhonchi or rales.   Abdominal:      Tenderness: There is no abdominal tenderness. There is no guarding.   Musculoskeletal:      Right lower leg: No edema.      Left lower leg: No edema.   Skin:     General: Skin is warm.      Capillary Refill: Capillary refill takes less than 2 seconds.   Neurological:      Mental Status: He is alert.   Psychiatric:         Attention and Perception: He is inattentive.         Mood and Affect: Affect is blunt.         Speech: He is noncommunicative.          Behavior: Behavior normal.           CRANIAL NERVES     CN III, IV, VI   Pupils are equal, round, and reactive to light.       Significant Labs:   Recent Lab Results       01/29/22  1449   01/29/22  1100   01/29/22  1051   01/29/22  1044        Influenza B, Molecular       Negative       Albumin/Globulin Ratio   0.4           Albumin   2.2           Alkaline Phosphatase   112           ALT   225           Anion Gap   13           Aniso   2+           Appearance, UA     Clear         AST   152           Bacteria, UA     Loaded         Baso #   0.06           Basophil %   0.2           Bilirubin (UA)     Negative         BILIRUBIN TOTAL   0.5           BUN   94           BUN/CREAT RATIO   42           Calcium   10.3           Chloride   114           CO2   30           Color, UA     Yellow         COVID-19 Ag       Negative       Creatinine   2.22           Differential Type   Manual           eGFR if non    32           Eos #   0.00           Eosinophil %   0.0           Fine Granular Casts, UA     0-2         Globulin, Total   5.0           Glucose   161           Glucose, UA     Negative         Hematocrit   37.8           Hemoglobin   12.7           Immature Grans (Abs)   0.41           Immature Granulocytes   1.2           Influenza A       Negative       Ketones, UA     Trace         Lactate, Roberto   2.1  Comment: A repeat order for Lactic Acid has been placed for collection in 2 hours.           Leukocytes, UA     Large         Lymph #   2.00           Lymph %   6.1              8           Magnesium   3.4           MCH   25.6           MCHC   33.6           MCV   76.1           Mono #   2.56           Mono %   7.8              6           MPV   12.0           Neutrophils, Abs   27.86           Neutrophils Relative   84.7           NITRITE UA     Negative         nRBC   2              0.3           NT-proBNP   286           NUCLEATED RBC ABSOLUTE   0.11           Occult Blood UA     Moderate          Ovalocytes   Few           pH, UA     5.5         PLATELET MORPHOLOGY   Large & Giant Platelets  Comment: INCREASED           Platelets   495           POC Glucose 152             Poly   Few           Potassium   4.3           PROTEIN TOTAL   7.2           Protein, UA     30          RBC   4.97           RBC, UA     3-5         RDW   21.7           Segmented Neutrophils, Man %   86           Sodium   153           Specific Gravity, UA     1.020         Squam Epithel, UA     Rare         Target Cells   Few           Troponin I High Sensitivity   41.7           UROBILINOGEN UA     0.2         WBC, UA     Too Numerous To Count         WBC   32.89           Yeast, UA     Many               Significant Imaging: I have reviewed all pertinent imaging results/findings within the past 24 hours.

## 2022-01-29 NOTE — ED TRIAGE NOTES
Presents to ED via EMS from [facility] for reported fever and tachypnea. EMS reports hypotension in route, improved with IVF. Patient nonverbal.

## 2022-01-29 NOTE — ED PROVIDER NOTES
Encounter Date: 1/29/2022    SCRIBE #1 NOTE: I, Eugenia Bianca, am scribing for, and in the presence of,  Suman Garcia. I have scribed the entire note.       History     Chief Complaint   Patient presents with    Fever    Hyperventilating     The patient is a 61 year old male brought to the ED for fever and tachypnea. [facility] reports he also had a low room O2 stat and was hypotensive as well. His blood pressure regulated after he received fluids. They state this is not his normal behavior and is not a typical issue. The patient is nonverbal limiting ROS and history.    The history is provided by the custodial. The history is limited by a developmental delay. No  was used.     Review of patient's allergies indicates:   Allergen Reactions    Amitriptyline      Past Medical History:   Diagnosis Date    Alzheimer disease     Alzheimer's dementia     Arthritis     Hypertension     Metabolic encephalopathy      History reviewed. No pertinent surgical history.  Family History   Family history unknown: Yes     Social History     Tobacco Use    Smoking status: Never Smoker    Smokeless tobacco: Never Used   Substance Use Topics    Alcohol use: Not Currently    Drug use: Never     Review of Systems   Unable to perform ROS: Patient nonverbal   Constitutional: Positive for fever.        Hypotensive   Respiratory: Positive for shortness of breath.    All other systems reviewed and are negative.      Physical Exam     Initial Vitals [01/29/22 1053]   BP Pulse Resp Temp SpO2   96/70 (!) 125 (!) 30 97.7 °F (36.5 °C) 98 %      MAP       --         Physical Exam    Constitutional: He appears well-developed and well-nourished.   HENT:   Head: Normocephalic and atraumatic.   Eyes: EOM are normal. Pupils are equal, round, and reactive to light.   Neck: Neck supple.   Cardiovascular: Regular rhythm. Tachycardia present.    Pulmonary/Chest: Tachypnea noted. He has rales (on the right side).    Abdominal: Abdomen is soft. He exhibits no distension.   Musculoskeletal:      Cervical back: Neck supple.     Skin: Skin is warm and dry.   Psychiatric:   Patient is nonverbal         ED Course   Procedures  Labs Reviewed   NT-PRO NATRIURETIC PEPTIDE - Abnormal; Notable for the following components:       Result Value    ProBNP 286 (*)     All other components within normal limits   COMPREHENSIVE METABOLIC PANEL - Abnormal; Notable for the following components:    Sodium 153 (*)     Chloride 114 (*)     Glucose 161 (*)     BUN 94 (*)     Creatinine 2.22 (*)     BUN/Creatinine Ratio 42 (*)     Calcium 10.3 (*)     Albumin 2.2 (*)     Globulin 5.0 (*)      (*)      (*)     eGFR 32 (*)     All other components within normal limits   LACTIC ACID, PLASMA - Abnormal; Notable for the following components:    Lactic Acid 2.1 (*)     All other components within normal limits   MAGNESIUM - Abnormal; Notable for the following components:    Magnesium 3.4 (*)     All other components within normal limits   URINALYSIS, REFLEX TO URINE CULTURE - Abnormal; Notable for the following components:    Leukocytes, UA Large (*)     Protein, UA 30  (*)     Blood, UA Moderate (*)     All other components within normal limits   CBC WITH DIFFERENTIAL - Abnormal; Notable for the following components:    WBC 32.89 (*)     Hemoglobin 12.7 (*)     Hematocrit 37.8 (*)     MCV 76.1 (*)     MCH 25.6 (*)     RDW 21.7 (*)     Platelet Count 495 (*)     Neutrophils % 84.7 (*)     Lymphocytes % 6.1 (*)     Monocytes % 7.8 (*)     Eosinophils % 0.0 (*)     Immature Granulocytes % 1.2 (*)     nRBC, Auto 0.3 (*)     Neutrophils, Abs 27.86 (*)     Monocytes, Absolute 2.56 (*)     Immature Granulocytes, Absolute 0.41 (*)     nRBC, Absolute 0.11 (*)     All other components within normal limits   URINALYSIS, MICROSCOPIC - Abnormal; Notable for the following components:    WBC, UA Too Numerous To Count (*)     RBC, UA 3-5 (*)     Bacteria, UA  Loaded (*)     Yeast, UA Many (*)     Fine Granular Casts, UA 0-2 (*)     All other components within normal limits   MANUAL DIFFERENTIAL - Abnormal; Notable for the following components:    Segmented Neutrophils, Man % 86 (*)     Lymphocytes, Man % 8 (*)     nRBC, Manual 2 (*)     Platelet Morphology Large & Giant Platelets (*)     All other components within normal limits   SARS-COV2 (COVID) W/ FLU ANTIGEN - Normal    Narrative:     Negative SARS-CoV results should not be used as the sole basis for treatment or patient management decisions; negative results should be considered in the context of a patient's recent exposures, history and the presene of clinical signs and symptoms consistent with COVID-19.  Negative results should be treated as presumptive and confirmed by molecular assay, if necessary for patient management.   TROPONIN I - Normal   CULTURE, BLOOD   CULTURE, BLOOD   CULTURE, URINE   CBC W/ AUTO DIFFERENTIAL    Narrative:     The following orders were created for panel order CBC auto differential.  Procedure                               Abnormality         Status                     ---------                               -----------         ------                     CBC with Differential[[id number]]        Abnormal            Final result               Manual Differential[[id number]]          Abnormal            Final result                 Please view results for these tests on the individual orders.   LACTIC ACID, PLASMA          Imaging Results          X-Ray Chest AP Portable (Final result)  Result time 01/29/22 11:19:26    Final result by Aureliano Singh DO (01/29/22 11:19:26)                 Impression:      Bibasilar atelectasis/infiltrate.    Point of Service: [facility]      Electronically signed by: Aureliano Singh  Date:    01/29/2022  Time:    11:19             Narrative:    EXAMINATION:  XR CHEST AP PORTABLE    CLINICAL HISTORY:  Tachypnea, not elsewhere  classified    COMPARISON:  Chest x-ray September 26, 2021    TECHNIQUE:  Frontal view/views of the chest.    FINDINGS:  Heart size appears within normal limits.  Bibasilar atelectasis/infiltrate.  Visualized osseous and surrounding soft tissue structures appear grossly unchanged.                                 Medications   cefTRIAXone (ROCEPHIN) 2 g in dextrose 5 % in water (D5W) 5 % 50 mL IVPB (MB+) (2 g Intravenous New Bag 1/29/22 1242)   0.9%  NaCl infusion ( Intravenous New Bag 1/29/22 1244)     Medical Decision Making:   Other:   I have discussed this case with another health care provider.       <> Summary of the Discussion:  consulted  at 12:50 for admission to hospitalist.            Attending Attestation:           Physician Attestation for Scribe:  Physician Attestation Statement for Scribe #1: I, BLAISE ESTRADA, reviewed documentation, as scribed by LENA OCAMPO in my presence, and it is both accurate and complete.                      Clinical Impression:   Final diagnoses:  [R06.82] Tachypnea  [A41.9] Sepsis, due to unspecified organism, unspecified whether acute organ dysfunction present (Primary)  [T83.511A, N39.0] Urinary tract infection associated with indwelling urethral catheter, initial encounter  [E86.0] Dehydration          ED Disposition Condition    Admit               Blaise Estrada MD  01/29/22 8782

## 2022-01-29 NOTE — H&P
[facility] Emergency Department Emergency Department  [facility] Medicine  History & Physical    Patient Name: Owen Solis  MRN: [id number]  Patient Class: IP- Inpatient  Admission Date: 1/29/2022  Attending Physician: Ramu Rhoades MD   Primary Care Provider: Angelina Jaramillo II, MD         Patient information was obtained from nursing home, past medical records and ER records.     Subjective:     Principal Problem:Catheter-associated urinary tract infection    Chief Complaint:   Chief Complaint   Patient presents with    Fever    Hyperventilating        HPI: Owen Solis is a 61 y.o.m. with a PMHx of HTN, Alzheimer's Dz, metabolic encephalopathy, OA, and dementia that presents to [facility] ED from [facility] for fever, tachypnea, HTN, hypoxia and AMS. In the ED UA was positive for UTI and CXR show bibasilar atelectasis/infiltrates. ED nurse stated patient came into ED with upton cath in place that contain an extensive amount of sediment. Patient was admitted to inpatient family medicine for further care and treatment. According to [facility] patient was ambulatory and could talk, although inappropriate wording, until he contracted COVID-19 in August 2021. Since then patient has been nonverbal and nonambulatory. Patient is fed through a PEG tube due to not being able to swallow. According to NH patient has had to be admitted to the hospital several times recently for PNA. NH also state patient is Full code as did patient's sister. Patient's sister was contacted via phone and she verified what the NH reported.       Past Medical History:   Diagnosis Date    Alzheimer disease     Alzheimer's dementia     Arthritis     Hypertension     Metabolic encephalopathy        History reviewed. No pertinent surgical history.    Review of patient's allergies indicates:   Allergen Reactions    Amitriptyline        No current facility-administered medications on file prior to encounter.     Current Outpatient Medications on File Prior to  Encounter   Medication Sig    ascorbic acid, vitamin C, (VITAMIN C) 500 MG tablet 500 mg by Per G Tube route once daily.    benztropine (COGENTIN) 1 MG tablet 0.5 mg by Per G Tube route 2 (two) times daily.    cyclobenzaprine (FLEXERIL) 10 MG tablet 10 mg by Per G Tube route 2 (two) times daily as needed for Muscle spasms.    donepeziL (ARICEPT) 10 MG tablet Take 1 tablet (10 mg total) by mouth every evening.    folic acid (FOLVITE) 1 MG tablet 1 mg by Per G Tube route once daily.    metoprolol succinate (TOPROL-XL) 25 MG 24 hr tablet Take 25 mg by mouth once daily.    omeprazole (PRILOSEC) 20 MG capsule 20 mg once daily.    zinc 50 mg Tab 50 mg by Per G Tube route once daily.    acetaminophen (TYLENOL) 500 MG tablet 500 mg by Per G Tube route every 4 (four) hours as needed for Pain. Give 500 mg via PEG-Tube every four hours as needed for fever    traZODone (DESYREL) 50 MG tablet Take 25 mg by mouth 3 (three) times daily.    [DISCONTINUED] ipratropium/albuterol sulfate (IPRATROPIUM-ALBUTEROL INHL) Inhale 0.5-2.5 mg into the lungs every 6 (six) hours as needed.    [DISCONTINUED] polyethylene glycol (GLYCOLAX) 17 gram/dose powder MIX 1 CAPFUL INTO A CAPFUL OF WATER DAILY AS NEEDED    [DISCONTINUED] QUEtiapine (SEROQUEL) 300 MG Tab Take by mouth.    [DISCONTINUED] risperiDONE (RISPERDAL) 0.5 MG Tab Take by mouth.     Family History     Problem Relation (Age of Onset)    COPD Father    Heart disease Mother    Hypertension Mother, Sister        Tobacco Use    Smoking status: Never Smoker    Smokeless tobacco: Never Used   Substance and Sexual Activity    Alcohol use: Not Currently    Drug use: Never    Sexual activity: Not Currently     Review of Systems   Unable to perform ROS: Patient nonverbal     Objective:     Vital Signs (Most Recent):  Temp: 97.7 °F (36.5 °C) (01/29/22 1053)  Pulse: (!) 115 (01/29/22 1438)  Resp: (!) 34 (01/29/22 1438)  BP: 99/72 (01/29/22 1438)  SpO2: 98 % (01/29/22 1438) Vital  Signs (24h Range):  Temp:  [97.7 °F (36.5 °C)] 97.7 °F (36.5 °C)  Pulse:  [113-125] 115  Resp:  [30-37] 34  SpO2:  [98 %-99 %] 98 %  BP: ()/(70-74) 99/72     Weight: 68 kg (150 lb)  Body mass index is 21.52 kg/m².    Physical Exam  Vitals and nursing note reviewed.   Constitutional:       Appearance: He is not ill-appearing, toxic-appearing or diaphoretic.   HENT:      Head: Normocephalic and atraumatic.      Nose: Nose normal. No congestion or rhinorrhea.      Mouth/Throat:      Mouth: Mucous membranes are dry.      Pharynx: No oropharyngeal exudate.   Eyes:      General:         Right eye: Discharge present.         Left eye: Discharge present.     Pupils: Pupils are equal, round, and reactive to light.   Cardiovascular:      Rate and Rhythm: Normal rate and regular rhythm.      Pulses: Normal pulses.      Heart sounds: Normal heart sounds. No murmur heard.  No friction rub.   Pulmonary:      Effort: Pulmonary effort is normal. No respiratory distress.      Breath sounds: Normal breath sounds. No wheezing, rhonchi or rales.   Abdominal:      Tenderness: There is no abdominal tenderness. There is no guarding.   Musculoskeletal:      Right lower leg: No edema.      Left lower leg: No edema.   Skin:     General: Skin is warm.      Capillary Refill: Capillary refill takes less than 2 seconds.   Neurological:      Mental Status: He is alert.   Psychiatric:         Attention and Perception: He is inattentive.         Mood and Affect: Affect is blunt.         Speech: He is noncommunicative.         Behavior: Behavior normal.           CRANIAL NERVES     CN III, IV, VI   Pupils are equal, round, and reactive to light.       Significant Labs:   Recent Lab Results       01/29/22  1449   01/29/22  1100   01/29/22  1051   01/29/22  1044        Influenza B, Molecular       Negative       Albumin/Globulin Ratio   0.4           Albumin   2.2           Alkaline Phosphatase   112           ALT   225           Anion Gap    13           Aniso   2+           Appearance, UA     Clear         AST   152           Bacteria, UA     Loaded         Baso #   0.06           Basophil %   0.2           Bilirubin (UA)     Negative         BILIRUBIN TOTAL   0.5           BUN   94           BUN/CREAT RATIO   42           Calcium   10.3           Chloride   114           CO2   30           Color, UA     Yellow         COVID-19 Ag       Negative       Creatinine   2.22           Differential Type   Manual           eGFR if non    32           Eos #   0.00           Eosinophil %   0.0           Fine Granular Casts, UA     0-2         Globulin, Total   5.0           Glucose   161           Glucose, UA     Negative         Hematocrit   37.8           Hemoglobin   12.7           Immature Grans (Abs)   0.41           Immature Granulocytes   1.2           Influenza A       Negative       Ketones, UA     Trace         Lactate, Roberto   2.1  Comment: A repeat order for Lactic Acid has been placed for collection in 2 hours.           Leukocytes, UA     Large         Lymph #   2.00           Lymph %   6.1              8           Magnesium   3.4           MCH   25.6           MCHC   33.6           MCV   76.1           Mono #   2.56           Mono %   7.8              6           MPV   12.0           Neutrophils, Abs   27.86           Neutrophils Relative   84.7           NITRITE UA     Negative         nRBC   2              0.3           NT-proBNP   286           NUCLEATED RBC ABSOLUTE   0.11           Occult Blood UA     Moderate         Ovalocytes   Few           pH, UA     5.5         PLATELET MORPHOLOGY   Large & Giant Platelets  Comment: INCREASED           Platelets   495           POC Glucose 152             Poly   Few           Potassium   4.3           PROTEIN TOTAL   7.2           Protein, UA     30          RBC   4.97           RBC, UA     3-5         RDW   21.7           Segmented Neutrophils, Man %   86           Sodium   153            Specific Gravity, UA     1.020         Squam Epithel, UA     Rare         Target Cells   Few           Troponin I High Sensitivity   41.7           UROBILINOGEN UA     0.2         WBC, UA     Too Numerous To Count         WBC   32.89           Yeast, UA     Many               Significant Imaging: I have reviewed all pertinent imaging results/findings within the past 24 hours.    Assessment/Plan:     * Catheter-associated urinary tract infection  U/A  Positive for UTI.  - Rocephin 2g x1 dose in ED  - Urine Cx pending  - D5W 1/2 NS @ 75 mL/hr    Healthcare-associated pneumonia  Patient came from [facility]  - Vancomycin  - Zosyn 4.5g Q8H  - Blood Cx x2 pending  - Supplemental O2   - telemetry   - Duo nebs Q8H    Primary hypertension  Holding Metoprolol succinate 25mg QD due to HoTN      Alzheimer disease  - Donepezil 10mg QD  - Benztropine 0.5 BID      Elevated liver enzymes  On admission  and   - fluids and monitor CMP daily      GERD (gastroesophageal reflux disease)  Protonix 40mg IV QD      Depression  Trazodone 50mg BID        OA (osteoarthritis)  - Flexeril 10mg BID  - acetaminophen PRN     Possible Sepsis  QSOFA score of 3  ABG ordered for SOFA score  Latest Lactic acid wnl  Blood Cx pending   Started Abx and fluids  Will continue to monitor      Dysphagia  PEG tube placed       VTE Risk Mitigation (From admission, onward)         Ordered     IP VTE LOW RISK PATIENT  Once         01/29/22 1428     Place sequential compression device  Until discontinued         01/29/22 1428                   Hamlet Harris DO  Department of Hospital Medicine   [facility] - Emergency Department

## 2022-01-29 NOTE — ASSESSMENT & PLAN NOTE
QSOFA score of 3  ABG ordered for SOFA score  Latest Lactic acid wnl  Blood Cx pending   Started Abx and fluids  Will continue to monitor

## 2022-01-30 LAB
CULTURE, LOWER RESPIRATORY: NORMAL
GLUCOSE SERPL-MCNC: 166 MG/DL (ref 70–105)
GLUCOSE SERPL-MCNC: 172 MG/DL (ref 70–105)
GLUCOSE SERPL-MCNC: 173 MG/DL (ref 70–105)
GLUCOSE SERPL-MCNC: 181 MG/DL (ref 70–105)
GLUCOSE SERPL-MCNC: 218 MG/DL (ref 70–105)
GRAM STN SPEC: NORMAL

## 2022-01-30 PROCEDURE — 99232 SBSQ HOSP IP/OBS MODERATE 35: CPT | Mod: GC,,, | Performed by: FAMILY MEDICINE

## 2022-01-30 PROCEDURE — 87070 CULTURE OTHR SPECIMN AEROBIC: CPT | Performed by: FAMILY MEDICINE

## 2022-01-30 PROCEDURE — C9113 INJ PANTOPRAZOLE SODIUM, VIA: HCPCS

## 2022-01-30 PROCEDURE — 94640 AIRWAY INHALATION TREATMENT: CPT

## 2022-01-30 PROCEDURE — 25000003 PHARM REV CODE 250: Performed by: FAMILY MEDICINE

## 2022-01-30 PROCEDURE — S5010 5% DEXTROSE AND 0.45% SALINE: HCPCS

## 2022-01-30 PROCEDURE — 25000242 PHARM REV CODE 250 ALT 637 W/ HCPCS

## 2022-01-30 PROCEDURE — 11000001 HC ACUTE MED/SURG PRIVATE ROOM

## 2022-01-30 PROCEDURE — 99900031 HC PATIENT EDUCATION (STAT)

## 2022-01-30 PROCEDURE — 25000003 PHARM REV CODE 250

## 2022-01-30 PROCEDURE — 99232 PR SUBSEQUENT HOSPITAL CARE,LEVL II: ICD-10-PCS | Mod: GC,,, | Performed by: FAMILY MEDICINE

## 2022-01-30 PROCEDURE — 94761 N-INVAS EAR/PLS OXIMETRY MLT: CPT

## 2022-01-30 PROCEDURE — 82962 GLUCOSE BLOOD TEST: CPT

## 2022-01-30 PROCEDURE — 63600175 PHARM REV CODE 636 W HCPCS

## 2022-01-30 RX ORDER — NYSTATIN 100000 [USP'U]/ML
500000 SUSPENSION ORAL 3 TIMES DAILY
Status: DISCONTINUED | OUTPATIENT
Start: 2022-01-30 | End: 2022-02-02

## 2022-01-30 RX ADMIN — NYSTATIN 500000 UNITS: 500000 SUSPENSION ORAL at 03:01

## 2022-01-30 RX ADMIN — IPRATROPIUM BROMIDE AND ALBUTEROL SULFATE 3 ML: 2.5; .5 SOLUTION RESPIRATORY (INHALATION) at 12:01

## 2022-01-30 RX ADMIN — PIPERACILLIN AND TAZOBACTAM 4.5 G: 4; .5 INJECTION, POWDER, FOR SOLUTION INTRAVENOUS at 09:01

## 2022-01-30 RX ADMIN — INSULIN ASPART 2 UNITS: 100 INJECTION, SOLUTION INTRAVENOUS; SUBCUTANEOUS at 01:01

## 2022-01-30 RX ADMIN — BENZTROPINE MESYLATE 0.5 MG: 0.5 TABLET ORAL at 09:01

## 2022-01-30 RX ADMIN — OXYCODONE HYDROCHLORIDE AND ACETAMINOPHEN 500 MG: 500 TABLET ORAL at 09:01

## 2022-01-30 RX ADMIN — PIPERACILLIN AND TAZOBACTAM 4.5 G: 4; .5 INJECTION, POWDER, FOR SOLUTION INTRAVENOUS at 03:01

## 2022-01-30 RX ADMIN — IPRATROPIUM BROMIDE AND ALBUTEROL SULFATE 3 ML: 2.5; .5 SOLUTION RESPIRATORY (INHALATION) at 07:01

## 2022-01-30 RX ADMIN — FOLIC ACID 1 MG: 1 TABLET ORAL at 09:01

## 2022-01-30 RX ADMIN — PIPERACILLIN AND TAZOBACTAM 4.5 G: 4; .5 INJECTION, POWDER, FOR SOLUTION INTRAVENOUS at 11:01

## 2022-01-30 RX ADMIN — POLYETHYLENE GLYCOL 3350 17 G: 17 POWDER, FOR SOLUTION ORAL at 09:01

## 2022-01-30 RX ADMIN — DEXTROSE AND SODIUM CHLORIDE: 5; 450 INJECTION, SOLUTION INTRAVENOUS at 07:01

## 2022-01-30 RX ADMIN — TRAZODONE HYDROCHLORIDE 25 MG: 50 TABLET ORAL at 09:01

## 2022-01-30 RX ADMIN — TRAZODONE HYDROCHLORIDE 25 MG: 50 TABLET ORAL at 03:01

## 2022-01-30 RX ADMIN — ACETAMINOPHEN 650 MG: 325 TABLET ORAL at 09:01

## 2022-01-30 RX ADMIN — DONEPEZIL HYDROCHLORIDE 10 MG: 5 TABLET, FILM COATED ORAL at 09:01

## 2022-01-30 RX ADMIN — PANTOPRAZOLE SODIUM 40 MG: 40 INJECTION, POWDER, FOR SOLUTION INTRAVENOUS at 09:01

## 2022-01-30 RX ADMIN — NYSTATIN 500000 UNITS: 500000 SUSPENSION ORAL at 09:01

## 2022-01-30 NOTE — PLAN OF CARE
Problem: Infection  Goal: Absence of Infection Signs and Symptoms  Outcome: Ongoing, Progressing     Problem: Adult Inpatient Plan of Care  Goal: Plan of Care Review  Outcome: Ongoing, Progressing  Goal: Patient-Specific Goal (Individualized)  Outcome: Ongoing, Progressing  Goal: Absence of Hospital-Acquired Illness or Injury  Outcome: Ongoing, Progressing  Goal: Optimal Comfort and Wellbeing  Outcome: Ongoing, Progressing  Goal: Readiness for Transition of Care  Outcome: Ongoing, Progressing     Problem: Adjustment to Illness (Sepsis/Septic Shock)  Goal: Optimal Coping  Outcome: Ongoing, Progressing     Problem: Bleeding (Sepsis/Septic Shock)  Goal: Absence of Bleeding  Outcome: Ongoing, Progressing     Problem: Glycemic Control Impaired (Sepsis/Septic Shock)  Goal: Blood Glucose Level Within Desired Range  Outcome: Ongoing, Progressing     Problem: Infection Progression (Sepsis/Septic Shock)  Goal: Absence of Infection Signs and Symptoms  Outcome: Ongoing, Progressing     Problem: Nutrition Impaired (Sepsis/Septic Shock)  Goal: Optimal Nutrition Intake  Outcome: Ongoing, Progressing     Problem: Fluid Imbalance (Pneumonia)  Goal: Fluid Balance  Outcome: Ongoing, Progressing     Problem: Infection (Pneumonia)  Goal: Resolution of Infection Signs and Symptoms  Outcome: Ongoing, Progressing     Problem: Respiratory Compromise (Pneumonia)  Goal: Effective Oxygenation and Ventilation  Outcome: Ongoing, Progressing     Problem: Fall Injury Risk  Goal: Absence of Fall and Fall-Related Injury  Outcome: Ongoing, Progressing     Problem: Skin Injury Risk Increased  Goal: Skin Health and Integrity  Outcome: Ongoing, Progressing   POC reviewed

## 2022-01-30 NOTE — PROGRESS NOTES
[address] Medicine  Progress Note    Patient Name: Owen Solis  MRN: [id number]  Patient Class: IP- Inpatient   Admission Date: 1/29/2022  Length of Stay: 1 days  Attending Physician: Ramu Rhoades MD  Primary Care Provider: Angelina Jaramillo II, MD        Subjective:     Principal Problem:Catheter-associated urinary tract infection        HPI:  Owen Solis is a 61 y.o.m. with a PMHx of HTN, Alzheimer's Dz, metabolic encephalopathy, OA, and dementia that presents to [facility] ED from [facility] for fever, tachypnea, HTN, hypoxia and AMS. In the ED UA was positive for UTI and CXR show bibasilar atelectasis/infiltrates. ED nurse stated patient came into ED with upton cath in place that contain an extensive amount of sediment. Patient was admitted to inpatient family medicine for further care and treatment. According to [facility] patient was ambulatory and could talk, although inappropriate wording, until he contracted COVID-19 in August 2021. Since then patient has been nonverbal and nonambulatory. Patient is fed through a PEG tube due to not being able to swallow. According to NH patient has had to be admitted to the hospital several times recently for PNA. NH also state patient is Full code as did patient's sister. Patient's sister was contacted via phone and she verified what the NH reported.       Overview/Hospital Course:  No notes on file    Interval History: Patient seen resting in bed this morning with no family at bedside. He is nonverbal. Somewhat tachypnic and tachycardic on exam. Continuing treatment of UTI and HCAP with Vancomycin and Zosyn. Blood and urine cultures pending.    Review of Systems   Unable to perform ROS: Patient nonverbal     Objective:     Vital Signs (Most Recent):  Temp: 98.3 °F (36.8 °C) (01/30/22 0715)  Pulse: (!) 115 (01/30/22 0747)  Resp: 20 (01/30/22 0747)  BP: 103/73 (01/30/22 0715)  SpO2: 98 % (01/30/22 0747) Vital Signs (24h  Range):  Temp:  [97.7 °F (36.5 °C)-99.8 °F (37.7 °C)] 98.3 °F (36.8 °C)  Pulse:  [110-127] 115  Resp:  [18-37] 20  SpO2:  [92 %-99 %] 98 %  BP: ()/(62-74) 103/73     Weight: 68 kg (150 lb)  Body mass index is 22.81 kg/m².    Intake/Output Summary (Last 24 hours) at 1/30/2022 0843  Last data filed at 1/30/2022 0713  Gross per 24 hour   Intake --   Output 800 ml   Net -800 ml      Physical Exam  Vitals and nursing note reviewed.   Constitutional:       Appearance: He is not ill-appearing, toxic-appearing or diaphoretic.   HENT:      Head: Normocephalic and atraumatic.      Nose: Nose normal. No congestion or rhinorrhea.      Mouth/Throat:      Mouth: Mucous membranes are dry.      Pharynx: No oropharyngeal exudate.   Eyes:      Pupils: Pupils are equal, round, and reactive to light.   Cardiovascular:      Rate and Rhythm: Normal rate and regular rhythm.      Pulses: Normal pulses.      Heart sounds: Normal heart sounds. No murmur heard.  No friction rub.   Pulmonary:      Effort: Pulmonary effort is normal. No respiratory distress.      Breath sounds: Normal breath sounds. No wheezing, rhonchi or rales.   Abdominal:      Tenderness: There is no abdominal tenderness. There is no guarding.   Musculoskeletal:      Right lower leg: No edema.      Left lower leg: No edema.   Skin:     General: Skin is warm.      Capillary Refill: Capillary refill takes less than 2 seconds.   Neurological:      Mental Status: He is alert.   Psychiatric:         Attention and Perception: He is inattentive.         Mood and Affect: Affect is blunt.         Speech: He is noncommunicative.         Behavior: Behavior normal.         Significant Labs: All pertinent labs within the past 24 hours have been reviewed.    Significant Imaging: I have reviewed all pertinent imaging results/findings within the past 24 hours.      Assessment/Plan:      * Catheter-associated urinary tract infection  U/A  Positive for UTI.  - Rocephin 2g x1 dose in  ED  - patient is now on Vancomycin and Zosyn  - Urine Cx pending    Elevated liver enzymes  On admission  and   - fluids and monitor CMP daily      GERD (gastroesophageal reflux disease)  Protonix 40mg IV QD      Depression  Trazodone 50mg BID        Primary hypertension  Holding Metoprolol succinate 25mg QD due to HoTN      OA (osteoarthritis)  - Flexeril 10mg BID  - acetaminophen PRN     Healthcare-associated pneumonia  Patient came from [facility]  - Vancomycin  - Zosyn 4.5g Q8H  - Blood Cx x2 pending  - Supplemental O2   - telemetry   - Duo nebs Q8H    Possible Sepsis  QSOFA score of 3  ABG ordered for SOFA score  Latest Lactic acid wnl  Blood Cx pending   Started Abx and fluids  Will continue to monitor      Dysphagia  PEG tube placed     Alzheimer disease  - Donepezil 10mg QD  - Benztropine 0.5 BID        VTE Risk Mitigation (From admission, onward)         Ordered     IP VTE LOW RISK PATIENT  Once         01/29/22 1428     Place sequential compression device  Until discontinued         01/29/22 1428                Discharge Planning   ESTEFANY:      Code Status: Full Code   Is the patient medically ready for discharge?:     Reason for patient still in hospital (select all that apply): Treatment                     Raymond Torres MD  Department of Hospital Medicine   [facility] - [address] [place] [address]

## 2022-01-30 NOTE — CARE UPDATE
Asked to come to patient's room by nurse. Patient had what appeared to be a large piece of chicken or turkey fat removed from his throat by the nurse. He also has very poor oral hygiene. We will send the material for culture and start him on Nystatin with oral care each shift.

## 2022-01-30 NOTE — ASSESSMENT & PLAN NOTE
U/A  Positive for UTI.  - Rocephin 2g x1 dose in ED  - patient is now on Vancomycin and Zosyn  - Urine Cx pending

## 2022-01-30 NOTE — ASSESSMENT & PLAN NOTE
Patient came from [facility]  - Vancomycin  - Zosyn 4.5g Q8H  - Blood Cx x2 pending  - Supplemental O2   - telemetry   - Duo nebs Q8H

## 2022-01-30 NOTE — SUBJECTIVE & OBJECTIVE
Interval History: Patient seen resting in bed this morning with no family at bedside. He is nonverbal. Somewhat tachypnic and tachycardic on exam. Continuing treatment of UTI and HCAP with Vancomycin and Zosyn. Blood and urine cultures pending.    Review of Systems   Unable to perform ROS: Patient nonverbal     Objective:     Vital Signs (Most Recent):  Temp: 98.3 °F (36.8 °C) (01/30/22 0715)  Pulse: (!) 115 (01/30/22 0747)  Resp: 20 (01/30/22 0747)  BP: 103/73 (01/30/22 0715)  SpO2: 98 % (01/30/22 0747) Vital Signs (24h Range):  Temp:  [97.7 °F (36.5 °C)-99.8 °F (37.7 °C)] 98.3 °F (36.8 °C)  Pulse:  [110-127] 115  Resp:  [18-37] 20  SpO2:  [92 %-99 %] 98 %  BP: ()/(62-74) 103/73     Weight: 68 kg (150 lb)  Body mass index is 22.81 kg/m².    Intake/Output Summary (Last 24 hours) at 1/30/2022 0843  Last data filed at 1/30/2022 0713  Gross per 24 hour   Intake --   Output 800 ml   Net -800 ml      Physical Exam  Vitals and nursing note reviewed.   Constitutional:       Appearance: He is not ill-appearing, toxic-appearing or diaphoretic.   HENT:      Head: Normocephalic and atraumatic.      Nose: Nose normal. No congestion or rhinorrhea.      Mouth/Throat:      Mouth: Mucous membranes are dry.      Pharynx: No oropharyngeal exudate.   Eyes:      Pupils: Pupils are equal, round, and reactive to light.   Cardiovascular:      Rate and Rhythm: Normal rate and regular rhythm.      Pulses: Normal pulses.      Heart sounds: Normal heart sounds. No murmur heard.  No friction rub.   Pulmonary:      Effort: Pulmonary effort is normal. No respiratory distress.      Breath sounds: Normal breath sounds. No wheezing, rhonchi or rales.   Abdominal:      Tenderness: There is no abdominal tenderness. There is no guarding.   Musculoskeletal:      Right lower leg: No edema.      Left lower leg: No edema.   Skin:     General: Skin is warm.      Capillary Refill: Capillary refill takes less than 2 seconds.   Neurological:      Mental  Status: He is alert.   Psychiatric:         Attention and Perception: He is inattentive.         Mood and Affect: Affect is blunt.         Speech: He is noncommunicative.         Behavior: Behavior normal.         Significant Labs: All pertinent labs within the past 24 hours have been reviewed.    Significant Imaging: I have reviewed all pertinent imaging results/findings within the past 24 hours.

## 2022-01-31 PROBLEM — N39.0 CATHETER-ASSOCIATED URINARY TRACT INFECTION: Status: RESOLVED | Noted: 2022-01-29 | Resolved: 2022-01-31

## 2022-01-31 PROBLEM — T83.511A CATHETER-ASSOCIATED URINARY TRACT INFECTION: Status: RESOLVED | Noted: 2022-01-29 | Resolved: 2022-01-31

## 2022-01-31 PROBLEM — A41.9 SEPSIS: Status: RESOLVED | Noted: 2022-01-29 | Resolved: 2022-01-31

## 2022-01-31 LAB
ANION GAP SERPL CALCULATED.3IONS-SCNC: 17 MMOL/L (ref 7–16)
ANISOCYTOSIS BLD QL SMEAR: ABNORMAL
BASOPHILS # BLD AUTO: 0.05 K/UL (ref 0–0.2)
BASOPHILS NFR BLD AUTO: 0.2 % (ref 0–1)
BUN SERPL-MCNC: 107 MG/DL (ref 7–18)
BUN/CREAT SERPL: 39 (ref 6–20)
CALCIUM SERPL-MCNC: 10.3 MG/DL (ref 8.5–10.1)
CHLORIDE SERPL-SCNC: 116 MMOL/L (ref 98–107)
CO2 SERPL-SCNC: 25 MMOL/L (ref 21–32)
CREAT SERPL-MCNC: 2.75 MG/DL (ref 0.7–1.3)
DIFFERENTIAL METHOD BLD: ABNORMAL
EOSINOPHIL # BLD AUTO: 0.01 K/UL (ref 0–0.5)
EOSINOPHIL NFR BLD AUTO: 0 % (ref 1–4)
ERYTHROCYTE [DISTWIDTH] IN BLOOD BY AUTOMATED COUNT: 21.5 % (ref 11.5–14.5)
GLUCOSE SERPL-MCNC: 175 MG/DL (ref 70–105)
GLUCOSE SERPL-MCNC: 185 MG/DL (ref 70–105)
GLUCOSE SERPL-MCNC: 189 MG/DL (ref 70–105)
GLUCOSE SERPL-MCNC: 196 MG/DL (ref 70–105)
GLUCOSE SERPL-MCNC: 196 MG/DL (ref 74–106)
HCT VFR BLD AUTO: 31 % (ref 40–54)
HGB BLD-MCNC: 10.3 G/DL (ref 13.5–18)
HYPOCHROMIA BLD QL SMEAR: ABNORMAL
IMM GRANULOCYTES # BLD AUTO: 0.59 K/UL (ref 0–0.04)
IMM GRANULOCYTES NFR BLD: 1.8 % (ref 0–0.4)
LYMPHOCYTES # BLD AUTO: 1.39 K/UL (ref 1–4.8)
LYMPHOCYTES NFR BLD AUTO: 4.3 % (ref 27–41)
LYMPHOCYTES NFR BLD MANUAL: 8 % (ref 27–41)
MCH RBC QN AUTO: 25.5 PG (ref 27–31)
MCHC RBC AUTO-ENTMCNC: 33.2 G/DL (ref 32–36)
MCV RBC AUTO: 76.7 FL (ref 80–96)
MONOCYTES # BLD AUTO: 1.81 K/UL (ref 0–0.8)
MONOCYTES NFR BLD AUTO: 5.6 % (ref 2–6)
MONOCYTES NFR BLD MANUAL: 7 % (ref 2–6)
MPC BLD CALC-MCNC: ABNORMAL G/DL
NEUTROPHILS # BLD AUTO: 28.55 K/UL (ref 1.8–7.7)
NEUTROPHILS NFR BLD AUTO: 88.1 % (ref 53–65)
NEUTS BAND NFR BLD MANUAL: 4 % (ref 1–5)
NEUTS SEG NFR BLD MANUAL: 81 % (ref 50–62)
NRBC # BLD AUTO: 0.09 X10E3/UL
NRBC BLD MANUAL-RTO: 3 /100 WBC
NRBC, AUTO (.00): 0.3 %
OVALOCYTES BLD QL SMEAR: ABNORMAL
PLATELET # BLD AUTO: 333 K/UL (ref 150–400)
PLATELET MORPHOLOGY: ABNORMAL
POLYCHROMASIA BLD QL SMEAR: ABNORMAL
POTASSIUM SERPL-SCNC: 3.2 MMOL/L (ref 3.5–5.1)
RBC # BLD AUTO: 4.04 M/UL (ref 4.6–6.2)
SODIUM SERPL-SCNC: 155 MMOL/L (ref 136–145)
TARGETS BLD QL SMEAR: ABNORMAL
UA COMPLETE W REFLEX CULTURE PNL UR: ABNORMAL
WBC # BLD AUTO: 32.4 K/UL (ref 4.5–11)

## 2022-01-31 PROCEDURE — 30200315 PPD INTRADERMAL TEST REV CODE 302

## 2022-01-31 PROCEDURE — 63600175 PHARM REV CODE 636 W HCPCS

## 2022-01-31 PROCEDURE — 82962 GLUCOSE BLOOD TEST: CPT

## 2022-01-31 PROCEDURE — C9113 INJ PANTOPRAZOLE SODIUM, VIA: HCPCS

## 2022-01-31 PROCEDURE — 94640 AIRWAY INHALATION TREATMENT: CPT

## 2022-01-31 PROCEDURE — 85025 COMPLETE CBC W/AUTO DIFF WBC: CPT | Performed by: FAMILY MEDICINE

## 2022-01-31 PROCEDURE — 25000003 PHARM REV CODE 250

## 2022-01-31 PROCEDURE — 11000001 HC ACUTE MED/SURG PRIVATE ROOM

## 2022-01-31 PROCEDURE — 80048 BASIC METABOLIC PNL TOTAL CA: CPT | Performed by: FAMILY MEDICINE

## 2022-01-31 PROCEDURE — 36415 COLL VENOUS BLD VENIPUNCTURE: CPT | Performed by: FAMILY MEDICINE

## 2022-01-31 PROCEDURE — 25000003 PHARM REV CODE 250: Performed by: FAMILY MEDICINE

## 2022-01-31 PROCEDURE — 86580 TB INTRADERMAL TEST: CPT

## 2022-01-31 PROCEDURE — S5010 5% DEXTROSE AND 0.45% SALINE: HCPCS

## 2022-01-31 PROCEDURE — 99232 SBSQ HOSP IP/OBS MODERATE 35: CPT | Mod: GC,,, | Performed by: INTERNAL MEDICINE

## 2022-01-31 PROCEDURE — 25000242 PHARM REV CODE 250 ALT 637 W/ HCPCS

## 2022-01-31 PROCEDURE — 27000221 HC OXYGEN, UP TO 24 HOURS

## 2022-01-31 PROCEDURE — 99232 PR SUBSEQUENT HOSPITAL CARE,LEVL II: ICD-10-PCS | Mod: GC,,, | Performed by: INTERNAL MEDICINE

## 2022-01-31 PROCEDURE — 94761 N-INVAS EAR/PLS OXIMETRY MLT: CPT

## 2022-01-31 RX ADMIN — BENZTROPINE MESYLATE 0.5 MG: 0.5 TABLET ORAL at 08:01

## 2022-01-31 RX ADMIN — DEXTROSE AND SODIUM CHLORIDE: 5; 450 INJECTION, SOLUTION INTRAVENOUS at 03:01

## 2022-01-31 RX ADMIN — TUBERCULIN PURIFIED PROTEIN DERIVATIVE 5 UNITS: 5 INJECTION, SOLUTION INTRADERMAL at 06:01

## 2022-01-31 RX ADMIN — IPRATROPIUM BROMIDE AND ALBUTEROL SULFATE 3 ML: 2.5; .5 SOLUTION RESPIRATORY (INHALATION) at 02:01

## 2022-01-31 RX ADMIN — NYSTATIN 500000 UNITS: 500000 SUSPENSION ORAL at 03:01

## 2022-01-31 RX ADMIN — POLYETHYLENE GLYCOL 3350 17 G: 17 POWDER, FOR SOLUTION ORAL at 09:01

## 2022-01-31 RX ADMIN — PIPERACILLIN AND TAZOBACTAM 4.5 G: 4; .5 INJECTION, POWDER, FOR SOLUTION INTRAVENOUS at 08:01

## 2022-01-31 RX ADMIN — PANTOPRAZOLE SODIUM 40 MG: 40 INJECTION, POWDER, FOR SOLUTION INTRAVENOUS at 09:01

## 2022-01-31 RX ADMIN — OXYCODONE HYDROCHLORIDE AND ACETAMINOPHEN 500 MG: 500 TABLET ORAL at 09:01

## 2022-01-31 RX ADMIN — NYSTATIN 500000 UNITS: 500000 SUSPENSION ORAL at 08:01

## 2022-01-31 RX ADMIN — DONEPEZIL HYDROCHLORIDE 10 MG: 5 TABLET, FILM COATED ORAL at 08:01

## 2022-01-31 RX ADMIN — TRAZODONE HYDROCHLORIDE 25 MG: 50 TABLET ORAL at 03:01

## 2022-01-31 RX ADMIN — NYSTATIN 500000 UNITS: 500000 SUSPENSION ORAL at 09:01

## 2022-01-31 RX ADMIN — IPRATROPIUM BROMIDE AND ALBUTEROL SULFATE 3 ML: 2.5; .5 SOLUTION RESPIRATORY (INHALATION) at 01:01

## 2022-01-31 RX ADMIN — PIPERACILLIN AND TAZOBACTAM 4.5 G: 4; .5 INJECTION, POWDER, FOR SOLUTION INTRAVENOUS at 04:01

## 2022-01-31 RX ADMIN — IPRATROPIUM BROMIDE AND ALBUTEROL SULFATE 3 ML: 2.5; .5 SOLUTION RESPIRATORY (INHALATION) at 07:01

## 2022-01-31 RX ADMIN — FOLIC ACID 1 MG: 1 TABLET ORAL at 09:01

## 2022-01-31 RX ADMIN — VANCOMYCIN HYDROCHLORIDE 1250 MG: 5 INJECTION, POWDER, LYOPHILIZED, FOR SOLUTION INTRAVENOUS at 05:01

## 2022-01-31 RX ADMIN — BENZTROPINE MESYLATE 0.5 MG: 0.5 TABLET ORAL at 09:01

## 2022-01-31 RX ADMIN — TRAZODONE HYDROCHLORIDE 25 MG: 50 TABLET ORAL at 08:01

## 2022-01-31 RX ADMIN — TRAZODONE HYDROCHLORIDE 25 MG: 50 TABLET ORAL at 09:01

## 2022-01-31 NOTE — ASSESSMENT & PLAN NOTE
U/A  Positive for UTI.  - Rocephin 2g x1 dose in ED  - patient on Vancomycin and [place]  - Urine Cx grew ESBL

## 2022-01-31 NOTE — SUBJECTIVE & OBJECTIVE
Interval History: Patient not responsive at bedside. No acute change since yesterday. No overnight events reported by nurses. Family has expressed wish for him to change nursing homes so PT/OT ordered, as well as a TB skin test and a COVID test. He is still getting IV antibiotics to treat his catheter associated UTI and pneumonia. Currently requiring a Venturi mask at 35% FiO2.     Review of Systems   Unable to perform ROS: Patient nonverbal     Objective:     Vital Signs (Most Recent):  Temp: 97.8 °F (36.6 °C) (01/31/22 1015)  Pulse: 105 (01/31/22 1015)  Resp: 20 (01/31/22 1015)  BP: (!) 89/65 (01/31/22 1015)  SpO2: 98 % (01/31/22 1015) Vital Signs (24h Range):  Temp:  [97.4 °F (36.3 °C)-99.3 °F (37.4 °C)] 97.8 °F (36.6 °C)  Pulse:  [105-130] 105  Resp:  [20-31] 20  SpO2:  [92 %-98 %] 98 %  BP: ()/(54-70) 89/65     Weight: 68 kg (150 lb)  Body mass index is 22.81 kg/m².    Intake/Output Summary (Last 24 hours) at 1/31/2022 1416  Last data filed at 1/31/2022 0508  Gross per 24 hour   Intake 3486.05 ml   Output 850 ml   Net 2636.05 ml      Physical Exam  Vitals and nursing note reviewed.   Constitutional:       Appearance: He is not ill-appearing, toxic-appearing or diaphoretic.   HENT:      Head: Normocephalic and atraumatic.      Nose: Nose normal. No congestion or rhinorrhea.      Mouth/Throat:      Mouth: Mucous membranes are dry.      Pharynx: No oropharyngeal exudate.   Eyes:      Pupils: Pupils are equal, round, and reactive to light.   Cardiovascular:      Rate and Rhythm: Normal rate and regular rhythm.      Pulses: Normal pulses.      Heart sounds: Normal heart sounds. No murmur heard.  No friction rub.   Pulmonary:      Effort: Pulmonary effort is normal. No respiratory distress.      Breath sounds: Normal breath sounds. No wheezing, rhonchi or rales.   Abdominal:      Tenderness: There is no abdominal tenderness. There is no guarding.   Musculoskeletal:      Right lower leg: No edema.      Left  lower leg: No edema.   Skin:     General: Skin is warm.      Capillary Refill: Capillary refill takes less than 2 seconds.   Neurological:      Mental Status: He is alert.   Psychiatric:         Attention and Perception: He is inattentive.         Mood and Affect: Affect is blunt.         Speech: He is noncommunicative.         Behavior: Behavior normal.         Significant Labs: All pertinent labs within the past 24 hours have been reviewed.    Significant Imaging: I have reviewed all pertinent imaging results/findings within the past 24 hours.

## 2022-01-31 NOTE — PLAN OF CARE
[facility]  Initial Discharge Assessment       Primary Care Provider: Angelina Jaramillo II, MD    Admission Diagnosis: Tachypnea [R06.82]  Dehydration [E86.0]  Chest pain [R07.9]  Urinary tract infection associated with indwelling urethral catheter, initial encounter [T83.511A, N39.0]  Sepsis, due to unspecified organism, unspecified whether acute organ dysfunction present [A41.9]    Admission Date: 1/29/2022  Expected Discharge Date:     Discharge Barriers Identified: None    Payor: [facility] MCARE / Plan: [facility] DUAL COMPLETE HMO SNP / Product Type: Medicare Advantage /     Extended Emergency Contact Information  Primary Emergency Contact: Lesli Solis  [place] Phone: [phone]  Mobile Phone: [phone]  Relation: Sister  Preferred language: English   needed? No  Secondary Emergency Contact: Krystyna Ledesma  Mobile Phone: [phone]  Relation: Sister  Preferred language: English   needed? No    Discharge Plan A: Skilled Nursing Facility  Discharge Plan B: Skilled Nursing Facility    No Pharmacies Listed    Initial Assessment (most recent)     Adult Discharge Assessment - 01/31/22 1218        Discharge Assessment    Assessment Type Discharge Planning Assessment     Source of Information family     If unable to respond/provide information was family/caregiver contacted? Yes     Contact Name/Number sister morfin     Lives With facility resident     [facility]ility [facility]     Who is going to help you get home at discharge? sister morfin     Discharge Plan A Skilled Nursing Facility     Discharge Plan B Skilled Nursing Facility     DME Needed Upon Discharge  none     Discharge Plan discussed with: Sibling     Discharge Barriers Identified None        Relationship/Environment    Name(s) of Who Lives With Patient facility resident               SS spoke with pt's sister Lesli Solis, states pt is a  [facility]. Lesli states she does not want pt to return to this facility and gave choice for [facility]. Will inform MD and fax referral. IM obtained. SS following.

## 2022-01-31 NOTE — NURSING
PT has a stage three decubitus on bottom of sacrum applied Aquacel 8x7 , two  stage 2 on left thigh area applied 2 Aquacel 8x5

## 2022-01-31 NOTE — PROGRESS NOTES
[facility] [place] - 6 [facility]  Adult Nutrition  Consult Note         Reason for Assessment  Reason For Assessment: consult   Nutrition Risk Screen: tube feeding or parenteral nutrition  Malnutrition  Is Patient Malnourished: No  Nutrition Diagnosis  Inadequate oral intake   related to Decreased ability to consume sufficient energy as evidenced by  Pt with mohsen dementia    Nutrition Diagnosis Status: Chronic/ continues      Nutrition Risk      Chewing or Swallowing Difficulty?: Swallowing difficulty  Estimated/Assessed Needs  RMR (Bethesda-St. Jeor Equation): 1449.9 Activity Factor: 1 Injury Factor: 1     Temp: 97.4 °F (36.3 °C)Oral  Weight Used For Calorie Calculations: 68 kg (149 lb 14.6 oz)   Energy Need Method: Bethesda-St Jeor Energy Calorie Requirements (kcal): 1740  Weight Used For Protein Calculations: 68 kg (149 lb 14.6 oz)  Protein Requirements: 55-68  Estimated Fluid Requirement Method: RDA Method Fluid Requirements (mL): 1740  RDA Method (mL): 1740     Nutrition Prescription / Recommendations  Recommendation/Intervention: PEG tube feeding: Nepro @ 40ml/hr; H20 flush of 125ml q2hr  Goals: Pt will meet estimated nutritional needs; pt will tolerate tube feeding  Nutrition Goal Status: new  Communication of RD Recs: reviewed with physician  Current Diet Order: NPO/ PEG tube fed  Nutrition Order Comments: PEG tube feeding: Nepro @ 40ml/hr; H20 flush of 125ml q2hr  Current Nutrition Support Formula Ordered: Nepro  Current Nutrition Support Rate Ordered: 40 (ml)  Current Nutrition Support Frequency Ordered: hourly  Recommended Diet: Enteral Nutrition PEG tube feeding: Nepro @ 40ml/hr; H20 flush of 125ml q2hr  Recommended Oral Supplement: No Oral Supplements  Is Nutrition Support Recommended: No  Is Education Recommended: No  Monitor and Evaluation  % current Intake: Enteral Nutrition progressing to goal  % intake to meet estimated needs: Enteral Nutrition   Food and Nutrient Intake: enteral  "nutrition intake  Food and Nutrient Adminstration: enteral and parenteral nutrition administration  Anthropometric Measurements: height/length,weight,weight change,body mass index  Biochemical Data, Medical Tests and Procedures: electrolyte and renal panel,glucose/endocrine profile  Nutrition-Focused Physical Findings: skin  Enteral Calories (kcal): 1728  Enteral Protein (gm): 77  Enteral (Free Water) Fluid (mL): 691  Free Water Flush Fluid (mL): 1500  Total Fluid Intake (mL): 2191  Energy Calories Required: meeting needs  Protein Required: meeting needs  Fluid Required: meeting needs  Tolerance: tolerating  Current Medical Diagnosis and Past Medical History  Diagnosis: psychological disorder  Past Medical History:   Diagnosis Date    Alzheimer disease     Alzheimer's dementia     Arthritis     Hypertension     Metabolic encephalopathy      Nutrition/Diet History  Food Allergies: NKFA  Lab/Procedures/Meds  Recent Labs   Lab 01/29/22  1100 01/29/22  1100 01/31/22  0549   *   < > 155*   K 4.3   < > 3.2*   BUN 94*   < > 107*   CREATININE 2.22*   < > 2.75*   *   < > 196*   CALCIUM 10.3*   < > 10.3*   ALBUMIN 2.2*  --   --    *   < > 116*   *  --   --    *  --   --     < > = values in this interval not displayed.     Last A1c: No results found for: HGBA1C  Lab Results   Component Value Date    RBC 4.04 (L) 01/31/2022    HGB 10.3 (L) 01/31/2022    HCT 31.0 (L) 01/31/2022    MCV 76.7 (L) 01/31/2022    MCH 25.5 (L) 01/31/2022    MCHC 33.2 01/31/2022     Pertinent Labs Reviewed: reviewed  Pertinent Labs Comments: HCT 32.8, Na 153, Cl 114, BUN 94, cr 2.22, glu 161, Mg 3.4, Alb 2.2  Pertinent Medications Reviewed: reviewed  Pertinent Medications Comments: vit C, folic acid  Anthropometrics  Temp: 97.4 °F (36.3 °C)  Height Method: Stated  Height: 5' 8" (172.7 cm)  Height (inches): 68 in  Weight Method: Bed Scale  Weight: 68 kg (150 lb)  Weight (lb): 150 lb  Ideal Body Weight (IBW), Male: " 154 lb  % Ideal Body Weight, Male (lb): 97.4 %  BMI (Calculated): 22.8  BMI Grade: 18.5-24.9 - normal     Nutrition by Nursing  Diet/Nutrition Received: tube feeding           Last Bowel Movement:  (unable to assess)              Nutrition Follow-Up     Assessment and Plan  No new Assessment & Plan notes have been filed under this hospital service since the last note was generated.  Service: Nutrition

## 2022-01-31 NOTE — PLAN OF CARE
Problem: Infection  Goal: Absence of Infection Signs and Symptoms  Outcome: Ongoing, Progressing     Problem: Adult Inpatient Plan of Care  Goal: Plan of Care Review  Outcome: Ongoing, Progressing  Goal: Patient-Specific Goal (Individualized)  Outcome: Ongoing, Progressing  Goal: Absence of Hospital-Acquired Illness or Injury  Outcome: Ongoing, Progressing  Goal: Optimal Comfort and Wellbeing  Outcome: Ongoing, Progressing  Goal: Readiness for Transition of Care  Outcome: Ongoing, Progressing     Problem: Adjustment to Illness (Sepsis/Septic Shock)  Goal: Optimal Coping  Outcome: Ongoing, Progressing     Problem: Bleeding (Sepsis/Septic Shock)  Goal: Absence of Bleeding  Outcome: Ongoing, Progressing     Problem: Glycemic Control Impaired (Sepsis/Septic Shock)  Goal: Blood Glucose Level Within Desired Range  Outcome: Ongoing, Progressing     Problem: Infection Progression (Sepsis/Septic Shock)  Goal: Absence of Infection Signs and Symptoms  Outcome: Ongoing, Progressing     Problem: Nutrition Impaired (Sepsis/Septic Shock)  Goal: Optimal Nutrition Intake  Outcome: Ongoing, Progressing     Problem: Fluid Imbalance (Pneumonia)  Goal: Fluid Balance  Outcome: Ongoing, Progressing     Problem: Infection (Pneumonia)  Goal: Resolution of Infection Signs and Symptoms  Outcome: Ongoing, Progressing     Problem: Respiratory Compromise (Pneumonia)  Goal: Effective Oxygenation and Ventilation  Outcome: Ongoing, Progressing     Problem: Fall Injury Risk  Goal: Absence of Fall and Fall-Related Injury  Outcome: Ongoing, Progressing     Problem: Skin Injury Risk Increased  Goal: Skin Health and Integrity  Outcome: Ongoing, Progressing

## 2022-01-31 NOTE — PROGRESS NOTES
[facility] - [address] [place] [address] Medicine  Progress Note    Patient Name: Owen Solis  MRN: [id number]  Patient Class: IP- Inpatient   Admission Date: 1/29/2022  Length of Stay: 2 days  Attending Physician: Freya Grant,*  Primary Care Provider: Angelina Jaramillo II, MD        Subjective:     Principal Problem:UTI (urinary tract infection)        HPI:  Owen Solis is a 61 y.o.m. with a PMHx of HTN, Alzheimer's Dz, metabolic encephalopathy, OA, and dementia that presents to [facility] ED from [facility] for fever, tachypnea, HTN, hypoxia and AMS. In the ED UA was positive for UTI and CXR show bibasilar atelectasis/infiltrates. ED nurse stated patient came into ED with upton cath in place that contain an extensive amount of sediment. Patient was admitted to inpatient family medicine for further care and treatment. According to [facility] patient was ambulatory and could talk, although inappropriate wording, until he contracted COVID-19 in August 2021. Since then patient has been nonverbal and nonambulatory. Patient is fed through a PEG tube due to not being able to swallow. According to NH patient has had to be admitted to the hospital several times recently for PNA. NH also state patient is Full code as did patient's sister. Patient's sister was contacted via phone and she verified what the NH reported.       Overview/Hospital Course:  No notes on file    Interval History: Patient not responsive at bedside. No acute change since yesterday. No overnight events reported by nurses. Family has expressed wish for him to change nursing homes so PT/OT ordered, as well as a TB skin test and a COVID test. He is still getting IV antibiotics to treat his catheter associated UTI and pneumonia. Currently requiring a Venturi mask at 35% FiO2.     Review of Systems   Unable to perform ROS: Patient nonverbal     Objective:     Vital Signs (Most Recent):  Temp: 97.8 °F (36.6 °C) (01/31/22 1015)  Pulse: 105  (01/31/22 1015)  Resp: 20 (01/31/22 1015)  BP: (!) 89/65 (01/31/22 1015)  SpO2: 98 % (01/31/22 1015) Vital Signs (24h Range):  Temp:  [97.4 °F (36.3 °C)-99.3 °F (37.4 °C)] 97.8 °F (36.6 °C)  Pulse:  [105-130] 105  Resp:  [20-31] 20  SpO2:  [92 %-98 %] 98 %  BP: ()/(54-70) 89/65     Weight: 68 kg (150 lb)  Body mass index is 22.81 kg/m².    Intake/Output Summary (Last 24 hours) at 1/31/2022 1416  Last data filed at 1/31/2022 0508  Gross per 24 hour   Intake 3486.05 ml   Output 850 ml   Net 2636.05 ml      Physical Exam  Vitals and nursing note reviewed.   Constitutional:       Appearance: He is not ill-appearing, toxic-appearing or diaphoretic.   HENT:      Head: Normocephalic and atraumatic.      Nose: Nose normal. No congestion or rhinorrhea.      Mouth/Throat:      Mouth: Mucous membranes are dry.      Pharynx: No oropharyngeal exudate.   Eyes:      Pupils: Pupils are equal, round, and reactive to light.   Cardiovascular:      Rate and Rhythm: Normal rate and regular rhythm.      Pulses: Normal pulses.      Heart sounds: Normal heart sounds. No murmur heard.  No friction rub.   Pulmonary:      Effort: Pulmonary effort is normal. No respiratory distress.      Breath sounds: Normal breath sounds. No wheezing, rhonchi or rales.   Abdominal:      Tenderness: There is no abdominal tenderness. There is no guarding.   Musculoskeletal:      Right lower leg: No edema.      Left lower leg: No edema.   Skin:     General: Skin is warm.      Capillary Refill: Capillary refill takes less than 2 seconds.   Neurological:      Mental Status: He is alert.   Psychiatric:         Attention and Perception: He is inattentive.         Mood and Affect: Affect is blunt.         Speech: He is noncommunicative.         Behavior: Behavior normal.         Significant Labs: All pertinent labs within the past 24 hours have been reviewed.    Significant Imaging: I have reviewed all pertinent imaging results/findings within the past 24  hours.      Assessment/Plan:      * UTI (urinary tract infection)  U/A  Positive for UTI.  - Rocephin 2g x1 dose in ED  - patient on Vancomycin and Merram  - Urine Cx grew ESBL    Healthcare-associated pneumonia  Patient came from [facility]  - Vancomycin  - Zosyn 4.5g Q8H  - Blood Cx x2 no growth   - Supplemental O2   - telemetry   - Duo nebs Q8H    Alzheimer disease  - Donepezil 10mg QD  - Benztropine 0.5 BID  - family wants patient to be switched to [facility] after discharge, PT/OT ordered, COVID test and TB pending       Dysphagia  - PEG tube placed, intact  - Patient getting 1/2NS with 5% dextrose as continuous fluids     Elevated liver enzymes  On admission  and   - fluids and monitor CMP daily      GERD (gastroesophageal reflux disease)  Protonix 40mg IV QD      Depression  Trazodone 50mg BID        Primary hypertension  Holding Metoprolol succinate 25mg QD due to HoTN      OA (osteoarthritis)  - Flexeril 10mg BID  - acetaminophen PRN       VTE Risk Mitigation (From admission, onward)         Ordered     IP VTE LOW RISK PATIENT  Once         01/29/22 1428     Place sequential compression device  Until discontinued         01/29/22 1428                Discharge Planning   ESTEFANY:      Code Status: Full Code   Is the patient medically ready for discharge?:     Reason for patient still in hospital (select all that apply): Treatment  Discharge Plan A: Skilled Nursing Facility                  Mitra Miguel MD  Department of Hospital Medicine   [address]

## 2022-01-31 NOTE — ASSESSMENT & PLAN NOTE
- Donepezil 10mg QD  - Benztropine 0.5 BID  - family wants patient to be switched to [facility] after discharge, PT/OT ordered, COVID test and TB pending

## 2022-01-31 NOTE — PLAN OF CARE
SS attempted to call pt's family at both numbers listed. Unable to leave voicemail. SS will try to call again.

## 2022-01-31 NOTE — ASSESSMENT & PLAN NOTE
Patient came from [facility]  - Vancomycin  - Zosyn 4.5g Q8H  - Blood Cx x2 no growth   - Supplemental O2   - telemetry   - Duo nebs Q8H

## 2022-02-01 PROBLEM — R09.02 HYPOXIA: Status: ACTIVE | Noted: 2022-02-01

## 2022-02-01 PROBLEM — R09.02 HYPOXIA: Status: RESOLVED | Noted: 2022-02-01 | Resolved: 2022-02-01

## 2022-02-01 PROBLEM — S31.000A SACRAL WOUND: Status: ACTIVE | Noted: 2022-02-01

## 2022-02-01 LAB
ANION GAP SERPL CALCULATED.3IONS-SCNC: 17 MMOL/L (ref 7–16)
BASOPHILS # BLD AUTO: 0.03 K/UL (ref 0–0.2)
BASOPHILS NFR BLD AUTO: 0.1 % (ref 0–1)
BUN SERPL-MCNC: 89 MG/DL (ref 7–18)
BUN/CREAT SERPL: 39 (ref 6–20)
CALCIUM SERPL-MCNC: 10.1 MG/DL (ref 8.5–10.1)
CHLORIDE SERPL-SCNC: 117 MMOL/L (ref 98–107)
CO2 SERPL-SCNC: 27 MMOL/L (ref 21–32)
CREAT SERPL-MCNC: 2.29 MG/DL (ref 0.7–1.3)
DIFFERENTIAL METHOD BLD: ABNORMAL
EOSINOPHIL # BLD AUTO: 0.18 K/UL (ref 0–0.5)
EOSINOPHIL NFR BLD AUTO: 0.8 % (ref 1–4)
ERYTHROCYTE [DISTWIDTH] IN BLOOD BY AUTOMATED COUNT: 21.8 % (ref 11.5–14.5)
GLUCOSE SERPL-MCNC: 138 MG/DL (ref 70–105)
GLUCOSE SERPL-MCNC: 165 MG/DL (ref 70–105)
GLUCOSE SERPL-MCNC: 178 MG/DL (ref 70–105)
GLUCOSE SERPL-MCNC: 183 MG/DL (ref 74–106)
HCT VFR BLD AUTO: 28.4 % (ref 40–54)
HGB BLD-MCNC: 9.4 G/DL (ref 13.5–18)
IMM GRANULOCYTES # BLD AUTO: 0.34 K/UL (ref 0–0.04)
IMM GRANULOCYTES NFR BLD: 1.5 % (ref 0–0.4)
LYMPHOCYTES # BLD AUTO: 1.19 K/UL (ref 1–4.8)
LYMPHOCYTES NFR BLD AUTO: 5.3 % (ref 27–41)
MAGNESIUM SERPL-MCNC: 3.6 MG/DL (ref 1.7–2.3)
MCH RBC QN AUTO: 25.3 PG (ref 27–31)
MCHC RBC AUTO-ENTMCNC: 33.1 G/DL (ref 32–36)
MCV RBC AUTO: 76.5 FL (ref 80–96)
MONOCYTES # BLD AUTO: 1.06 K/UL (ref 0–0.8)
MONOCYTES NFR BLD AUTO: 4.7 % (ref 2–6)
MPC BLD CALC-MCNC: ABNORMAL G/DL
NEUTROPHILS # BLD AUTO: 19.73 K/UL (ref 1.8–7.7)
NEUTROPHILS NFR BLD AUTO: 87.6 % (ref 53–65)
NRBC # BLD AUTO: 0.06 X10E3/UL
NRBC, AUTO (.00): 0.3 %
PLATELET # BLD AUTO: 331 K/UL (ref 150–400)
POTASSIUM SERPL-SCNC: 2.5 MMOL/L (ref 3.5–5.1)
RBC # BLD AUTO: 3.71 M/UL (ref 4.6–6.2)
SODIUM SERPL-SCNC: 158 MMOL/L (ref 136–145)
VANCOMYCIN TROUGH SERPL-MCNC: 9.5 ΜG/ML (ref 10–20)
WBC # BLD AUTO: 22.53 K/UL (ref 4.5–11)

## 2022-02-01 PROCEDURE — 63600175 PHARM REV CODE 636 W HCPCS

## 2022-02-01 PROCEDURE — 99232 PR SUBSEQUENT HOSPITAL CARE,LEVL II: ICD-10-PCS | Mod: GC,,, | Performed by: INTERNAL MEDICINE

## 2022-02-01 PROCEDURE — 25000003 PHARM REV CODE 250: Performed by: INTERNAL MEDICINE

## 2022-02-01 PROCEDURE — 25000003 PHARM REV CODE 250: Performed by: FAMILY MEDICINE

## 2022-02-01 PROCEDURE — 36415 COLL VENOUS BLD VENIPUNCTURE: CPT

## 2022-02-01 PROCEDURE — 83735 ASSAY OF MAGNESIUM: CPT | Performed by: INTERNAL MEDICINE

## 2022-02-01 PROCEDURE — 94761 N-INVAS EAR/PLS OXIMETRY MLT: CPT

## 2022-02-01 PROCEDURE — S5010 5% DEXTROSE AND 0.45% SALINE: HCPCS

## 2022-02-01 PROCEDURE — 93010 ELECTROCARDIOGRAM REPORT: CPT | Mod: ,,, | Performed by: INTERNAL MEDICINE

## 2022-02-01 PROCEDURE — 63600175 PHARM REV CODE 636 W HCPCS: Performed by: INTERNAL MEDICINE

## 2022-02-01 PROCEDURE — 93010 EKG 12-LEAD: ICD-10-PCS | Mod: ,,, | Performed by: INTERNAL MEDICINE

## 2022-02-01 PROCEDURE — 80048 BASIC METABOLIC PNL TOTAL CA: CPT

## 2022-02-01 PROCEDURE — 27000221 HC OXYGEN, UP TO 24 HOURS

## 2022-02-01 PROCEDURE — 94640 AIRWAY INHALATION TREATMENT: CPT

## 2022-02-01 PROCEDURE — 11000001 HC ACUTE MED/SURG PRIVATE ROOM

## 2022-02-01 PROCEDURE — 25000003 PHARM REV CODE 250

## 2022-02-01 PROCEDURE — 82962 GLUCOSE BLOOD TEST: CPT

## 2022-02-01 PROCEDURE — 93005 ELECTROCARDIOGRAM TRACING: CPT

## 2022-02-01 PROCEDURE — 25000242 PHARM REV CODE 250 ALT 637 W/ HCPCS

## 2022-02-01 PROCEDURE — 99232 SBSQ HOSP IP/OBS MODERATE 35: CPT | Mod: GC,,, | Performed by: INTERNAL MEDICINE

## 2022-02-01 PROCEDURE — C9113 INJ PANTOPRAZOLE SODIUM, VIA: HCPCS

## 2022-02-01 PROCEDURE — 85025 COMPLETE CBC W/AUTO DIFF WBC: CPT

## 2022-02-01 PROCEDURE — 80202 ASSAY OF VANCOMYCIN: CPT

## 2022-02-01 PROCEDURE — 63700000 PHARM REV CODE 250 ALT 637 W/O HCPCS: Performed by: INTERNAL MEDICINE

## 2022-02-01 RX ORDER — POTASSIUM CHLORIDE 7.45 MG/ML
10 INJECTION INTRAVENOUS
Status: DISPENSED | OUTPATIENT
Start: 2022-02-01 | End: 2022-02-01

## 2022-02-01 RX ORDER — FLUCONAZOLE 2 MG/ML
200 INJECTION, SOLUTION INTRAVENOUS
Status: DISCONTINUED | OUTPATIENT
Start: 2022-02-01 | End: 2022-02-01

## 2022-02-01 RX ORDER — DONEPEZIL HYDROCHLORIDE 5 MG/1
10 TABLET, FILM COATED ORAL NIGHTLY
Status: DISCONTINUED | OUTPATIENT
Start: 2022-02-01 | End: 2022-02-07 | Stop reason: HOSPADM

## 2022-02-01 RX ORDER — POTASSIUM CHLORIDE 7.45 MG/ML
40 INJECTION INTRAVENOUS ONCE
Status: DISCONTINUED | OUTPATIENT
Start: 2022-02-01 | End: 2022-02-01

## 2022-02-01 RX ORDER — DOXYLAMINE SUCCINATE 25 MG
TABLET ORAL 2 TIMES DAILY
Status: DISCONTINUED | OUTPATIENT
Start: 2022-02-01 | End: 2022-02-07 | Stop reason: HOSPADM

## 2022-02-01 RX ADMIN — TRAZODONE HYDROCHLORIDE 25 MG: 50 TABLET ORAL at 09:02

## 2022-02-01 RX ADMIN — PANTOPRAZOLE SODIUM 40 MG: 40 INJECTION, POWDER, FOR SOLUTION INTRAVENOUS at 09:02

## 2022-02-01 RX ADMIN — TRAZODONE HYDROCHLORIDE 25 MG: 50 TABLET ORAL at 05:02

## 2022-02-01 RX ADMIN — POLYETHYLENE GLYCOL 3350 17 G: 17 POWDER, FOR SOLUTION ORAL at 09:02

## 2022-02-01 RX ADMIN — BENZTROPINE MESYLATE 0.5 MG: 0.5 TABLET ORAL at 09:02

## 2022-02-01 RX ADMIN — PIPERACILLIN AND TAZOBACTAM 4.5 G: 4; .5 INJECTION, POWDER, FOR SOLUTION INTRAVENOUS at 09:02

## 2022-02-01 RX ADMIN — MICONAZOLE NITRATE: 20 CREAM TOPICAL at 12:02

## 2022-02-01 RX ADMIN — NYSTATIN 500000 UNITS: 500000 SUSPENSION ORAL at 09:02

## 2022-02-01 RX ADMIN — PIPERACILLIN AND TAZOBACTAM 4.5 G: 4; .5 INJECTION, POWDER, FOR SOLUTION INTRAVENOUS at 11:02

## 2022-02-01 RX ADMIN — PIPERACILLIN AND TAZOBACTAM 4.5 G: 4; .5 INJECTION, POWDER, FOR SOLUTION INTRAVENOUS at 12:02

## 2022-02-01 RX ADMIN — OXYCODONE HYDROCHLORIDE AND ACETAMINOPHEN 500 MG: 500 TABLET ORAL at 09:02

## 2022-02-01 RX ADMIN — IPRATROPIUM BROMIDE AND ALBUTEROL SULFATE 3 ML: 2.5; .5 SOLUTION RESPIRATORY (INHALATION) at 03:02

## 2022-02-01 RX ADMIN — NYSTATIN 500000 UNITS: 500000 SUSPENSION ORAL at 05:02

## 2022-02-01 RX ADMIN — DONEPEZIL HYDROCHLORIDE 10 MG: 5 TABLET, FILM COATED ORAL at 09:02

## 2022-02-01 RX ADMIN — POTASSIUM CHLORIDE 10 MEQ: 7.46 INJECTION, SOLUTION INTRAVENOUS at 05:02

## 2022-02-01 RX ADMIN — FOLIC ACID 1 MG: 1 TABLET ORAL at 09:02

## 2022-02-01 RX ADMIN — PIPERACILLIN AND TAZOBACTAM 4.5 G: 4; .5 INJECTION, POWDER, FOR SOLUTION INTRAVENOUS at 05:02

## 2022-02-01 RX ADMIN — DEXTROSE AND SODIUM CHLORIDE: 5; 450 INJECTION, SOLUTION INTRAVENOUS at 05:02

## 2022-02-01 RX ADMIN — POTASSIUM CHLORIDE 10 MEQ: 7.46 INJECTION, SOLUTION INTRAVENOUS at 03:02

## 2022-02-01 RX ADMIN — IPRATROPIUM BROMIDE AND ALBUTEROL SULFATE 3 ML: 2.5; .5 SOLUTION RESPIRATORY (INHALATION) at 08:02

## 2022-02-01 RX ADMIN — POTASSIUM CHLORIDE 10 MEQ: 7.46 INJECTION, SOLUTION INTRAVENOUS at 04:02

## 2022-02-01 RX ADMIN — IPRATROPIUM BROMIDE AND ALBUTEROL SULFATE 3 ML: 2.5; .5 SOLUTION RESPIRATORY (INHALATION) at 12:02

## 2022-02-01 RX ADMIN — FLUCONAZOLE 150 MG: 50 TABLET ORAL at 12:02

## 2022-02-01 RX ADMIN — POTASSIUM CHLORIDE 10 MEQ: 7.46 INJECTION, SOLUTION INTRAVENOUS at 11:02

## 2022-02-01 NOTE — PT/OT/SLP EVAL
PT screen only. Pt is from nursing home and was previous ambulatory prior to to COVID infection. At this time he is not following commands. He will make eye contact intermittently but is unable to communicate. PT not appropriate at this time.

## 2022-02-01 NOTE — ASSESSMENT & PLAN NOTE
U/A  Positive for UTI.  - Rocephin 2g x1 dose in ED  - patient on Vancomycin and Merram  - Urine Cx grew ESBL  2/1: >100,000 yeast; Started Fluconazole IV 200mg for 14 days; likely secondary to Vancomycin and Zosyn for HCAP

## 2022-02-01 NOTE — SUBJECTIVE & OBJECTIVE
Interval History: Patient is laying in bed comfortably and sleeping. Phlebotomy was taking blood upon entering. Patient is non-verbal. Wound care consulted for skin breakdown around PEG tube and for sacral skin breakdown. Patient on venti mask at 30%O2; saturating at 92%    Review of Systems   Unable to perform ROS: Patient nonverbal     Objective:     Vital Signs (Most Recent):  Temp: 98.3 °F (36.8 °C) (02/01/22 0804)  Pulse: 102 (02/01/22 0812)  Resp: 20 (02/01/22 0812)  BP: (!) 102/59 (02/01/22 0804)  SpO2: (!) 92 % (02/01/22 0812) Vital Signs (24h Range):  Temp:  [97.5 °F (36.4 °C)-98.5 °F (36.9 °C)] 98.3 °F (36.8 °C)  Pulse:  [] 102  Resp:  [20-26] 20  SpO2:  [88 %-99 %] 92 %  BP: ()/(58-73) 102/59     Weight: 68 kg (150 lb)  Body mass index is 22.81 kg/m².    Intake/Output Summary (Last 24 hours) at 2/1/2022 0903  Last data filed at 2/1/2022 0600  Gross per 24 hour   Intake 2065 ml   Output 2400 ml   Net -335 ml      Physical Exam  Vitals and nursing note reviewed.   Constitutional:       Appearance: He is not ill-appearing, toxic-appearing or diaphoretic.   HENT:      Head: Normocephalic and atraumatic.      Comments: Poor oral hygiene; oral candidiasis      Nose: Nose normal. No congestion or rhinorrhea.      Mouth/Throat:      Mouth: Mucous membranes are dry.      Pharynx: No oropharyngeal exudate.   Eyes:      Pupils: Pupils are equal, round, and reactive to light.   Cardiovascular:      Rate and Rhythm: Normal rate and regular rhythm.      Pulses: Normal pulses.      Heart sounds: Normal heart sounds. No murmur heard.  No friction rub.   Pulmonary:      Effort: Pulmonary effort is normal. No respiratory distress.      Breath sounds: Normal breath sounds. No wheezing, rhonchi or rales.   Abdominal:      Tenderness: There is no abdominal tenderness. There is no guarding.   Musculoskeletal:      Right lower leg: No edema.      Left lower leg: No edema.   Skin:     General: Skin is warm.       Capillary Refill: Capillary refill takes less than 2 seconds.      Findings: Lesion (Sacral ulcer and skin breakdown around PEG tube) present.   Neurological:      Mental Status: He is alert.   Psychiatric:         Attention and Perception: He is inattentive.         Mood and Affect: Affect is blunt.         Speech: He is noncommunicative.         Behavior: Behavior normal.         Significant Labs: All pertinent labs within the past 24 hours have been reviewed.    Significant Imaging: I have reviewed all pertinent imaging results/findings within the past 24 hours.

## 2022-02-01 NOTE — PLAN OF CARE
SS rec'd consult for nursing home placement. Referral already sent to [facility] and pt accepted. Pt will need TB skin test, COVID test, and upton catheter d/c before arriving at [facility]. MD aware.

## 2022-02-01 NOTE — CONSULTS
[facility] - [address] [place] [address]  Adult Nutrition  Consult Note         Reason for Assessment  Reason For Assessment: consult EN  Nutrition Risk Screen: tube feeding or parenteral nutrition  Malnutrition  Is Patient Malnourished: No  Skin Integrity  Pancho Risk Assessment  Sensory Perception: 1-->completely limited  Moisture: 4-->rarely moist  Activity: 1-->bedfast  Mobility: 1-->completely immobile  Nutrition: 2-->probably inadequate  Friction and Shear: 1-->problem  Pancho Score: 10  Comments on skin integrity: pancho low but none remarakble  Nutrition Diagnosis  Self monitoring deficit   related to Impaired cognitive or learning abilities /psychological causes/ Altered Mental Status as evidenced by Alzheimers disease with swallowing difficulty    Nutrition Diagnosis Status: New      Comments: EN via PEG tube  Nutrition Risk  Level of Risk/Frequency of Follow-up: high  Comments on nutrition risk: EN is sole source nutrition   Recent Labs   Lab 02/01/22  0427 02/01/22  0725 02/01/22  1052   GLU  --  183*  --    POCGLU   < >  --  165*    < > = values in this interval not displayed.     Comments on Glucose: elevated but no diagnosis of diabetes  Nutrition Prescription / Recommendations  Recommendation/Intervention: EN feedings  Goals: EN tolerance, EN to goal, weight maintenance  Nutrition Goal Status: progressing towards goal  Communication of RD Recs: reviewed with physician  Current Diet Order: PEG  Nutrition Order Comments: PEG tube feeding: Nepro @ 40ml/hr; H20 flush of 125ml q2hr  Current Nutrition Support Formula Ordered: Nepro  Current Nutrition Support Rate Ordered: 40 (ml)  Current Nutrition Support Frequency Ordered: hourly    Recommended Diet: Enteral Nutrition  Recommended Oral Supplement: No Oral Supplements  Is Nutrition Support Recommended: Yes   Needs Calculated  Energy Need Method: Anette Pearson Energy Calorie Requirements (kcal): 1738  Protein Requirements: 68-81  Enteral Nutrition    Enteral Nutrition Formula Provides:  1728 kcals  77 g Protein  143 g Carbohydrates  94 g Fat Propofol Rate: No  646 ml Fluid without Flush    1500 ml Fluid by flush   2146 ml Total Fluid  Enteral Nutrition Recommended Order:  Tube feeding via NG/ Gallia Sump  Tube feeding formula: Nepro 1.8 Continuous 40 ml/h  Free Water Flush: 125 ml every 2 hours  Modular Supplements:No Modular Supplements needed  Enteral Nutrition meets needs?: yes  Enteral Nutrition Status: New Order    Is Education Recommended: No  Monitor and Evaluation  % current Intake: Enteral Nutrition at goal  % intake to meet estimated needs: Enteral Nutrition   Food and Nutrient Intake: enteral nutrition intake  Food and Nutrient Adminstration: enteral and parenteral nutrition administration  Anthropometric Measurements: weight change  Biochemical Data, Medical Tests and Procedures: electrolyte and renal panel,glucose/endocrine profile  Nutrition-Focused Physical Findings: overall appearance,skin  Enteral Calories (kcal): 1728  Enteral Protein (gm): 77  Enteral (Free Water) Fluid (mL): 691  Free Water Flush Fluid (mL): 1500  Total Fluid Intake (mL): 2191  Energy Calories Required: meeting needs  Protein Required: meeting needs  Fluid Required: meeting needs  Tolerance: tolerating  Current Medical Diagnosis and Past Medical History  Diagnosis: renal disease,psychological disorder,cardiac disease,liver disease  Past Medical History:   Diagnosis Date    Alzheimer disease     Alzheimer's dementia     Arthritis     Hypertension     Metabolic encephalopathy      Nutrition/Diet History  Spiritual, Cultural Beliefs, Advent Practices, Values that Affect Care: no  Food Allergies: NKFA  Factors Affecting Nutritional Intake: inability to feed self  Lab/Procedures/Meds  Recent Labs   Lab 02/01/22  0427 02/01/22  0725 02/01/22  1052   NA  --  158*  --    K  --  2.5*  --    BUN  --  89*  --    CREATININE  --  2.29*  --    GLU  --  183*  --    POCGLU   < >  --  " 165*   CALCIUM  --  10.1  --    CL  --  117*  --     < > = values in this interval not displayed.     Last A1c: No results found for: HGBA1C  Lab Results   Component Value Date    RBC 3.71 (L) 02/01/2022    HGB 9.4 (L) 02/01/2022    HCT 28.4 (L) 02/01/2022    MCV 76.5 (L) 02/01/2022    MCH 25.3 (L) 02/01/2022    MCHC 33.1 02/01/2022     Pertinent Labs Reviewed: reviewed  Pertinent Labs Comments: HCT 32.8, Na 153, Cl 114, BUN 94, cr 2.22, glu 161, Mg 3.4, Alb 2.2  Pertinent Medications Reviewed: reviewed  Pertinent Medications Comments: vit C, folic acid  Anthropometrics  Temp: 98.3 °F (36.8 °C)  Height Method: Stated  Height: 5' 8" (172.7 cm)  Height (inches): 68 in  Weight Method: Bed Scale  Weight: 68 kg (149 lb 14.6 oz)  Weight (lb): 149.91 lb  Ideal Body Weight (IBW), Male: 154 lb  % Ideal Body Weight, Male (lb): 97.34 %  BMI (Calculated): 22.8  BMI Grade: 18.5-24.9 - normal     Estimated/Assessed Needs  RMR (Eddy-St. Jeor Equation): 1449.5 Activity Factor: 1.2 Injury Factor: 1     Temp: 98.3 °F (36.8 °C)Oral  Weight Used For Calorie Calculations: 68 kg (149 lb 14.6 oz)   Energy Need Method: Eddy-St Jeor Energy Calorie Requirements (kcal): 1738  Weight Used For Protein Calculations: 68 kg (149 lb 14.6 oz)  Protein Requirements: 68-81  Estimated Fluid Requirement Method: RDA Method Fluid Requirements (mL): 1740  RDA Method (mL): 1738     Nutrition by Nursing  Diet/Nutrition Received: tube feeding           Last Bowel Movement: 01/31/22              Nutrition Follow-Up  RD Follow-up?: Yes  Assessment and Plan  No new Assessment & Plan notes have been filed under this hospital service since the last note was generated.  Service: Nutrition     "

## 2022-02-01 NOTE — ASSESSMENT & PLAN NOTE
- Wound Care consulted - appreciate recommendations for PEG tube site and sacral ulcer   - Stage 3 Sacral Ulcer

## 2022-02-01 NOTE — ASSESSMENT & PLAN NOTE
- PEG tube placed, intact  - Patient getting 1/2NS with 5% dextrose as continuous fluids   - PEG tube is feed at a rate of 40ml/hr with 125flush q2hrs

## 2022-02-01 NOTE — PROGRESS NOTES
[address] Medicine  Progress Note    Patient Name: Owen Solis  MRN: [id number]  Patient Class: IP- Inpatient   Admission Date: 1/29/2022  Length of Stay: 3 days  Attending Physician: Freya Grant,*  Primary Care Provider: Angelina Jaramillo II, MD        Subjective:     Principal Problem:UTI (urinary tract infection)        HPI:  Owen Solis is a 61 y.o.m. with a PMHx of HTN, Alzheimer's Dz, metabolic encephalopathy, OA, and dementia that presents to [facility] ED from [facility] for fever, tachypnea, HTN, hypoxia and AMS. In the ED UA was positive for UTI and CXR show bibasilar atelectasis/infiltrates. ED nurse stated patient came into ED with upton cath in place that contain an extensive amount of sediment. Patient was admitted to inpatient family medicine for further care and treatment. According to [facility] patient was ambulatory and could talk, although inappropriate wording, until he contracted COVID-19 in August 2021. Since then patient has been nonverbal and nonambulatory. Patient is fed through a PEG tube due to not being able to swallow. According to NH patient has had to be admitted to the hospital several times recently for PNA. NH also state patient is Full code as did patient's sister. Patient's sister was contacted via phone and she verified what the NH reported.       Overview/Hospital Course:  No notes on file    Interval History: Patient is laying in bed comfortably and sleeping. Phlebotomy was taking blood upon entering. Patient is non-verbal. Wound care consulted for skin breakdown around PEG tube and for sacral skin breakdown. Patient on venti mask at 30%O2; saturating at 92%    Review of Systems   Unable to perform ROS: Patient nonverbal     Objective:     Vital Signs (Most Recent):  Temp: 98.3 °F (36.8 °C) (02/01/22 0804)  Pulse: 102 (02/01/22 0812)  Resp: 20 (02/01/22 0812)  BP: (!) 102/59 (02/01/22 0804)  SpO2: (!) 92 % (02/01/22 0812) Vital  Signs (24h Range):  Temp:  [97.5 °F (36.4 °C)-98.5 °F (36.9 °C)] 98.3 °F (36.8 °C)  Pulse:  [] 102  Resp:  [20-26] 20  SpO2:  [88 %-99 %] 92 %  BP: ()/(58-73) 102/59     Weight: 68 kg (150 lb)  Body mass index is 22.81 kg/m².    Intake/Output Summary (Last 24 hours) at 2/1/2022 0903  Last data filed at 2/1/2022 0600  Gross per 24 hour   Intake 2065 ml   Output 2400 ml   Net -335 ml      Physical Exam  Vitals and nursing note reviewed.   Constitutional:       Appearance: He is not ill-appearing, toxic-appearing or diaphoretic.   HENT:      Head: Normocephalic and atraumatic.      Comments: Poor oral hygiene; oral candidiasis      Nose: Nose normal. No congestion or rhinorrhea.      Mouth/Throat:      Mouth: Mucous membranes are dry.      Pharynx: No oropharyngeal exudate.   Eyes:      Pupils: Pupils are equal, round, and reactive to light.   Cardiovascular:      Rate and Rhythm: Normal rate and regular rhythm.      Pulses: Normal pulses.      Heart sounds: Normal heart sounds. No murmur heard.  No friction rub.   Pulmonary:      Effort: Pulmonary effort is normal. No respiratory distress.      Breath sounds: Normal breath sounds. No wheezing, rhonchi or rales.   Abdominal:      Tenderness: There is no abdominal tenderness. There is no guarding.   Musculoskeletal:      Right lower leg: No edema.      Left lower leg: No edema.   Skin:     General: Skin is warm.      Capillary Refill: Capillary refill takes less than 2 seconds.      Findings: Lesion (Sacral ulcer and skin breakdown around PEG tube) present.   Neurological:      Mental Status: He is alert.   Psychiatric:         Attention and Perception: He is inattentive.         Mood and Affect: Affect is blunt.         Speech: He is noncommunicative.         Behavior: Behavior normal.         Significant Labs: All pertinent labs within the past 24 hours have been reviewed.    Significant Imaging: I have reviewed all pertinent imaging results/findings  within the past 24 hours.      Assessment/Plan:      * UTI (urinary tract infection)  U/A  Positive for UTI.  - Rocephin 2g x1 dose in ED  - patient on Vancomycin and Merram  - Urine Cx grew ESBL  2/1: >100,000 yeast; Started Fluconazole IV 200mg for 14 days; likely secondary to Vancomycin and Zosyn for HCAP    Sacral wound  - Wound Care consulted - appreciate recommendations for PEG tube site and sacral ulcer   - Stage 3 Sacral Ulcer        Elevated liver enzymes  On admission  and   - fluids and monitor CMP daily      GERD (gastroesophageal reflux disease)  Protonix 40mg IV QD      Depression  Trazodone 50mg BID        Primary hypertension  Holding Metoprolol succinate 25mg QD due to HoTN      OA (osteoarthritis)  - Flexeril 10mg BID  - acetaminophen PRN     Healthcare-associated pneumonia  Patient came from [facility]  - Vancomycin (Day 4)   - Zosyn 4.5g Q8H (Day 4)  - Blood Cx x2 no growth   - Supplemental O2   - telemetry   - Duo nebs Q8H  - Patient on 30% oxygen on partial non-rebreather     Dysphagia  - PEG tube placed, intact  - Patient getting 1/2NS with 5% dextrose as continuous fluids   - PEG tube is feed at a rate of 40ml/hr with 125flush q2hrs      Alzheimer disease  - Donepezil 10mg QD  - Benztropine 0.5 BID  - family wants patient to be switched to [facility] after discharge, PT/OT ordered, COVID test and TB pending         VTE Risk Mitigation (From admission, onward)         Ordered     IP VTE LOW RISK PATIENT  Once         01/29/22 1428     Place sequential compression device  Until discontinued         01/29/22 1428                Discharge Planning   ESTEFANY:      Code Status: Full Code   Is the patient medically ready for discharge?:     Reason for patient still in hospital (select all that apply): Treatment  Discharge Plan A: Skilled Nursing Facility                  Judith Valdez MD  Department of Hospital Medicine   [address]

## 2022-02-01 NOTE — ASSESSMENT & PLAN NOTE
Patient came from [facility]  - Vancomycin (Day 4)   - Zosyn 4.5g Q8H (Day 4)  - Blood Cx x2 no growth   - Supplemental O2   - telemetry   - Duo nebs Q8H  - Patient on 30% oxygen on partial non-rebreather

## 2022-02-01 NOTE — PROGRESS NOTES
OT screen only. Pt resident at nursing home and according to NH pt ambulatory and verbal prior to COVID in August 2021. Pt unable to follow commands to participate in OT eval. No further skilled OT services indicated at this time.

## 2022-02-02 PROBLEM — E87.0 HYPERNATREMIA: Status: ACTIVE | Noted: 2022-02-02

## 2022-02-02 PROBLEM — D50.9 MICROCYTIC ANEMIA: Status: ACTIVE | Noted: 2022-02-02

## 2022-02-02 LAB
ANION GAP SERPL CALCULATED.3IONS-SCNC: 12 MMOL/L (ref 7–16)
BASOPHILS # BLD AUTO: 0.04 K/UL (ref 0–0.2)
BASOPHILS NFR BLD AUTO: 0.2 % (ref 0–1)
BUN SERPL-MCNC: 63 MG/DL (ref 7–18)
BUN/CREAT SERPL: 35 (ref 6–20)
CALCIUM SERPL-MCNC: 10.1 MG/DL (ref 8.5–10.1)
CHLORIDE SERPL-SCNC: 120 MMOL/L (ref 98–107)
CO2 SERPL-SCNC: 29 MMOL/L (ref 21–32)
CREAT SERPL-MCNC: 1.78 MG/DL (ref 0.7–1.3)
DIFFERENTIAL METHOD BLD: ABNORMAL
EOSINOPHIL # BLD AUTO: 0.13 K/UL (ref 0–0.5)
EOSINOPHIL NFR BLD AUTO: 0.6 % (ref 1–4)
ERYTHROCYTE [DISTWIDTH] IN BLOOD BY AUTOMATED COUNT: 21.9 % (ref 11.5–14.5)
GLUCOSE SERPL-MCNC: 152 MG/DL (ref 74–106)
GLUCOSE SERPL-MCNC: 170 MG/DL (ref 70–105)
GLUCOSE SERPL-MCNC: 171 MG/DL (ref 70–105)
GLUCOSE SERPL-MCNC: 176 MG/DL (ref 70–105)
GLUCOSE SERPL-MCNC: 183 MG/DL (ref 70–105)
HCT VFR BLD AUTO: 26.5 % (ref 40–54)
HGB BLD-MCNC: 8.9 G/DL (ref 13.5–18)
IMM GRANULOCYTES # BLD AUTO: 0.53 K/UL (ref 0–0.04)
IMM GRANULOCYTES NFR BLD: 2.5 % (ref 0–0.4)
LYMPHOCYTES # BLD AUTO: 0.94 K/UL (ref 1–4.8)
LYMPHOCYTES NFR BLD AUTO: 4.5 % (ref 27–41)
MCH RBC QN AUTO: 25.4 PG (ref 27–31)
MCHC RBC AUTO-ENTMCNC: 33.6 G/DL (ref 32–36)
MCV RBC AUTO: 75.7 FL (ref 80–96)
MONOCYTES # BLD AUTO: 0.9 K/UL (ref 0–0.8)
MONOCYTES NFR BLD AUTO: 4.3 % (ref 2–6)
MPC BLD CALC-MCNC: 11.8 FL (ref 9.4–12.4)
NEUTROPHILS # BLD AUTO: 18.55 K/UL (ref 1.8–7.7)
NEUTROPHILS NFR BLD AUTO: 87.9 % (ref 53–65)
NRBC # BLD AUTO: 0.09 X10E3/UL
NRBC, AUTO (.00): 0.4 %
PLATELET # BLD AUTO: 305 K/UL (ref 150–400)
POTASSIUM SERPL-SCNC: 3.2 MMOL/L (ref 3.5–5.1)
RBC # BLD AUTO: 3.5 M/UL (ref 4.6–6.2)
SODIUM SERPL-SCNC: 158 MMOL/L (ref 136–145)
WBC # BLD AUTO: 21.09 K/UL (ref 4.5–11)

## 2022-02-02 PROCEDURE — 27000221 HC OXYGEN, UP TO 24 HOURS

## 2022-02-02 PROCEDURE — 63600175 PHARM REV CODE 636 W HCPCS

## 2022-02-02 PROCEDURE — 85025 COMPLETE CBC W/AUTO DIFF WBC: CPT

## 2022-02-02 PROCEDURE — 25000003 PHARM REV CODE 250: Performed by: INTERNAL MEDICINE

## 2022-02-02 PROCEDURE — 11000001 HC ACUTE MED/SURG PRIVATE ROOM

## 2022-02-02 PROCEDURE — 63600175 PHARM REV CODE 636 W HCPCS: Performed by: INTERNAL MEDICINE

## 2022-02-02 PROCEDURE — 80048 BASIC METABOLIC PNL TOTAL CA: CPT

## 2022-02-02 PROCEDURE — 94640 AIRWAY INHALATION TREATMENT: CPT

## 2022-02-02 PROCEDURE — 25000003 PHARM REV CODE 250: Performed by: FAMILY MEDICINE

## 2022-02-02 PROCEDURE — 25000003 PHARM REV CODE 250

## 2022-02-02 PROCEDURE — 99233 SBSQ HOSP IP/OBS HIGH 50: CPT | Mod: GC,,, | Performed by: INTERNAL MEDICINE

## 2022-02-02 PROCEDURE — C9113 INJ PANTOPRAZOLE SODIUM, VIA: HCPCS

## 2022-02-02 PROCEDURE — 99233 PR SUBSEQUENT HOSPITAL CARE,LEVL III: ICD-10-PCS | Mod: GC,,, | Performed by: INTERNAL MEDICINE

## 2022-02-02 PROCEDURE — 94761 N-INVAS EAR/PLS OXIMETRY MLT: CPT

## 2022-02-02 PROCEDURE — 25000242 PHARM REV CODE 250 ALT 637 W/ HCPCS

## 2022-02-02 PROCEDURE — 82962 GLUCOSE BLOOD TEST: CPT

## 2022-02-02 PROCEDURE — 36415 COLL VENOUS BLD VENIPUNCTURE: CPT

## 2022-02-02 RX ORDER — SILVER SULFADIAZINE 10 G/1000G
CREAM TOPICAL 2 TIMES DAILY
Status: DISCONTINUED | OUTPATIENT
Start: 2022-02-02 | End: 2022-02-07 | Stop reason: HOSPADM

## 2022-02-02 RX ORDER — POTASSIUM CHLORIDE 7.45 MG/ML
10 INJECTION INTRAVENOUS ONCE
Status: COMPLETED | OUTPATIENT
Start: 2022-02-02 | End: 2022-02-02

## 2022-02-02 RX ADMIN — TRAZODONE HYDROCHLORIDE 25 MG: 50 TABLET ORAL at 10:02

## 2022-02-02 RX ADMIN — POTASSIUM CHLORIDE 10 MEQ: 7.46 INJECTION, SOLUTION INTRAVENOUS at 04:02

## 2022-02-02 RX ADMIN — MICONAZOLE NITRATE: 20 CREAM TOPICAL at 12:02

## 2022-02-02 RX ADMIN — BENZTROPINE MESYLATE 0.5 MG: 0.5 TABLET ORAL at 10:02

## 2022-02-02 RX ADMIN — POTASSIUM BICARBONATE 40 MEQ: 782 TABLET, EFFERVESCENT ORAL at 06:02

## 2022-02-02 RX ADMIN — PANTOPRAZOLE SODIUM 40 MG: 40 INJECTION, POWDER, FOR SOLUTION INTRAVENOUS at 09:02

## 2022-02-02 RX ADMIN — MICONAZOLE NITRATE: 20 CREAM TOPICAL at 10:02

## 2022-02-02 RX ADMIN — VANCOMYCIN HYDROCHLORIDE 1250 MG: 1 INJECTION, POWDER, LYOPHILIZED, FOR SOLUTION INTRAVENOUS at 06:02

## 2022-02-02 RX ADMIN — PIPERACILLIN AND TAZOBACTAM 4.5 G: 4; .5 INJECTION, POWDER, FOR SOLUTION INTRAVENOUS at 04:02

## 2022-02-02 RX ADMIN — PIPERACILLIN AND TAZOBACTAM 4.5 G: 4; .5 INJECTION, POWDER, FOR SOLUTION INTRAVENOUS at 09:02

## 2022-02-02 RX ADMIN — IPRATROPIUM BROMIDE AND ALBUTEROL SULFATE 3 ML: 2.5; .5 SOLUTION RESPIRATORY (INHALATION) at 12:02

## 2022-02-02 RX ADMIN — NYSTATIN 500000 UNITS: 500000 SUSPENSION ORAL at 12:02

## 2022-02-02 RX ADMIN — IPRATROPIUM BROMIDE AND ALBUTEROL SULFATE 3 ML: 2.5; .5 SOLUTION RESPIRATORY (INHALATION) at 04:02

## 2022-02-02 RX ADMIN — OXYCODONE HYDROCHLORIDE AND ACETAMINOPHEN 500 MG: 500 TABLET ORAL at 09:02

## 2022-02-02 RX ADMIN — POLYETHYLENE GLYCOL 3350 17 G: 17 POWDER, FOR SOLUTION ORAL at 09:02

## 2022-02-02 RX ADMIN — BENZTROPINE MESYLATE 0.5 MG: 0.5 TABLET ORAL at 09:02

## 2022-02-02 RX ADMIN — FOLIC ACID 1 MG: 1 TABLET ORAL at 09:02

## 2022-02-02 RX ADMIN — IPRATROPIUM BROMIDE AND ALBUTEROL SULFATE 3 ML: 2.5; .5 SOLUTION RESPIRATORY (INHALATION) at 07:02

## 2022-02-02 RX ADMIN — DONEPEZIL HYDROCHLORIDE 10 MG: 5 TABLET, FILM COATED ORAL at 10:02

## 2022-02-02 RX ADMIN — TRAZODONE HYDROCHLORIDE 25 MG: 50 TABLET ORAL at 09:02

## 2022-02-02 RX ADMIN — TRAZODONE HYDROCHLORIDE 25 MG: 50 TABLET ORAL at 03:02

## 2022-02-02 RX ADMIN — NYSTATIN 500000 UNITS: 500000 SUSPENSION ORAL at 03:02

## 2022-02-02 RX ADMIN — POTASSIUM BICARBONATE 40 MEQ: 782 TABLET, EFFERVESCENT ORAL at 12:02

## 2022-02-02 NOTE — ASSESSMENT & PLAN NOTE
On admission  and   - fluids and monitor CMP daily  2/2: Ordering a CMP to check liver enzymes tomorrow

## 2022-02-02 NOTE — PROGRESS NOTES
[facility] - [address] [place] [address] Medicine  Progress Note    Patient Name: Owen Solis  MRN: [id number]  Patient Class: IP- Inpatient   Admission Date: 1/29/2022  Length of Stay: 4 days  Attending Physician: Freya Grant,*  Primary Care Provider: Angelina Jaramillo II, MD        Subjective:     Principal Problem:UTI (urinary tract infection)        HPI:  Owen Solis is a 61 y.o.m. with a PMHx of HTN, Alzheimer's Dz, metabolic encephalopathy, OA, and dementia that presents to [facility] ED from [facility] for fever, tachypnea, HTN, hypoxia and AMS. In the ED UA was positive for UTI and CXR show bibasilar atelectasis/infiltrates. ED nurse stated patient came into ED with upton cath in place that contain an extensive amount of sediment. Patient was admitted to inpatient family medicine for further care and treatment. According to [facility] patient was ambulatory and could talk, although inappropriate wording, until he contracted COVID-19 in August 2021. Since then patient has been nonverbal and nonambulatory. Patient is fed through a PEG tube due to not being able to swallow. According to NH patient has had to be admitted to the hospital several times recently for PNA. NH also state patient is Full code as did patient's sister. Patient's sister was contacted via phone and she verified what the NH reported.       Overview/Hospital Course:  No notes on file    Interval History: Patient's nurse is unable to get a [place] catheter in. [facility] [place] is requiring a catheter to be placed. Patient is on day 5 of antibiotics. TB test completed. No overnight events. Patient may be able to discharge tomorrow.     Review of Systems   Unable to perform ROS: Patient nonverbal     Objective:     Vital Signs (Most Recent):  Temp: 99 °F (37.2 °C) (02/02/22 0804)  Pulse: 103 (02/02/22 0804)  Resp: 16 (02/02/22 0804)  BP: 96/61 (02/02/22 0804)  SpO2: 97 % (02/02/22 0804) Vital Signs (24h Range):  Temp:  [97.4 °F (36.3  °C)-99.8 °F (37.7 °C)] 99 °F (37.2 °C)  Pulse:  [102-107] 103  Resp:  [16-20] 16  SpO2:  [96 %-99 %] 97 %  BP: ()/(57-66) 96/61     Weight: 71.6 kg (157 lb 13.6 oz)  Body mass index is 24 kg/m².    Intake/Output Summary (Last 24 hours) at 2/2/2022 0907  Last data filed at 2/2/2022 0600  Gross per 24 hour   Intake 200 ml   Output --   Net 200 ml      Physical Exam  Vitals and nursing note reviewed.   Constitutional:       Appearance: He is not ill-appearing, toxic-appearing or diaphoretic.   HENT:      Head: Normocephalic and atraumatic.      Comments: Poor oral hygiene; oral candidiasis      Nose: Nose normal. No congestion or rhinorrhea.      Mouth/Throat:      Mouth: Mucous membranes are dry.      Pharynx: No oropharyngeal exudate.   Eyes:      Pupils: Pupils are equal, round, and reactive to light.   Cardiovascular:      Rate and Rhythm: Normal rate and regular rhythm.      Pulses: Normal pulses.      Heart sounds: Normal heart sounds. No murmur heard.  No friction rub.   Pulmonary:      Effort: Pulmonary effort is normal. No respiratory distress.      Breath sounds: Normal breath sounds. No wheezing, rhonchi or rales.   Abdominal:      Tenderness: There is no abdominal tenderness. There is no guarding.   Musculoskeletal:      Right lower leg: No edema.      Left lower leg: No edema.   Skin:     General: Skin is warm.      Capillary Refill: Capillary refill takes less than 2 seconds.      Findings: Lesion (Sacral ulcer and skin breakdown around PEG tube) present.   Neurological:      Mental Status: He is alert.   Psychiatric:         Attention and Perception: He is inattentive.         Mood and Affect: Affect is blunt.         Speech: He is noncommunicative.         Behavior: Behavior normal.         Significant Labs: All pertinent labs within the past 24 hours have been reviewed.    Significant Imaging: I have reviewed all pertinent imaging results/findings within the past 24 hours.      Assessment/Plan:       * UTI (urinary tract infection)  U/A  Positive for UTI.  - Rocephin 2g x1 dose in ED  - patient on Vancomycin and Merram  - Urine Cx grew ESBL  2/1: >100,000 yeast; Started Fluconazole IV 200mg for 14 days; likely secondary to Vancomycin and Zosyn for HCAP    Sacral wound  - Wound Care consulted - appreciate recommendations for PEG tube site and sacral ulcer   - Stage 3 Sacral Ulcer        Elevated liver enzymes  On admission  and   - fluids and monitor CMP daily      GERD (gastroesophageal reflux disease)  Protonix 40mg IV QD      Depression  Trazodone 50mg BID        Primary hypertension  Holding Metoprolol succinate 25mg QD due to HoTN      OA (osteoarthritis)  - Flexeril 10mg BID  - acetaminophen PRN     Healthcare-associated pneumonia  Patient came from [facility]; will be D/C to [place]   - Vancomycin (Day 5)   - Zosyn 4.5g Q8H (Day 5)  - Blood Cx x2 no growth   - Supplemental O2   - telemetry   - Duo nebs Q8H  - Patient on 26% oxygen on partial non-rebreather     Dysphagia  - PEG tube placed, intact  - Patient getting 1/2NS with 5% dextrose as continuous fluids   - PEG tube is feed at a rate of 40ml/hr with 125flush q2hrs      Alzheimer disease  - Donepezil 10mg QD  - Benztropine 0.5 BID  - family wants patient to be switched to [facility] after discharge, PT/OT ordered, COVID test and TB pending         VTE Risk Mitigation (From admission, onward)         Ordered     IP VTE LOW RISK PATIENT  Once         01/29/22 1428     Place sequential compression device  Until discontinued         01/29/22 1428                Discharge Planning   ESTEFANY:      Code Status: Full Code   Is the patient medically ready for discharge?:     Reason for patient still in hospital (select all that apply): Treatment  Discharge Plan A: Skilled Nursing Facility                  Judith Valdez MD  Department of Hospital Medicine   [address]

## 2022-02-02 NOTE — PROGRESS NOTES
Pharmacy Consult    Consulted to assist in the management of  Vanc  therapy.  Patient is currently receiving Vanc 1250mg iv q36hr. Trough drawn at 1630 on 2/1 reported as 9.5 mcg/ml.  Based on this lab data, will change to Vanc 1250mg Iv q24hr. Will recheck trough prior to 3rd dose of new regimen and make further adjustments as necessary.    PIETER Stark.Ph.

## 2022-02-02 NOTE — PLAN OF CARE
Spoke with Eliezer at [facility], per MD plan on d/c tomorrow. TB s/s, PPD placement, and CXR report faxed to [facility]. Packet taken to floor. Following.

## 2022-02-02 NOTE — SUBJECTIVE & OBJECTIVE
Interval History: Patient's nurse is unable to get a Ramirez catheter in. [facility] is requiring a catheter to be placed. Patient is on day 5 of antibiotics. TB test completed. No overnight events. Patient may be able to discharge tomorrow.     Review of Systems   Unable to perform ROS: Patient nonverbal     Objective:     Vital Signs (Most Recent):  Temp: 99 °F (37.2 °C) (02/02/22 0804)  Pulse: 103 (02/02/22 0804)  Resp: 16 (02/02/22 0804)  BP: 96/61 (02/02/22 0804)  SpO2: 97 % (02/02/22 0804) Vital Signs (24h Range):  Temp:  [97.4 °F (36.3 °C)-99.8 °F (37.7 °C)] 99 °F (37.2 °C)  Pulse:  [102-107] 103  Resp:  [16-20] 16  SpO2:  [96 %-99 %] 97 %  BP: ()/(57-66) 96/61     Weight: 71.6 kg (157 lb 13.6 oz)  Body mass index is 24 kg/m².    Intake/Output Summary (Last 24 hours) at 2/2/2022 0907  Last data filed at 2/2/2022 0600  Gross per 24 hour   Intake 200 ml   Output --   Net 200 ml      Physical Exam  Vitals and nursing note reviewed.   Constitutional:       Appearance: He is not ill-appearing, toxic-appearing or diaphoretic.   HENT:      Head: Normocephalic and atraumatic.      Comments: Poor oral hygiene; oral candidiasis      Nose: Nose normal. No congestion or rhinorrhea.      Mouth/Throat:      Mouth: Mucous membranes are dry.      Pharynx: No oropharyngeal exudate.   Eyes:      Pupils: Pupils are equal, round, and reactive to light.   Cardiovascular:      Rate and Rhythm: Normal rate and regular rhythm.      Pulses: Normal pulses.      Heart sounds: Normal heart sounds. No murmur heard.  No friction rub.   Pulmonary:      Effort: Pulmonary effort is normal. No respiratory distress.      Breath sounds: Normal breath sounds. No wheezing, rhonchi or rales.   Abdominal:      Tenderness: There is no abdominal tenderness. There is no guarding.   Musculoskeletal:      Right lower leg: No edema.      Left lower leg: No edema.   Skin:     General: Skin is warm.      Capillary Refill: Capillary refill takes less than  2 seconds.      Findings: Lesion (Sacral ulcer and skin breakdown around PEG tube) present.   Neurological:      Mental Status: He is alert.   Psychiatric:         Attention and Perception: He is inattentive.         Mood and Affect: Affect is blunt.         Speech: He is noncommunicative.         Behavior: Behavior normal.         Significant Labs: All pertinent labs within the past 24 hours have been reviewed.    Significant Imaging: I have reviewed all pertinent imaging results/findings within the past 24 hours.

## 2022-02-02 NOTE — ASSESSMENT & PLAN NOTE
- Na level is 158  - Increased free water flushes from 125 to 250 q2hrs   - Continue to monitor   - 4.6 L free water deficit

## 2022-02-02 NOTE — ASSESSMENT & PLAN NOTE
Patient came from [facility]; will be D/C to [place]   - Vancomycin (Day 5)   - Zosyn 4.5g Q8H (Day 5)  - Blood Cx x2 no growth   - Supplemental O2   - telemetry   - Duo nebs Q8H  - Patient on 26% oxygen on partial non-rebreather

## 2022-02-02 NOTE — ASSESSMENT & PLAN NOTE
- K: 3.2 today   - Added 10MEQ KCL IV  - K Bicarb 40 MEQ x2 given via PEG tube   - Will continue to monitor with BMP

## 2022-02-03 PROBLEM — L89.221 PRESSURE INJURY OF LEFT HIP, STAGE 1: Status: ACTIVE | Noted: 2022-02-03

## 2022-02-03 PROBLEM — L89.892: Status: ACTIVE | Noted: 2022-02-03

## 2022-02-03 PROBLEM — S31.000A SACRAL WOUND: Status: RESOLVED | Noted: 2022-02-01 | Resolved: 2022-02-03

## 2022-02-03 PROBLEM — D72.829 LEUKOCYTOSIS: Status: ACTIVE | Noted: 2022-02-03

## 2022-02-03 PROBLEM — L89.95 PRESSURE INJURY, UNSTAGEABLE, WITH SUSPECTED DEEP TISSUE INJURY: Status: ACTIVE | Noted: 2022-02-03

## 2022-02-03 LAB
ALBUMIN SERPL BCP-MCNC: 1.7 G/DL (ref 3.5–5)
ALBUMIN/GLOB SERPL: 0.4 {RATIO}
ALP SERPL-CCNC: 106 U/L (ref 45–115)
ALT SERPL W P-5'-P-CCNC: 84 U/L (ref 16–61)
ANION GAP SERPL CALCULATED.3IONS-SCNC: 9 MMOL/L (ref 7–16)
ANISOCYTOSIS BLD QL SMEAR: ABNORMAL
AST SERPL W P-5'-P-CCNC: 40 U/L (ref 15–37)
BASOPHILS # BLD AUTO: 0.04 K/UL (ref 0–0.2)
BASOPHILS NFR BLD AUTO: 0.2 % (ref 0–1)
BILIRUB SERPL-MCNC: 0.8 MG/DL (ref 0–1.2)
BUN SERPL-MCNC: 41 MG/DL (ref 7–18)
BUN/CREAT SERPL: 29 (ref 6–20)
CALCIUM SERPL-MCNC: 10.3 MG/DL (ref 8.5–10.1)
CHLORIDE SERPL-SCNC: 115 MMOL/L (ref 98–107)
CO2 SERPL-SCNC: 33 MMOL/L (ref 21–32)
CREAT SERPL-MCNC: 1.4 MG/DL (ref 0.7–1.3)
DIFFERENTIAL METHOD BLD: ABNORMAL
EOSINOPHIL # BLD AUTO: 0.29 K/UL (ref 0–0.5)
EOSINOPHIL NFR BLD AUTO: 1.3 % (ref 1–4)
ERYTHROCYTE [DISTWIDTH] IN BLOOD BY AUTOMATED COUNT: 22.4 % (ref 11.5–14.5)
GLOBULIN SER-MCNC: 4.8 G/DL (ref 2–4)
GLUCOSE SERPL-MCNC: 100 MG/DL (ref 70–105)
GLUCOSE SERPL-MCNC: 104 MG/DL (ref 70–105)
GLUCOSE SERPL-MCNC: 107 MG/DL (ref 70–105)
GLUCOSE SERPL-MCNC: 116 MG/DL (ref 74–106)
GLUCOSE SERPL-MCNC: 95 MG/DL (ref 70–105)
GLUCOSE SERPL-MCNC: 98 MG/DL (ref 70–105)
HCT VFR BLD AUTO: 27 % (ref 40–54)
HGB BLD-MCNC: 9 G/DL (ref 13.5–18)
IMM GRANULOCYTES # BLD AUTO: 0.64 K/UL (ref 0–0.04)
IMM GRANULOCYTES NFR BLD: 2.9 % (ref 0–0.4)
LYMPHOCYTES # BLD AUTO: 1.92 K/UL (ref 1–4.8)
LYMPHOCYTES NFR BLD AUTO: 8.8 % (ref 27–41)
MCH RBC QN AUTO: 25.6 PG (ref 27–31)
MCHC RBC AUTO-ENTMCNC: 33.3 G/DL (ref 32–36)
MCV RBC AUTO: 76.9 FL (ref 80–96)
MICROCYTES BLD QL SMEAR: ABNORMAL
MONOCYTES # BLD AUTO: 1.04 K/UL (ref 0–0.8)
MONOCYTES NFR BLD AUTO: 4.8 % (ref 2–6)
MPC BLD CALC-MCNC: ABNORMAL G/DL
NEUTROPHILS # BLD AUTO: 17.77 K/UL (ref 1.8–7.7)
NEUTROPHILS NFR BLD AUTO: 82 % (ref 53–65)
NRBC # BLD AUTO: 0.09 X10E3/UL
NRBC, AUTO (.00): 0.4 %
PLATELET # BLD AUTO: 250 K/UL (ref 150–400)
PLATELET MORPHOLOGY: ABNORMAL
POLYCHROMASIA BLD QL SMEAR: ABNORMAL
POTASSIUM SERPL-SCNC: 3.4 MMOL/L (ref 3.5–5.1)
PROT SERPL-MCNC: 6.5 G/DL (ref 6.4–8.2)
RBC # BLD AUTO: 3.51 M/UL (ref 4.6–6.2)
SODIUM SERPL-SCNC: 154 MMOL/L (ref 136–145)
TARGETS BLD QL SMEAR: ABNORMAL
VANCOMYCIN TROUGH SERPL-MCNC: 13.9 ΜG/ML (ref 10–20)
WBC # BLD AUTO: 21.7 K/UL (ref 4.5–11)

## 2022-02-03 PROCEDURE — C9113 INJ PANTOPRAZOLE SODIUM, VIA: HCPCS

## 2022-02-03 PROCEDURE — 94761 N-INVAS EAR/PLS OXIMETRY MLT: CPT

## 2022-02-03 PROCEDURE — 36415 COLL VENOUS BLD VENIPUNCTURE: CPT

## 2022-02-03 PROCEDURE — 94640 AIRWAY INHALATION TREATMENT: CPT

## 2022-02-03 PROCEDURE — 99900035 HC TECH TIME PER 15 MIN (STAT)

## 2022-02-03 PROCEDURE — 99233 SBSQ HOSP IP/OBS HIGH 50: CPT | Mod: GC,,, | Performed by: INTERNAL MEDICINE

## 2022-02-03 PROCEDURE — 99233 PR SUBSEQUENT HOSPITAL CARE,LEVL III: ICD-10-PCS | Mod: GC,,, | Performed by: INTERNAL MEDICINE

## 2022-02-03 PROCEDURE — 25000003 PHARM REV CODE 250

## 2022-02-03 PROCEDURE — 25000003 PHARM REV CODE 250: Performed by: INTERNAL MEDICINE

## 2022-02-03 PROCEDURE — 82962 GLUCOSE BLOOD TEST: CPT

## 2022-02-03 PROCEDURE — 80202 ASSAY OF VANCOMYCIN: CPT | Performed by: INTERNAL MEDICINE

## 2022-02-03 PROCEDURE — 25000242 PHARM REV CODE 250 ALT 637 W/ HCPCS

## 2022-02-03 PROCEDURE — 85025 COMPLETE CBC W/AUTO DIFF WBC: CPT

## 2022-02-03 PROCEDURE — 11000001 HC ACUTE MED/SURG PRIVATE ROOM

## 2022-02-03 PROCEDURE — 63600175 PHARM REV CODE 636 W HCPCS

## 2022-02-03 PROCEDURE — 27000221 HC OXYGEN, UP TO 24 HOURS

## 2022-02-03 PROCEDURE — 63700000 PHARM REV CODE 250 ALT 637 W/O HCPCS

## 2022-02-03 PROCEDURE — 80053 COMPREHEN METABOLIC PANEL: CPT

## 2022-02-03 PROCEDURE — 63600175 PHARM REV CODE 636 W HCPCS: Performed by: INTERNAL MEDICINE

## 2022-02-03 RX ORDER — FLUCONAZOLE 100 MG/1
200 TABLET ORAL DAILY
Status: DISCONTINUED | OUTPATIENT
Start: 2022-02-03 | End: 2022-02-03

## 2022-02-03 RX ORDER — FLUCONAZOLE 100 MG/1
200 TABLET ORAL DAILY
Status: DISCONTINUED | OUTPATIENT
Start: 2022-02-04 | End: 2022-02-07 | Stop reason: HOSPADM

## 2022-02-03 RX ADMIN — SILVER SULFADIAZINE: 10 CREAM TOPICAL at 09:02

## 2022-02-03 RX ADMIN — VANCOMYCIN HYDROCHLORIDE 1250 MG: 1 INJECTION, POWDER, LYOPHILIZED, FOR SOLUTION INTRAVENOUS at 06:02

## 2022-02-03 RX ADMIN — FOLIC ACID 1 MG: 1 TABLET ORAL at 09:02

## 2022-02-03 RX ADMIN — IPRATROPIUM BROMIDE AND ALBUTEROL SULFATE 3 ML: 2.5; .5 SOLUTION RESPIRATORY (INHALATION) at 08:02

## 2022-02-03 RX ADMIN — PIPERACILLIN AND TAZOBACTAM 4.5 G: 4; .5 INJECTION, POWDER, FOR SOLUTION INTRAVENOUS at 02:02

## 2022-02-03 RX ADMIN — PIPERACILLIN AND TAZOBACTAM 4.5 G: 4; .5 INJECTION, POWDER, FOR SOLUTION INTRAVENOUS at 04:02

## 2022-02-03 RX ADMIN — PIPERACILLIN AND TAZOBACTAM 4.5 G: 4; .5 INJECTION, POWDER, FOR SOLUTION INTRAVENOUS at 09:02

## 2022-02-03 RX ADMIN — ACETAMINOPHEN 650 MG: 325 TABLET ORAL at 09:02

## 2022-02-03 RX ADMIN — MICONAZOLE NITRATE: 20 CREAM TOPICAL at 09:02

## 2022-02-03 RX ADMIN — MULTIVITAMIN 15 ML: LIQUID ORAL at 10:02

## 2022-02-03 RX ADMIN — FLUCONAZOLE 200 MG: 100 TABLET ORAL at 04:02

## 2022-02-03 RX ADMIN — BENZTROPINE MESYLATE 0.5 MG: 0.5 TABLET ORAL at 09:02

## 2022-02-03 RX ADMIN — IPRATROPIUM BROMIDE AND ALBUTEROL SULFATE 3 ML: 2.5; .5 SOLUTION RESPIRATORY (INHALATION) at 03:02

## 2022-02-03 RX ADMIN — DONEPEZIL HYDROCHLORIDE 10 MG: 5 TABLET, FILM COATED ORAL at 09:02

## 2022-02-03 RX ADMIN — TRAZODONE HYDROCHLORIDE 25 MG: 50 TABLET ORAL at 09:02

## 2022-02-03 RX ADMIN — IPRATROPIUM BROMIDE AND ALBUTEROL SULFATE 3 ML: 2.5; .5 SOLUTION RESPIRATORY (INHALATION) at 12:02

## 2022-02-03 RX ADMIN — POLYETHYLENE GLYCOL 3350 17 G: 17 POWDER, FOR SOLUTION ORAL at 09:02

## 2022-02-03 RX ADMIN — TRAZODONE HYDROCHLORIDE 25 MG: 50 TABLET ORAL at 04:02

## 2022-02-03 RX ADMIN — PANTOPRAZOLE SODIUM 40 MG: 40 INJECTION, POWDER, FOR SOLUTION INTRAVENOUS at 10:02

## 2022-02-03 NOTE — SUBJECTIVE & OBJECTIVE
Interval History: The patient is laying in bed. He does have a cough. He did open his eyes this morning when I was completing my physical exam. However, he has a leukocytosis and a temperature of 100F this morning. CXR was done yesterday that showed no significant changes. The patient has been treated for a UTI. Blood cultures have been negative for 72 hours. Wound culture of the sacral wound pending. No overnight events.     Review of Systems   Unable to perform ROS: Patient nonverbal     Objective:     Vital Signs (Most Recent):  Temp: 99.5 °F (37.5 °C) (02/03/22 0725)  Pulse: 96 (02/03/22 0803)  Resp: 20 (02/03/22 0803)  BP: (!) 92/58 (02/03/22 0725)  SpO2: 98 % (02/03/22 0803) Vital Signs (24h Range):  Temp:  [97.6 °F (36.4 °C)-100 °F (37.8 °C)] 99.5 °F (37.5 °C)  Pulse:  [] 96  Resp:  [18-21] 20  SpO2:  [95 %-100 %] 98 %  BP: ()/(54-66) 92/58     Weight: 71.6 kg (157 lb 13.6 oz)  Body mass index is 24 kg/m².  No intake or output data in the 24 hours ending 02/03/22 0842   Physical Exam  Vitals and nursing note reviewed.   Constitutional:       Appearance: He is not ill-appearing, toxic-appearing or diaphoretic.   HENT:      Head: Normocephalic and atraumatic.      Comments: Poor oral hygiene; oral candidiasis      Nose: Nose normal. No congestion or rhinorrhea.      Mouth/Throat:      Pharynx: No oropharyngeal exudate.   Eyes:      Pupils: Pupils are equal, round, and reactive to light.   Cardiovascular:      Rate and Rhythm: Normal rate and regular rhythm.      Pulses: Normal pulses.      Heart sounds: Normal heart sounds. No murmur heard.  No friction rub.   Pulmonary:      Effort: Pulmonary effort is normal. No respiratory distress.      Breath sounds: Normal breath sounds. No wheezing, rhonchi or rales.   Abdominal:      Tenderness: There is no abdominal tenderness. There is no guarding.   Musculoskeletal:      Right lower leg: No edema.      Left lower leg: No edema.   Skin:     General: Skin  is warm.      Capillary Refill: Capillary refill takes less than 2 seconds.      Findings: Lesion (Sacral ulcer and skin breakdown around PEG tube; LT hip skin breakdown ) present.   Neurological:      Mental Status: He is alert.   Psychiatric:         Attention and Perception: He is inattentive.         Mood and Affect: Affect is blunt.         Speech: He is noncommunicative.         Behavior: Behavior normal.         Significant Labs: All pertinent labs within the past 24 hours have been reviewed.    Significant Imaging: I have reviewed all pertinent imaging results/findings within the past 24 hours.

## 2022-02-03 NOTE — ASSESSMENT & PLAN NOTE
Patient came from [facility]; will be D/C to [place]   - Vancomycin (Day 7)   - Zosyn 4.5g Q8H (Day 7)  - Blood Cx x2 no growth   - Supplemental O2   - telemetry   - Duo nebs Q8H  - Patient on NC 3L  2/4: WBC is 21.70 today; continues to remain elevated. No overnight elevated temperature.

## 2022-02-03 NOTE — ASSESSMENT & PLAN NOTE
- WBC count today is 21.70; up from 21.09  - Patient continues to be on Vancomycin and Zosyn   - Temperature last night of 100F  - Blood cultures negative for 72 hours   - CXR: no significant changes yesterday  - Treated for UTI   - Wound culture of sacral wound pending for source of infection

## 2022-02-03 NOTE — PROGRESS NOTES
[address] Medicine  Progress Note    Patient Name: Owen Solis  MRN: [id number]  Patient Class: IP- Inpatient   Admission Date: 1/29/2022  Length of Stay: 5 days  Attending Physician: Freya Grant,*  Primary Care Provider: Angelina Jaramillo II, MD        Subjective:     Principal Problem:UTI (urinary tract infection)        HPI:  Owen Solis is a 61 y.o.m. with a PMHx of HTN, Alzheimer's Dz, metabolic encephalopathy, OA, and dementia that presents to [facility] ED from [facility] for fever, tachypnea, HTN, hypoxia and AMS. In the ED UA was positive for UTI and CXR show bibasilar atelectasis/infiltrates. ED nurse stated patient came into ED with upton cath in place that contain an extensive amount of sediment. Patient was admitted to inpatient family medicine for further care and treatment. According to [facility] patient was ambulatory and could talk, although inappropriate wording, until he contracted COVID-19 in August 2021. Since then patient has been nonverbal and nonambulatory. Patient is fed through a PEG tube due to not being able to swallow. According to NH patient has had to be admitted to the hospital several times recently for PNA. NH also state patient is Full code as did patient's sister. Patient's sister was contacted via phone and she verified what the NH reported.       Overview/Hospital Course:  No notes on file    Interval History: The patient is laying in bed. He does have a cough. He did open his eyes this morning when I was completing my physical exam. However, he has a leukocytosis and a temperature of 100F this morning. CXR was done yesterday that showed no significant changes. The patient has been treated for a UTI. Blood cultures have been negative for 72 hours. Wound culture of the sacral wound pending. No overnight events.     Review of Systems   Unable to perform ROS: Patient nonverbal     Objective:     Vital Signs (Most Recent):  Temp:  99.5 °F (37.5 °C) (02/03/22 0725)  Pulse: 96 (02/03/22 0803)  Resp: 20 (02/03/22 0803)  BP: (!) 92/58 (02/03/22 0725)  SpO2: 98 % (02/03/22 0803) Vital Signs (24h Range):  Temp:  [97.6 °F (36.4 °C)-100 °F (37.8 °C)] 99.5 °F (37.5 °C)  Pulse:  [] 96  Resp:  [18-21] 20  SpO2:  [95 %-100 %] 98 %  BP: ()/(54-66) 92/58     Weight: 71.6 kg (157 lb 13.6 oz)  Body mass index is 24 kg/m².  No intake or output data in the 24 hours ending 02/03/22 0842   Physical Exam  Vitals and nursing note reviewed.   Constitutional:       Appearance: He is not ill-appearing, toxic-appearing or diaphoretic.   HENT:      Head: Normocephalic and atraumatic.      Comments: Poor oral hygiene; oral candidiasis      Nose: Nose normal. No congestion or rhinorrhea.      Mouth/Throat:      Pharynx: No oropharyngeal exudate.   Eyes:      Pupils: Pupils are equal, round, and reactive to light.   Cardiovascular:      Rate and Rhythm: Normal rate and regular rhythm.      Pulses: Normal pulses.      Heart sounds: Normal heart sounds. No murmur heard.  No friction rub.   Pulmonary:      Effort: Pulmonary effort is normal. No respiratory distress.      Breath sounds: Normal breath sounds. No wheezing, rhonchi or rales.   Abdominal:      Tenderness: There is no abdominal tenderness. There is no guarding.   Musculoskeletal:      Right lower leg: No edema.      Left lower leg: No edema.   Skin:     General: Skin is warm.      Capillary Refill: Capillary refill takes less than 2 seconds.      Findings: Lesion (Sacral ulcer and skin breakdown around PEG tube; LT hip skin breakdown ) present.   Neurological:      Mental Status: He is alert.   Psychiatric:         Attention and Perception: He is inattentive.         Mood and Affect: Affect is blunt.         Speech: He is noncommunicative.         Behavior: Behavior normal.         Significant Labs: All pertinent labs within the past 24 hours have been reviewed.    Significant Imaging: I have reviewed  all pertinent imaging results/findings within the past 24 hours.      Assessment/Plan:      * UTI (urinary tract infection)  U/A  Positive for UTI.  - Rocephin 2g x1 dose in ED  - patient on Vancomycin and [place]am  - Urine Cx grew ESBL  2/1: >100,000 yeast; Started Fluconazole IV 200mg for 14 days; likely secondary to Vancomycin and Zosyn for HCAP    Dehydration  - Continue to monitor BMP   - Increase free water flushes via PEG tube     Healthcare-associated pneumonia  Patient came from [facility]; will be D/C to [facility]   - Vancomycin (Day 5)   - Zosyn 4.5g Q8H (Day 5)  - Blood Cx x2 no growth   - Supplemental O2   - telemetry   - Duo nebs Q8H  - Patient on 26% oxygen on partial non-rebreather     Hypokalemia  - K: 3.2 today   - Added 10MEQ KCL IV  - K Bicarb 40 MEQ x2 given via PEG tube   - Will continue to monitor with BMP       Dysphagia  - PEG tube placed, intact  - Patient getting 1/2NS with 5% dextrose as continuous fluids   - PEG tube is feed at a rate of 40ml/hr with 125flush q2hrs      Hypernatremia  - Na level is 158  - Increased free water flushes from 125 to 250 q2hrs   - Continue to monitor   - 4.6 L free water deficit       Alzheimer disease  - Donepezil 10mg QD  - Benztropine 0.5 BID  - family wants patient to be switched to [facility] after discharge, PT/OT ordered, COVID test and TB pending       Primary hypertension  Holding Metoprolol succinate 25mg QD due to HoTN      OA (osteoarthritis)  - Flexeril 10mg BID  - acetaminophen PRN     Depression  Trazodone 50mg BID        GERD (gastroesophageal reflux disease)  Protonix 40mg IV QD      Elevated liver enzymes  On admission  and   - fluids and monitor CMP daily  2/2: Ordering a CMP to check liver enzymes tomorrow     Leukocytosis  - WBC count today is 21.70; up from 21.09  - Patient continues to be on Vancomycin and Zosyn   - Temperature last night of 100F  - Blood cultures negative for 72 hours   - CXR: no significant  changes yesterday  - Treated for UTI   - Wound culture of sacral wound pending for source of infection       Pressure ulcer of left leg, stage 2  - Wound Care consulted - appreciate recommendations       Pressure injury of left hip, stage 1  - Wound care consulted - appreciate recommendations       Pressure injury, unstageable, with suspected deep tissue injury  - Wound Care consulted - appreciate recommendations       Microcytic anemia  H/H: 8.9/26.5  - MCV: 75.7          VTE Risk Mitigation (From admission, onward)         Ordered     IP VTE LOW RISK PATIENT  Once         01/29/22 1428     Place sequential compression device  Until discontinued         01/29/22 1428                Discharge Planning   ESTEFANY:      Code Status: Full Code   Is the patient medically ready for discharge?:     Reason for patient still in hospital (select all that apply): Treatment  Discharge Plan A: Skilled Nursing Facility                  Judith Valdez MD  Department of Hospital Medicine   [address]

## 2022-02-03 NOTE — PLAN OF CARE
Per MD pt with fever this morning, not ready for d/c. Will re-evaluate for possible d/c tomorrow. SS following.

## 2022-02-03 NOTE — ASSESSMENT & PLAN NOTE
- Wound Care consulted - appreciate recommendations   - Eschar noted on the sacrum; are not pursuing general surgery consult;

## 2022-02-03 NOTE — PROGRESS NOTES
[address]  Wound Care    Patient Name:  Owen Solis   MRN:  [id number]  Date: 2/2/2022  Diagnosis: UTI (urinary tract infection)    History:     Past Medical History:   Diagnosis Date    Alzheimer disease     Alzheimer's dementia     Arthritis     Hypertension     Metabolic encephalopathy        Social History     Socioeconomic History    Marital status: Single   Tobacco Use    Smoking status: Never Smoker    Smokeless tobacco: Never Used   Substance and Sexual Activity    Alcohol use: Not Currently    Drug use: Never    Sexual activity: Not Currently       Precautions:     Allergies as of 01/29/2022 - Reviewed 01/29/2022   Allergen Reaction Noted    Amitriptyline  08/26/2021       WOC Assessment Details/Treatment        02/02/22 2005   WOCN Assessment   WOCN Total Time (mins) 90   Visit Date 02/02/22   Visit Time 2005   Consult Type New   WOCN Speciality Wound   Wound pressure   Number of Wounds 3   Intervention assessed;changed;applied;chart review;team conference;coordination of care;orders   Skin Interventions   Device Skin Pressure Protection absorbent pad utilized/changed;positioning supports utilized;pressure points protected;skin-to-device areas padded   Pressure Reduction Devices foam padding utilized;heel offloading device utilized;positioning supports utilized;pressure-redistributing mattress utilized   Pressure Reduction Techniques heels elevated off bed;positioned off wounds;pressure points protected;weight shift assistance provided   Skin Protection adhesive use limited;incontinence pads utilized;pectin skin barriers applied;protective footwear used;silicone foam dressing in place;skin sealant/moisture barrier applied;skin-to-device areas padded;tubing/devices free from skin contact   Positioning   Body Position turned;left;30 degrees;side-lying   Head of Bed (HOB) Positioning HOB at 30 degrees   Positioning/Transfer Devices pillows;removed;applied   Pressure Injury  Prevention    Check Moisture Management Pad Done   Sacral Foam Dressing Replace   Elevate Heels Technique Heel boot   Heel protection technique Heel boot   Check Medical Devices Done        Altered Skin Integrity 02/02/22 1943 Left lateral Greater trochanter #1 Intact skin with non-blanchable redness of localized area   Date First Assessed/Time First Assessed: 02/02/22 1943   Altered Skin Integrity Present on Admission: yes  Side: Left  Orientation: lateral  Location: Greater trochanter  Wound Number: #1  Description of Altered Skin Integrity: Intact skin with non-bl...   Description of Altered Skin Integrity Intact skin with non-blanchable redness of localized area   Dressing Appearance Dry;Intact;Clean   Drainage Amount None   Appearance Red;Intact   Tissue loss description Not applicable   Periwound Area Intact;Dry   Wound Edges Defined   Dressing Reinforced;Foam   Periwound Care Absorptive dressing applied   Dressing Change Due 02/03/22        Altered Skin Integrity 02/02/22 1943 Left lateral Thigh #2 Partial thickness tissue loss. Shallow open ulcer with a red or pink wound bed, without slough. Intact or Open/Ruptured Serum-filled blister.   Date First Assessed/Time First Assessed: 02/02/22 1943   Altered Skin Integrity Present on Admission: yes  Side: Left  Orientation: lateral  Location: Thigh  Wound Number: #2  Description of Altered Skin Integrity: Partial thickness tissue loss. Shall...   Wound Image    Description of Altered Skin Integrity Partial thickness tissue loss. Shallow open ulcer with a red or pink wound bed, without slough. Intact or Open/Ruptured Serum-filled blister.   Dressing Appearance Moist drainage   Drainage Amount Small   Drainage Characteristics/Odor Yellow;Serous;No odor   Appearance Pink;Moist   Tissue loss description Partial thickness   Periwound Area Intact;Dry   Wound Edges Defined;Open   Wound Length (cm) 2.5 cm   Wound Width (cm) 1.5 cm   Wound Depth (cm) 0.1 cm   Wound Volume  (cm^3) 0.375 cm^3   Wound Surface Area (cm^2) 3.75 cm^2   Care Cleansed with:;Wound cleanser   Dressing Reinforced;Foam   Periwound Care Absorptive dressing applied   Dressing Change Due 02/03/22        Altered Skin Integrity 02/02/22 1944 Left medial;lateral Sacral spine #3 Full thickness tissue loss. Base is covered by slough and/or eschar in the wound bed   Date First Assessed/Time First Assessed: 02/02/22 1944   Altered Skin Integrity Present on Admission: yes  Side: Left  Orientation: medial;lateral  Location: Sacral spine  Wound Number: #3  Description of Altered Skin Integrity: (c) Full thickness tis...   Wound Image    Description of Altered Skin Integrity Full thickness tissue loss. Base is covered by slough and/or eschar in the wound bed  (with suspected deep tissue injury)   Dressing Appearance Moist drainage   Drainage Amount Small   Drainage Characteristics/Odor Serosanguineous   Appearance Red;Maroon;Pink;Purple;Yellow;Slough;Necrotic;Dry;Moist;Not granulating;Adipose   Tissue loss description Full thickness   Black (%), Wound Tissue Color 50 %  (50% Purple/blackened)   Red (%), Wound Tissue Color 30 %   Yellow (%), Wound Tissue Color 20 %   Periwound Area Denuded   Wound Edges Defined;Open;Irregular   Wound Length (cm) 8.5 cm   Wound Width (cm) 8.5 cm   Wound Depth (cm) 0.1 cm   Wound Volume (cm^3) 7.225 cm^3   Wound Surface Area (cm^2) 72.25 cm^2   Care Cleansed with:;Wound cleanser   Dressing Applied;Hydrofiber;Foam   Dressing Change Due 02/03/22     Patient with preexisting pressure injuries:    Sacrum: unstageable with suspected deep tissue injury  Left trochanter/hip: stage 1  Left lateral leg: stage 2    See above assessment findings.    Recommendations:    Left Lateral thigh stage 2 PI: Q3D -   1. Cleanse with wound cleanser.  2. Periwound: pat dry. Spray no sting barrier spray and allow to air dry  3. Apply aquacel advantage ag hydrofiber.  4. Cover with foam dressing.    Sacral Unstageable  PI: BID  1. Cleanse with wound cleanser  2. Periwound: pat dry. Spray no sting barrier spray and allow to air dry.  3. Apply silver sulfadiazine to wound bed nickel thick  4. Cover with dry gauze and ABD pad. Secure with paper tape.  Label with date, time & initials. Do NOT use bordered foam/MEPILEX/AQUACEL as top dressing - use ABD pad.    Left Trochanter Stage 1 PI: Q5D  1. Remove previous foam dressing. Perform daily bath.  2. Dry skin well and apply new bordered foam dressing. Label with date, time and initials.  3. Turn patient q2h - AVOID DIRECT PLACEMENT TO THIS AREA.  4. Maintain on waffle overlay and check inflation qshift.    patient would benefit from wound care outpatient f/u d/t the sacrum being unstageable and may need debridements.  Patient would also benefit from an alternating low air loss mattress if available at [facility].  Nursing staff reports 4-5 liquid Bm's per day - patient may need some Imodium, Reglan or change in tube feeding as the periwound is denuded and stool is interferring with dressings and will cause worsening of wound bed. Or discussion for possible diverting ostomy may be needed in the future with patient's NOK/POA.    Recommendations made to primary team for above. Orders placed. I will f/u again next week if still IP.    02/02/2022

## 2022-02-04 LAB
BACTERIA BLD CULT: NORMAL
BACTERIA BLD CULT: NORMAL
BASOPHILS # BLD AUTO: 0.02 K/UL (ref 0–0.2)
BASOPHILS NFR BLD AUTO: 0.1 % (ref 0–1)
DIFFERENTIAL METHOD BLD: ABNORMAL
EOSINOPHIL # BLD AUTO: 0.31 K/UL (ref 0–0.5)
EOSINOPHIL NFR BLD AUTO: 2 % (ref 1–4)
ERYTHROCYTE [DISTWIDTH] IN BLOOD BY AUTOMATED COUNT: 22.3 % (ref 11.5–14.5)
GLUCOSE SERPL-MCNC: 118 MG/DL (ref 70–105)
GLUCOSE SERPL-MCNC: 93 MG/DL (ref 70–105)
GLUCOSE SERPL-MCNC: 96 MG/DL (ref 70–105)
GLUCOSE SERPL-MCNC: 99 MG/DL (ref 70–105)
HCT VFR BLD AUTO: 24.5 % (ref 40–54)
HGB BLD-MCNC: 7.8 G/DL (ref 13.5–18)
HYPOCHROMIA BLD QL SMEAR: ABNORMAL
IMM GRANULOCYTES # BLD AUTO: 0.27 K/UL (ref 0–0.04)
IMM GRANULOCYTES NFR BLD: 1.7 % (ref 0–0.4)
LYMPHOCYTES # BLD AUTO: 0.98 K/UL (ref 1–4.8)
LYMPHOCYTES NFR BLD AUTO: 6.3 % (ref 27–41)
MAGNESIUM SERPL-MCNC: 2.4 MG/DL (ref 1.7–2.3)
MCH RBC QN AUTO: 25.6 PG (ref 27–31)
MCHC RBC AUTO-ENTMCNC: 31.8 G/DL (ref 32–36)
MCV RBC AUTO: 80.3 FL (ref 80–96)
MONOCYTES # BLD AUTO: 0.48 K/UL (ref 0–0.8)
MONOCYTES NFR BLD AUTO: 3.1 % (ref 2–6)
MPC BLD CALC-MCNC: 11.9 FL (ref 9.4–12.4)
NEUTROPHILS # BLD AUTO: 13.45 K/UL (ref 1.8–7.7)
NEUTROPHILS NFR BLD AUTO: 86.8 % (ref 53–65)
NRBC # BLD AUTO: 0.02 X10E3/UL
NRBC, AUTO (.00): 0.1 %
PLATELET # BLD AUTO: 344 K/UL (ref 150–400)
PLATELET MORPHOLOGY: ABNORMAL
POLYCHROMASIA BLD QL SMEAR: ABNORMAL
RBC # BLD AUTO: 3.05 M/UL (ref 4.6–6.2)
TARGETS BLD QL SMEAR: ABNORMAL
WBC # BLD AUTO: 15.51 K/UL (ref 4.5–11)

## 2022-02-04 PROCEDURE — 25000003 PHARM REV CODE 250

## 2022-02-04 PROCEDURE — 11000001 HC ACUTE MED/SURG PRIVATE ROOM

## 2022-02-04 PROCEDURE — 85025 COMPLETE CBC W/AUTO DIFF WBC: CPT

## 2022-02-04 PROCEDURE — 63600175 PHARM REV CODE 636 W HCPCS

## 2022-02-04 PROCEDURE — C9113 INJ PANTOPRAZOLE SODIUM, VIA: HCPCS

## 2022-02-04 PROCEDURE — 27000221 HC OXYGEN, UP TO 24 HOURS

## 2022-02-04 PROCEDURE — 99900035 HC TECH TIME PER 15 MIN (STAT)

## 2022-02-04 PROCEDURE — 63600175 PHARM REV CODE 636 W HCPCS: Performed by: INTERNAL MEDICINE

## 2022-02-04 PROCEDURE — 82962 GLUCOSE BLOOD TEST: CPT

## 2022-02-04 PROCEDURE — 25000003 PHARM REV CODE 250: Performed by: FAMILY MEDICINE

## 2022-02-04 PROCEDURE — 99232 PR SUBSEQUENT HOSPITAL CARE,LEVL II: ICD-10-PCS | Mod: GC,,, | Performed by: INTERNAL MEDICINE

## 2022-02-04 PROCEDURE — 83735 ASSAY OF MAGNESIUM: CPT

## 2022-02-04 PROCEDURE — 25000003 PHARM REV CODE 250: Performed by: INTERNAL MEDICINE

## 2022-02-04 PROCEDURE — 99232 SBSQ HOSP IP/OBS MODERATE 35: CPT | Mod: GC,,, | Performed by: INTERNAL MEDICINE

## 2022-02-04 PROCEDURE — 36415 COLL VENOUS BLD VENIPUNCTURE: CPT

## 2022-02-04 PROCEDURE — 25000242 PHARM REV CODE 250 ALT 637 W/ HCPCS

## 2022-02-04 PROCEDURE — 94761 N-INVAS EAR/PLS OXIMETRY MLT: CPT

## 2022-02-04 PROCEDURE — 63700000 PHARM REV CODE 250 ALT 637 W/O HCPCS: Performed by: INTERNAL MEDICINE

## 2022-02-04 PROCEDURE — 94640 AIRWAY INHALATION TREATMENT: CPT

## 2022-02-04 RX ADMIN — POLYETHYLENE GLYCOL 3350 17 G: 17 POWDER, FOR SOLUTION ORAL at 09:02

## 2022-02-04 RX ADMIN — IPRATROPIUM BROMIDE AND ALBUTEROL SULFATE 3 ML: 2.5; .5 SOLUTION RESPIRATORY (INHALATION) at 03:02

## 2022-02-04 RX ADMIN — TRAZODONE HYDROCHLORIDE 25 MG: 50 TABLET ORAL at 08:02

## 2022-02-04 RX ADMIN — VANCOMYCIN HYDROCHLORIDE 1250 MG: 1 INJECTION, POWDER, LYOPHILIZED, FOR SOLUTION INTRAVENOUS at 06:02

## 2022-02-04 RX ADMIN — IPRATROPIUM BROMIDE AND ALBUTEROL SULFATE 3 ML: 2.5; .5 SOLUTION RESPIRATORY (INHALATION) at 11:02

## 2022-02-04 RX ADMIN — FLUCONAZOLE 200 MG: 100 TABLET ORAL at 09:02

## 2022-02-04 RX ADMIN — MULTIVITAMIN 15 ML: LIQUID ORAL at 09:02

## 2022-02-04 RX ADMIN — IPRATROPIUM BROMIDE AND ALBUTEROL SULFATE 3 ML: 2.5; .5 SOLUTION RESPIRATORY (INHALATION) at 12:02

## 2022-02-04 RX ADMIN — BENZTROPINE MESYLATE 0.5 MG: 0.5 TABLET ORAL at 09:02

## 2022-02-04 RX ADMIN — BENZTROPINE MESYLATE 0.5 MG: 0.5 TABLET ORAL at 08:02

## 2022-02-04 RX ADMIN — TRAZODONE HYDROCHLORIDE 25 MG: 50 TABLET ORAL at 09:02

## 2022-02-04 RX ADMIN — PANTOPRAZOLE SODIUM 40 MG: 40 INJECTION, POWDER, FOR SOLUTION INTRAVENOUS at 10:02

## 2022-02-04 RX ADMIN — IPRATROPIUM BROMIDE AND ALBUTEROL SULFATE 3 ML: 2.5; .5 SOLUTION RESPIRATORY (INHALATION) at 07:02

## 2022-02-04 RX ADMIN — SILVER SULFADIAZINE: 10 CREAM TOPICAL at 10:02

## 2022-02-04 RX ADMIN — DONEPEZIL HYDROCHLORIDE 10 MG: 5 TABLET, FILM COATED ORAL at 08:02

## 2022-02-04 RX ADMIN — MICONAZOLE NITRATE: 20 CREAM TOPICAL at 09:02

## 2022-02-04 RX ADMIN — POTASSIUM BICARBONATE 40 MEQ: 782 TABLET, EFFERVESCENT ORAL at 05:02

## 2022-02-04 RX ADMIN — TRAZODONE HYDROCHLORIDE 25 MG: 50 TABLET ORAL at 03:02

## 2022-02-04 RX ADMIN — PIPERACILLIN AND TAZOBACTAM 4.5 G: 4; .5 INJECTION, POWDER, FOR SOLUTION INTRAVENOUS at 06:02

## 2022-02-04 RX ADMIN — FOLIC ACID 1 MG: 1 TABLET ORAL at 09:02

## 2022-02-04 RX ADMIN — PIPERACILLIN AND TAZOBACTAM 4.5 G: 4; .5 INJECTION, POWDER, FOR SOLUTION INTRAVENOUS at 09:02

## 2022-02-04 RX ADMIN — PIPERACILLIN AND TAZOBACTAM 4.5 G: 4; .5 INJECTION, POWDER, FOR SOLUTION INTRAVENOUS at 12:02

## 2022-02-04 NOTE — ASSESSMENT & PLAN NOTE
On admission  and ; Alk Phos 112  - fluids and monitor CMP daily  2/2: Ordering a CMP to check liver enzymes tomorrow   2/4: ALT 84, AST40, Alk Phos is 106 (Have trended down since initial CMP)

## 2022-02-04 NOTE — ASSESSMENT & PLAN NOTE
- WBC count today is 21.70; up from 21.09  - Patient continues to be on Vancomycin and Zosyn   - Blood cultures negative for 72 hours   - CXR: no significant changes   - Treated for UTI      This time I called patient and his home number and his cell phone and only reached voice mail messages. I left message again. I will try one final time tomorrow .    Babar Mckinney MD

## 2022-02-04 NOTE — SUBJECTIVE & OBJECTIVE
Interval History: Patient is more alert today as far as looking at me when I enter the room. Patient is still non-verbal. He continues to be hypernatremic and we have increased free water flushes to 350cc q 2hrs from 250cc q2 hrs. Correcting potassium levels. WBC continues to be elevated. No elevated temp overnight. Continues to be on Diflucan.     Review of Systems   Unable to perform ROS: Patient nonverbal     Objective:     Vital Signs (Most Recent):  Temp: 97.3 °F (36.3 °C) (02/04/22 0746)  Pulse: 99 (02/04/22 0746)  Resp: 16 (02/04/22 0746)  BP: 112/77 (02/04/22 0746)  SpO2: 96 % (02/04/22 0746) Vital Signs (24h Range):  Temp:  [97.3 °F (36.3 °C)-98.6 °F (37 °C)] 97.3 °F (36.3 °C)  Pulse:  [] 99  Resp:  [16-20] 16  SpO2:  [90 %-100 %] 96 %  BP: ()/(56-77) 112/77     Weight: 71.6 kg (157 lb 13.6 oz)  Body mass index is 24 kg/m².  No intake or output data in the 24 hours ending 02/04/22 0915   Physical Exam  Vitals and nursing note reviewed.   Constitutional:       Appearance: He is not ill-appearing, toxic-appearing or diaphoretic.   HENT:      Head: Normocephalic and atraumatic.      Comments: Poor oral hygiene     Nose: Nose normal. No congestion or rhinorrhea.      Mouth/Throat:      Pharynx: No oropharyngeal exudate.   Eyes:      Pupils: Pupils are equal, round, and reactive to light.   Cardiovascular:      Rate and Rhythm: Normal rate and regular rhythm.      Pulses: Normal pulses.      Heart sounds: Normal heart sounds. No murmur heard.  No friction rub.   Pulmonary:      Effort: Pulmonary effort is normal. No respiratory distress.      Breath sounds: Normal breath sounds. No wheezing, rhonchi or rales.   Abdominal:      Tenderness: There is no abdominal tenderness. There is no guarding.   Musculoskeletal:      Right lower leg: No edema.      Left lower leg: No edema.   Skin:     General: Skin is warm.      Capillary Refill: Capillary refill takes less than 2 seconds.      Findings: Lesion  (Sacral ulcer and skin breakdown around PEG tube; LT hip skin breakdown ) present.   Neurological:      Mental Status: He is alert.   Psychiatric:         Attention and Perception: He is inattentive.         Mood and Affect: Affect is blunt.         Speech: He is noncommunicative.         Behavior: Behavior normal.         Significant Labs: All pertinent labs within the past 24 hours have been reviewed.    Significant Imaging: I have reviewed all pertinent imaging results/findings within the past 24 hours.

## 2022-02-04 NOTE — PLAN OF CARE
SS spoke with MD, states pt still with fever and not yet d/c ready. Eliezer at [facility]. Per MD pt should be d/c ready by Monday. SS following.

## 2022-02-04 NOTE — ASSESSMENT & PLAN NOTE
- K: 3.2 today   - Added 10MEQ KCL IV  - K Bicarb 40 MEQ x2 given via PEG tube   - Will continue to monitor with BMP   2/4: Mg level ordered. 40MEQ dissolving tablet given via PEG tube last night

## 2022-02-04 NOTE — PROGRESS NOTES
[facility] - [address] [place] [address] Medicine  Progress Note    Patient Name: Owen Solis  MRN: [id number]  Patient Class: IP- Inpatient   Admission Date: 1/29/2022  Length of Stay: 6 days  Attending Physician: Freya Grant,*  Primary Care Provider: Angelina Jaramillo II, MD        Subjective:     Principal Problem:UTI (urinary tract infection)        HPI:  Owen Solis is a 61 y.o.m. with a PMHx of HTN, Alzheimer's Dz, metabolic encephalopathy, OA, and dementia that presents to [facility] ED from [facility] for fever, tachypnea, HTN, hypoxia and AMS. In the ED UA was positive for UTI and CXR show bibasilar atelectasis/infiltrates. ED nurse stated patient came into ED with upton cath in place that contain an extensive amount of sediment. Patient was admitted to inpatient family medicine for further care and treatment. According to [facility] patient was ambulatory and could talk, although inappropriate wording, until he contracted COVID-19 in August 2021. Since then patient has been nonverbal and nonambulatory. Patient is fed through a PEG tube due to not being able to swallow. According to NH patient has had to be admitted to the hospital several times recently for PNA. NH also state patient is Full code as did patient's sister. Patient's sister was contacted via phone and she verified what the NH reported.       Overview/Hospital Course:  No notes on file    Interval History: Patient is more alert today as far as looking at me when I enter the room. Patient is still non-verbal. He continues to be hypernatremic and we have increased free water flushes to 350cc q 2hrs from 250cc q2 hrs. Correcting potassium levels. WBC continues to be elevated. No elevated temp overnight. Continues to be on Diflucan.     Review of Systems   Unable to perform ROS: Patient nonverbal     Objective:     Vital Signs (Most Recent):  Temp: 97.3 °F (36.3 °C) (02/04/22 0746)  Pulse: 99 (02/04/22 0746)  Resp: 16 (02/04/22  0746)  BP: 112/77 (02/04/22 0746)  SpO2: 96 % (02/04/22 0746) Vital Signs (24h Range):  Temp:  [97.3 °F (36.3 °C)-98.6 °F (37 °C)] 97.3 °F (36.3 °C)  Pulse:  [] 99  Resp:  [16-20] 16  SpO2:  [90 %-100 %] 96 %  BP: ()/(56-77) 112/77     Weight: 71.6 kg (157 lb 13.6 oz)  Body mass index is 24 kg/m².  No intake or output data in the 24 hours ending 02/04/22 0915   Physical Exam  Vitals and nursing note reviewed.   Constitutional:       Appearance: He is not ill-appearing, toxic-appearing or diaphoretic.   HENT:      Head: Normocephalic and atraumatic.      Comments: Poor oral hygiene     Nose: Nose normal. No congestion or rhinorrhea.      Mouth/Throat:      Pharynx: No oropharyngeal exudate.   Eyes:      Pupils: Pupils are equal, round, and reactive to light.   Cardiovascular:      Rate and Rhythm: Normal rate and regular rhythm.      Pulses: Normal pulses.      Heart sounds: Normal heart sounds. No murmur heard.  No friction rub.   Pulmonary:      Effort: Pulmonary effort is normal. No respiratory distress.      Breath sounds: Normal breath sounds. No wheezing, rhonchi or rales.   Abdominal:      Tenderness: There is no abdominal tenderness. There is no guarding.   Musculoskeletal:      Right lower leg: No edema.      Left lower leg: No edema.   Skin:     General: Skin is warm.      Capillary Refill: Capillary refill takes less than 2 seconds.      Findings: Lesion (Sacral ulcer and skin breakdown around PEG tube; LT hip skin breakdown ) present.   Neurological:      Mental Status: He is alert.   Psychiatric:         Attention and Perception: He is inattentive.         Mood and Affect: Affect is blunt.         Speech: He is noncommunicative.         Behavior: Behavior normal.         Significant Labs: All pertinent labs within the past 24 hours have been reviewed.    Significant Imaging: I have reviewed all pertinent imaging results/findings within the past 24 hours.      Assessment/Plan:      * UTI  (urinary tract infection)  U/A  Positive for UTI.  - Rocephin 2g x1 dose in ED  - patient on Vancomycin and [place]  - Urine Cx grew ESBL  2/1: >100,000 yeast; Started Fluconazole IV 200mg for 14 days; likely secondary to Vancomycin and Zosyn for HCAP    Dehydration  - Continue to monitor BMP   - Increase free water flushes via PEG tube; increased from 250cc to 350cc    Healthcare-associated pneumonia  Patient came from [facility]; will be D/C to [place]   - Vancomycin (Day 7)   - Zosyn 4.5g Q8H (Day 7)  - Blood Cx x2 no growth   - Supplemental O2   - telemetry   - Duo nebs Q8H  - Patient on NC 3L  2/4: WBC is 21.70 today; continues to remain elevated. No overnight elevated temperature.     Hypokalemia  - K: 3.2 today   - Added 10MEQ KCL IV  - K Bicarb 40 MEQ x2 given via PEG tube   - Will continue to monitor with BMP   2/4: Mg level ordered. 40MEQ dissolving tablet given via PEG tube last night     Dysphagia  - PEG tube placed, intact  - Patient getting 1/2NS with 5% dextrose as continuous fluids   - PEG tube is feed at a rate of 40ml/hr with 125flush q2hrs      Hypernatremia  - Na level is 158  - Increased free water flushes from 125 to 250 q2hrs   - Continue to monitor   - 4.6 L free water deficit       Alzheimer disease  - Donepezil 10mg QD  - Benztropine 0.5 BID  - family wants patient to be switched to [facility] after discharge, PT/OT ordered, COVID test and TB pending       Primary hypertension  Holding Metoprolol succinate 25mg QD due to HoTN      OA (osteoarthritis)  - Flexeril 10mg BID  - acetaminophen PRN     Depression  Trazodone 50mg BID        GERD (gastroesophageal reflux disease)  Protonix 40mg IV QD      Elevated liver enzymes  On admission  and ; Alk Phos 112  - fluids and monitor CMP daily  2/2: Ordering a CMP to check liver enzymes tomorrow   2/4: ALT 84, AST40, Alk Phos is 106 (Have trended down since initial CMP)     Leukocytosis  - WBC count today is 21.70; up from  21.09  - Patient continues to be on Vancomycin and Zosyn   - Blood cultures negative for 72 hours   - CXR: no significant changes   - Treated for UTI       Pressure ulcer of left leg, stage 2  - Wound Care consulted - appreciate recommendations       Pressure injury of left hip, stage 1  - Wound care consulted - appreciate recommendations       Pressure injury, unstageable, with suspected deep tissue injury  - Wound Care consulted - appreciate recommendations   - Eschar noted on the sacrum; are not pursuing general surgery consult;     Microcytic anemia  H/H: 8.9/26.5  - MCV: 75.7          VTE Risk Mitigation (From admission, onward)         Ordered     IP VTE LOW RISK PATIENT  Once         01/29/22 1428     Place sequential compression device  Until discontinued         01/29/22 1428                Discharge Planning   ESTEFANY:      Code Status: Full Code   Is the patient medically ready for discharge?:     Reason for patient still in hospital (select all that apply): Treatment  Discharge Plan A: Skilled Nursing Facility                  Judith Valdez MD  Department of Hospital Medicine   [address]

## 2022-02-04 NOTE — ASSESSMENT & PLAN NOTE
- Continue to monitor BMP   - Increase free water flushes via PEG tube; increased from 250cc to 350cc

## 2022-02-04 NOTE — PLAN OF CARE
Problem: Infection  Goal: Absence of Infection Signs and Symptoms  Outcome: Ongoing, Progressing     Problem: Adult Inpatient Plan of Care  Goal: Plan of Care Review  Outcome: Ongoing, Progressing  Goal: Patient-Specific Goal (Individualized)  Outcome: Ongoing, Progressing  Goal: Absence of Hospital-Acquired Illness or Injury  Outcome: Ongoing, Progressing  Goal: Optimal Comfort and Wellbeing  Outcome: Ongoing, Progressing  Goal: Readiness for Transition of Care  Outcome: Ongoing, Progressing     Problem: Adjustment to Illness (Sepsis/Septic Shock)  Goal: Optimal Coping  Outcome: Ongoing, Progressing     Problem: Bleeding (Sepsis/Septic Shock)  Goal: Absence of Bleeding  Outcome: Ongoing, Progressing     Problem: Glycemic Control Impaired (Sepsis/Septic Shock)  Goal: Blood Glucose Level Within Desired Range  Outcome: Ongoing, Progressing     Problem: Infection Progression (Sepsis/Septic Shock)  Goal: Absence of Infection Signs and Symptoms  Outcome: Ongoing, Progressing     Problem: Nutrition Impaired (Sepsis/Septic Shock)  Goal: Optimal Nutrition Intake  Outcome: Ongoing, Progressing     Problem: Fluid Imbalance (Pneumonia)  Goal: Fluid Balance  Outcome: Ongoing, Progressing     Problem: Infection (Pneumonia)  Goal: Resolution of Infection Signs and Symptoms  Outcome: Ongoing, Progressing     Problem: Respiratory Compromise (Pneumonia)  Goal: Effective Oxygenation and Ventilation  Outcome: Ongoing, Progressing     Problem: Fall Injury Risk  Goal: Absence of Fall and Fall-Related Injury  Outcome: Ongoing, Progressing     Problem: Skin Injury Risk Increased  Goal: Skin Health and Integrity  Outcome: Ongoing, Progressing     Problem: Impaired Wound Healing  Goal: Optimal Wound Healing  Outcome: Ongoing, Progressing     Problem: Gas Exchange Impaired  Goal: Optimal Gas Exchange  Outcome: Ongoing, Progressing

## 2022-02-05 LAB
ANION GAP SERPL CALCULATED.3IONS-SCNC: 13 MMOL/L (ref 7–16)
BASOPHILS # BLD AUTO: 0.02 K/UL (ref 0–0.2)
BASOPHILS NFR BLD AUTO: 0.1 % (ref 0–1)
BUN SERPL-MCNC: 22 MG/DL (ref 7–18)
BUN/CREAT SERPL: 20 (ref 6–20)
CALCIUM SERPL-MCNC: 9.4 MG/DL (ref 8.5–10.1)
CHLORIDE SERPL-SCNC: 106 MMOL/L (ref 98–107)
CO2 SERPL-SCNC: 30 MMOL/L (ref 21–32)
CREAT SERPL-MCNC: 1.11 MG/DL (ref 0.7–1.3)
DIFFERENTIAL METHOD BLD: ABNORMAL
EOSINOPHIL # BLD AUTO: 0.3 K/UL (ref 0–0.5)
EOSINOPHIL NFR BLD AUTO: 2.1 % (ref 1–4)
ERYTHROCYTE [DISTWIDTH] IN BLOOD BY AUTOMATED COUNT: 21.2 % (ref 11.5–14.5)
GLUCOSE SERPL-MCNC: 111 MG/DL (ref 70–105)
GLUCOSE SERPL-MCNC: 114 MG/DL (ref 74–106)
GLUCOSE SERPL-MCNC: 119 MG/DL (ref 70–105)
GLUCOSE SERPL-MCNC: 142 MG/DL (ref 70–105)
GLUCOSE SERPL-MCNC: 96 MG/DL (ref 70–105)
HCT VFR BLD AUTO: 25.7 % (ref 40–54)
HGB BLD-MCNC: 8.7 G/DL (ref 13.5–18)
IMM GRANULOCYTES # BLD AUTO: 0.26 K/UL (ref 0–0.04)
IMM GRANULOCYTES NFR BLD: 1.8 % (ref 0–0.4)
LYMPHOCYTES # BLD AUTO: 1.06 K/UL (ref 1–4.8)
LYMPHOCYTES NFR BLD AUTO: 7.4 % (ref 27–41)
MCH RBC QN AUTO: 25.7 PG (ref 27–31)
MCHC RBC AUTO-ENTMCNC: 33.9 G/DL (ref 32–36)
MCV RBC AUTO: 76 FL (ref 80–96)
MONOCYTES # BLD AUTO: 0.51 K/UL (ref 0–0.8)
MONOCYTES NFR BLD AUTO: 3.5 % (ref 2–6)
MPC BLD CALC-MCNC: 10.8 FL (ref 9.4–12.4)
NEUTROPHILS # BLD AUTO: 12.23 K/UL (ref 1.8–7.7)
NEUTROPHILS NFR BLD AUTO: 85.1 % (ref 53–65)
NRBC # BLD AUTO: 0.04 X10E3/UL
NRBC, AUTO (.00): 0.3 %
PLATELET # BLD AUTO: 378 K/UL (ref 150–400)
POTASSIUM SERPL-SCNC: 3.4 MMOL/L (ref 3.5–5.1)
RBC # BLD AUTO: 3.38 M/UL (ref 4.6–6.2)
SODIUM SERPL-SCNC: 146 MMOL/L (ref 136–145)
WBC # BLD AUTO: 14.38 K/UL (ref 4.5–11)

## 2022-02-05 PROCEDURE — 25000003 PHARM REV CODE 250: Performed by: INTERNAL MEDICINE

## 2022-02-05 PROCEDURE — 80048 BASIC METABOLIC PNL TOTAL CA: CPT

## 2022-02-05 PROCEDURE — 99232 SBSQ HOSP IP/OBS MODERATE 35: CPT | Mod: GC,,, | Performed by: INTERNAL MEDICINE

## 2022-02-05 PROCEDURE — 25000003 PHARM REV CODE 250

## 2022-02-05 PROCEDURE — 36415 COLL VENOUS BLD VENIPUNCTURE: CPT

## 2022-02-05 PROCEDURE — 63600175 PHARM REV CODE 636 W HCPCS

## 2022-02-05 PROCEDURE — 63600175 PHARM REV CODE 636 W HCPCS: Performed by: INTERNAL MEDICINE

## 2022-02-05 PROCEDURE — 85025 COMPLETE CBC W/AUTO DIFF WBC: CPT

## 2022-02-05 PROCEDURE — C9113 INJ PANTOPRAZOLE SODIUM, VIA: HCPCS

## 2022-02-05 PROCEDURE — 25000242 PHARM REV CODE 250 ALT 637 W/ HCPCS

## 2022-02-05 PROCEDURE — 11000001 HC ACUTE MED/SURG PRIVATE ROOM

## 2022-02-05 PROCEDURE — 82962 GLUCOSE BLOOD TEST: CPT

## 2022-02-05 PROCEDURE — 94640 AIRWAY INHALATION TREATMENT: CPT

## 2022-02-05 PROCEDURE — 63700000 PHARM REV CODE 250 ALT 637 W/O HCPCS: Performed by: INTERNAL MEDICINE

## 2022-02-05 PROCEDURE — 27000221 HC OXYGEN, UP TO 24 HOURS

## 2022-02-05 PROCEDURE — 94761 N-INVAS EAR/PLS OXIMETRY MLT: CPT

## 2022-02-05 PROCEDURE — 99232 PR SUBSEQUENT HOSPITAL CARE,LEVL II: ICD-10-PCS | Mod: GC,,, | Performed by: INTERNAL MEDICINE

## 2022-02-05 RX ADMIN — FOLIC ACID 1 MG: 1 TABLET ORAL at 09:02

## 2022-02-05 RX ADMIN — PIPERACILLIN AND TAZOBACTAM 4.5 G: 4; .5 INJECTION, POWDER, FOR SOLUTION INTRAVENOUS at 09:02

## 2022-02-05 RX ADMIN — POTASSIUM BICARBONATE 40 MEQ: 782 TABLET, EFFERVESCENT ORAL at 09:02

## 2022-02-05 RX ADMIN — TRAZODONE HYDROCHLORIDE 25 MG: 50 TABLET ORAL at 08:02

## 2022-02-05 RX ADMIN — BENZTROPINE MESYLATE 0.5 MG: 0.5 TABLET ORAL at 09:02

## 2022-02-05 RX ADMIN — PIPERACILLIN AND TAZOBACTAM 4.5 G: 4; .5 INJECTION, POWDER, FOR SOLUTION INTRAVENOUS at 04:02

## 2022-02-05 RX ADMIN — BENZTROPINE MESYLATE 0.5 MG: 0.5 TABLET ORAL at 08:02

## 2022-02-05 RX ADMIN — PIPERACILLIN AND TAZOBACTAM 4.5 G: 4; .5 INJECTION, POWDER, FOR SOLUTION INTRAVENOUS at 11:02

## 2022-02-05 RX ADMIN — IPRATROPIUM BROMIDE AND ALBUTEROL SULFATE 3 ML: 2.5; .5 SOLUTION RESPIRATORY (INHALATION) at 03:02

## 2022-02-05 RX ADMIN — DONEPEZIL HYDROCHLORIDE 10 MG: 5 TABLET, FILM COATED ORAL at 08:02

## 2022-02-05 RX ADMIN — IPRATROPIUM BROMIDE AND ALBUTEROL SULFATE 3 ML: 2.5; .5 SOLUTION RESPIRATORY (INHALATION) at 08:02

## 2022-02-05 RX ADMIN — SILVER SULFADIAZINE: 10 CREAM TOPICAL at 03:02

## 2022-02-05 RX ADMIN — MULTIVITAMIN 15 ML: LIQUID ORAL at 09:02

## 2022-02-05 RX ADMIN — PANTOPRAZOLE SODIUM 40 MG: 40 INJECTION, POWDER, FOR SOLUTION INTRAVENOUS at 09:02

## 2022-02-05 RX ADMIN — TRAZODONE HYDROCHLORIDE 25 MG: 50 TABLET ORAL at 04:02

## 2022-02-05 RX ADMIN — TRAZODONE HYDROCHLORIDE 25 MG: 50 TABLET ORAL at 09:02

## 2022-02-05 RX ADMIN — IPRATROPIUM BROMIDE AND ALBUTEROL SULFATE 3 ML: 2.5; .5 SOLUTION RESPIRATORY (INHALATION) at 11:02

## 2022-02-05 RX ADMIN — MICONAZOLE NITRATE: 20 CREAM TOPICAL at 03:02

## 2022-02-05 RX ADMIN — SILVER SULFADIAZINE: 10 CREAM TOPICAL at 09:02

## 2022-02-05 RX ADMIN — FLUCONAZOLE 200 MG: 100 TABLET ORAL at 09:02

## 2022-02-05 RX ADMIN — MICONAZOLE NITRATE: 20 CREAM TOPICAL at 09:02

## 2022-02-05 RX ADMIN — VANCOMYCIN HYDROCHLORIDE 1250 MG: 1 INJECTION, POWDER, LYOPHILIZED, FOR SOLUTION INTRAVENOUS at 06:02

## 2022-02-05 NOTE — NURSING
Rec'd awake in the bed. Does not respond verbally. VS stable. Afebrile. O2 in use. Nepro feeding infusing per PEG tube. At 40ml/hr. SED's to BLE. Special bed and turning system in use. No family at bedside. NAD

## 2022-02-05 NOTE — ASSESSMENT & PLAN NOTE
U/A  Positive for UTI.  - Rocephin 2g x1 dose in ED  - Urine Cx grew ESBL  2/1: >100,000 yeast; Started Fluconazole IV 200mg for 14 days; likely secondary to Vancomycin and Zosyn for HCAP  2/5: Continuing with Vanc and Zosyn day 8, WBC count improving

## 2022-02-05 NOTE — PROGRESS NOTES
[address] Medicine  Progress Note    Patient Name: Owen Solis  MRN: [id number]  Patient Class: IP- Inpatient   Admission Date: 1/29/2022  Length of Stay: 7 days  Attending Physician: Freya Grant,*  Primary Care Provider: Angelina Jaramillo II, MD        Subjective:     Principal Problem:UTI (urinary tract infection)        HPI:  Owen Solis is a 61 y.o.m. with a PMHx of HTN, Alzheimer's Dz, metabolic encephalopathy, OA, and dementia that presents to [facility] ED from [facility] for fever, tachypnea, HTN, hypoxia and AMS. In the ED UA was positive for UTI and CXR show bibasilar atelectasis/infiltrates. ED nurse stated patient came into ED with upton cath in place that contain an extensive amount of sediment. Patient was admitted to inpatient family medicine for further care and treatment. According to [facility] patient was ambulatory and could talk, although inappropriate wording, until he contracted COVID-19 in August 2021. Since then patient has been nonverbal and nonambulatory. Patient is fed through a PEG tube due to not being able to swallow. According to NH patient has had to be admitted to the hospital several times recently for PNA. NH also state patient is Full code as did patient's sister. Patient's sister was contacted via phone and she verified what the NH reported.       Overview/Hospital Course:  No notes on file    Interval History: Patient seen this morning resting in bed with no family at bedside. No events overnight. AF VSS, leukocytosis improved. Will continue antibiotics and antifungals. No surgery needed for sacral wound.    Review of Systems   Unable to perform ROS: Patient nonverbal     Objective:     Vital Signs (Most Recent):  Temp: 98.4 °F (36.9 °C) (02/05/22 0400)  Pulse: 96 (02/05/22 0836)  Resp: 20 (02/05/22 0836)  BP: 128/79 (02/05/22 0400)  SpO2: 96 % (02/05/22 0400) Vital Signs (24h Range):  Temp:  [97.6 °F (36.4 °C)-98.4 °F (36.9  °C)] 98.4 °F (36.9 °C)  Pulse:  [76-99] 96  Resp:  [18-20] 20  SpO2:  [96 %-100 %] 96 %  BP: (104-128)/(65-83) 128/79     Weight: 71.6 kg (157 lb 13.6 oz)  Body mass index is 24 kg/m².    Intake/Output Summary (Last 24 hours) at 2/5/2022 0910  Last data filed at 2/5/2022 0500  Gross per 24 hour   Intake 2150 ml   Output --   Net 2150 ml      Physical Exam  Vitals and nursing note reviewed.   Constitutional:       Appearance: He is not ill-appearing, toxic-appearing or diaphoretic.   HENT:      Head: Normocephalic and atraumatic.      Comments: Poor oral hygiene     Nose: Nose normal. No congestion or rhinorrhea.      Mouth/Throat:      Pharynx: No oropharyngeal exudate.   Eyes:      Pupils: Pupils are equal, round, and reactive to light.   Cardiovascular:      Rate and Rhythm: Normal rate and regular rhythm.      Pulses: Normal pulses.      Heart sounds: Normal heart sounds. No murmur heard.  No friction rub.   Pulmonary:      Effort: Pulmonary effort is normal. No respiratory distress.      Breath sounds: Normal breath sounds. No wheezing, rhonchi or rales.   Abdominal:      Tenderness: There is no abdominal tenderness. There is no guarding.   Musculoskeletal:      Right lower leg: No edema.      Left lower leg: No edema.   Skin:     General: Skin is warm.      Capillary Refill: Capillary refill takes less than 2 seconds.      Findings: Lesion (Sacral ulcer and skin breakdown around PEG tube; LT hip skin breakdown ) present.   Neurological:      Mental Status: He is alert.   Psychiatric:         Attention and Perception: He is inattentive.         Mood and Affect: Affect is blunt.         Speech: He is noncommunicative.         Behavior: Behavior normal.         Significant Labs: All pertinent labs within the past 24 hours have been reviewed.    Significant Imaging: I have reviewed all pertinent imaging results/findings within the past 24 hours.      Assessment/Plan:      * UTI (urinary tract infection)  U/A   Positive for UTI.  - Rocephin 2g x1 dose in ED  - Urine Cx grew ESBL  2/1: >100,000 yeast; Started Fluconazole IV 200mg for 14 days; likely secondary to Vancomycin and Zosyn for HCAP  2/5: Continuing with Vanc and Zosyn day 8, WBC count improving    Healthcare-associated pneumonia  Patient came from [facility]; will be D/C to [place]   - Vancomycin (Day 7)   - Zosyn 4.5g Q8H (Day 7)  - Blood Cx x2 no growth   - Supplemental O2   - telemetry   - Duo nebs Q8H  - Patient on NC 3L  2/4: WBC is 21.70 today; continues to remain elevated. No overnight elevated temperature.   2/5: Improving. Lung sounds better. Leukocytosis resolving. Afebrile overnight. Continue antibiotics as above.    Dysphagia  - PEG tube placed, intact  - Patient getting 1/2NS with 5% dextrose as continuous fluids   - PEG tube is feed at a rate of 40ml/hr with 125flush q2hrs      Pressure injury, unstageable, with suspected deep tissue injury  - Wound Care consulted - appreciate recommendations   - Eschar noted on the sacrum; are not pursuing general surgery consult;     Pressure injury of left hip, stage 1  - Wound care consulted - appreciate recommendations       Pressure ulcer of left leg, stage 2  - Wound Care consulted - appreciate recommendations       Leukocytosis  - WBC count today is 21.70; up from 21.09  - Patient continues to be on Vancomycin and Zosyn   - Blood cultures negative for 72 hours   - CXR: no significant changes   - Treated for UTI       Microcytic anemia  H/H: 8.9/26.5  - MCV: 75.7        Hypernatremia  - Na level is 158  - Increased free water flushes from 125 to 250 q2hrs   - Continue to monitor   - 4.6 L free water deficit       Elevated liver enzymes  On admission  and ; Alk Phos 112  - fluids and monitor CMP daily  2/2: Ordering a CMP to check liver enzymes tomorrow   2/4: ALT 84, AST40, Alk Phos is 106 (Have trended down since initial CMP)     GERD (gastroesophageal reflux disease)  Protonix 40mg IV  QD      Depression  Trazodone 50mg BID        Primary hypertension  Holding Metoprolol succinate 25mg QD due to HoTN      OA (osteoarthritis)  - Flexeril 10mg BID  - acetaminophen PRN     Hypokalemia  - K: 3.2 today   - Added 10MEQ KCL IV  - K Bicarb 40 MEQ x2 given via PEG tube   - Will continue to monitor with BMP   2/4: Mg level ordered. 40MEQ dissolving tablet given via PEG tube last night     Alzheimer disease  - Donepezil 10mg QD  - Benztropine 0.5 BID  - family wants patient to be switched to [facility] after discharge, PT/OT ordered, COVID test and TB pending       Dehydration  - Continue to monitor BMP   - Increase free water flushes via PEG tube; increased from 250cc to 350cc      VTE Risk Mitigation (From admission, onward)         Ordered     IP VTE LOW RISK PATIENT  Once         01/29/22 1428     Place sequential compression device  Until discontinued         01/29/22 1428                Discharge Planning   ESTEFANY:      Code Status: Full Code   Is the patient medically ready for discharge?:     Reason for patient still in hospital (select all that apply): Treatment  Discharge Plan A: Skilled Nursing Facility                  Raymond Torres MD  Department of Hospital Medicine   [address]

## 2022-02-05 NOTE — SUBJECTIVE & OBJECTIVE
Interval History: Patient seen this morning resting in bed with no family at bedside. No events overnight. AF VSS, leukocytosis improved. Will continue antibiotics and antifungals. No surgery needed for sacral wound.    Review of Systems   Unable to perform ROS: Patient nonverbal     Objective:     Vital Signs (Most Recent):  Temp: 98.4 °F (36.9 °C) (02/05/22 0400)  Pulse: 96 (02/05/22 0836)  Resp: 20 (02/05/22 0836)  BP: 128/79 (02/05/22 0400)  SpO2: 96 % (02/05/22 0400) Vital Signs (24h Range):  Temp:  [97.6 °F (36.4 °C)-98.4 °F (36.9 °C)] 98.4 °F (36.9 °C)  Pulse:  [76-99] 96  Resp:  [18-20] 20  SpO2:  [96 %-100 %] 96 %  BP: (104-128)/(65-83) 128/79     Weight: 71.6 kg (157 lb 13.6 oz)  Body mass index is 24 kg/m².    Intake/Output Summary (Last 24 hours) at 2/5/2022 0910  Last data filed at 2/5/2022 0500  Gross per 24 hour   Intake 2150 ml   Output --   Net 2150 ml      Physical Exam  Vitals and nursing note reviewed.   Constitutional:       Appearance: He is not ill-appearing, toxic-appearing or diaphoretic.   HENT:      Head: Normocephalic and atraumatic.      Comments: Poor oral hygiene     Nose: Nose normal. No congestion or rhinorrhea.      Mouth/Throat:      Pharynx: No oropharyngeal exudate.   Eyes:      Pupils: Pupils are equal, round, and reactive to light.   Cardiovascular:      Rate and Rhythm: Normal rate and regular rhythm.      Pulses: Normal pulses.      Heart sounds: Normal heart sounds. No murmur heard.  No friction rub.   Pulmonary:      Effort: Pulmonary effort is normal. No respiratory distress.      Breath sounds: Normal breath sounds. No wheezing, rhonchi or rales.   Abdominal:      Tenderness: There is no abdominal tenderness. There is no guarding.   Musculoskeletal:      Right lower leg: No edema.      Left lower leg: No edema.   Skin:     General: Skin is warm.      Capillary Refill: Capillary refill takes less than 2 seconds.      Findings: Lesion (Sacral ulcer and skin breakdown around  PEG tube; LT hip skin breakdown ) present.   Neurological:      Mental Status: He is alert.   Psychiatric:         Attention and Perception: He is inattentive.         Mood and Affect: Affect is blunt.         Speech: He is noncommunicative.         Behavior: Behavior normal.         Significant Labs: All pertinent labs within the past 24 hours have been reviewed.    Significant Imaging: I have reviewed all pertinent imaging results/findings within the past 24 hours.

## 2022-02-05 NOTE — ASSESSMENT & PLAN NOTE
Patient came from [facility]; will be D/C to [place]   - Vancomycin (Day 7)   - Zosyn 4.5g Q8H (Day 7)  - Blood Cx x2 no growth   - Supplemental O2   - telemetry   - Duo nebs Q8H  - Patient on NC 3L  2/4: WBC is 21.70 today; continues to remain elevated. No overnight elevated temperature.   2/5: Improving. Lung sounds better. Leukocytosis resolving. Afebrile overnight. Continue antibiotics as above.

## 2022-02-06 PROBLEM — E87.6 HYPOKALEMIA: Status: RESOLVED | Noted: 2021-09-15 | Resolved: 2022-02-06

## 2022-02-06 LAB
ANION GAP SERPL CALCULATED.3IONS-SCNC: 22 MMOL/L (ref 7–16)
ANISOCYTOSIS BLD QL SMEAR: ABNORMAL
BASOPHILS # BLD AUTO: 0.02 K/UL (ref 0–0.2)
BASOPHILS NFR BLD AUTO: 0.1 % (ref 0–1)
BUN SERPL-MCNC: 19 MG/DL (ref 7–18)
BUN/CREAT SERPL: 23 (ref 6–20)
CALCIUM SERPL-MCNC: 9.8 MG/DL (ref 8.5–10.1)
CHLORIDE SERPL-SCNC: 102 MMOL/L (ref 98–107)
CO2 SERPL-SCNC: 30 MMOL/L (ref 21–32)
CREAT SERPL-MCNC: 0.84 MG/DL (ref 0.7–1.3)
DIFFERENTIAL METHOD BLD: ABNORMAL
EOSINOPHIL # BLD AUTO: 0.28 K/UL (ref 0–0.5)
EOSINOPHIL NFR BLD AUTO: 2.1 % (ref 1–4)
ERYTHROCYTE [DISTWIDTH] IN BLOOD BY AUTOMATED COUNT: 21.3 % (ref 11.5–14.5)
GLUCOSE SERPL-MCNC: 105 MG/DL (ref 74–106)
GLUCOSE SERPL-MCNC: 114 MG/DL (ref 70–105)
GLUCOSE SERPL-MCNC: 122 MG/DL (ref 70–105)
GLUCOSE SERPL-MCNC: 142 MG/DL (ref 70–105)
HCT VFR BLD AUTO: 25.1 % (ref 40–54)
HGB BLD-MCNC: 8.4 G/DL (ref 13.5–18)
HYPOCHROMIA BLD QL SMEAR: ABNORMAL
IMM GRANULOCYTES # BLD AUTO: 0.26 K/UL (ref 0–0.04)
IMM GRANULOCYTES NFR BLD: 1.9 % (ref 0–0.4)
LYMPHOCYTES # BLD AUTO: 1.3 K/UL (ref 1–4.8)
LYMPHOCYTES NFR BLD AUTO: 9.6 % (ref 27–41)
MCH RBC QN AUTO: 25 PG (ref 27–31)
MCHC RBC AUTO-ENTMCNC: 33.5 G/DL (ref 32–36)
MCV RBC AUTO: 74.7 FL (ref 80–96)
MICROCYTES BLD QL SMEAR: ABNORMAL
MONOCYTES # BLD AUTO: 0.5 K/UL (ref 0–0.8)
MONOCYTES NFR BLD AUTO: 3.7 % (ref 2–6)
MPC BLD CALC-MCNC: 10.8 FL (ref 9.4–12.4)
NEUTROPHILS # BLD AUTO: 11.13 K/UL (ref 1.8–7.7)
NEUTROPHILS NFR BLD AUTO: 82.6 % (ref 53–65)
NRBC # BLD AUTO: 0.04 X10E3/UL
NRBC, AUTO (.00): 0.3 %
OVALOCYTES BLD QL SMEAR: ABNORMAL
PLATELET # BLD AUTO: 382 K/UL (ref 150–400)
PLATELET MORPHOLOGY: ABNORMAL
POLYCHROMASIA BLD QL SMEAR: ABNORMAL
POTASSIUM SERPL-SCNC: 4.2 MMOL/L (ref 3.5–5.1)
RBC # BLD AUTO: 3.36 M/UL (ref 4.6–6.2)
SODIUM SERPL-SCNC: 150 MMOL/L (ref 136–145)
TARGETS BLD QL SMEAR: ABNORMAL
VANCOMYCIN TROUGH SERPL-MCNC: 19.4 ΜG/ML (ref 10–20)
WBC # BLD AUTO: 13.49 K/UL (ref 4.5–11)

## 2022-02-06 PROCEDURE — 94640 AIRWAY INHALATION TREATMENT: CPT

## 2022-02-06 PROCEDURE — 80202 ASSAY OF VANCOMYCIN: CPT | Performed by: INTERNAL MEDICINE

## 2022-02-06 PROCEDURE — 25000242 PHARM REV CODE 250 ALT 637 W/ HCPCS

## 2022-02-06 PROCEDURE — 36415 COLL VENOUS BLD VENIPUNCTURE: CPT | Performed by: INTERNAL MEDICINE

## 2022-02-06 PROCEDURE — 94761 N-INVAS EAR/PLS OXIMETRY MLT: CPT

## 2022-02-06 PROCEDURE — 11000001 HC ACUTE MED/SURG PRIVATE ROOM

## 2022-02-06 PROCEDURE — 27000221 HC OXYGEN, UP TO 24 HOURS

## 2022-02-06 PROCEDURE — 63700000 PHARM REV CODE 250 ALT 637 W/O HCPCS: Performed by: INTERNAL MEDICINE

## 2022-02-06 PROCEDURE — 25000003 PHARM REV CODE 250

## 2022-02-06 PROCEDURE — 82962 GLUCOSE BLOOD TEST: CPT

## 2022-02-06 PROCEDURE — 63600175 PHARM REV CODE 636 W HCPCS

## 2022-02-06 PROCEDURE — 63600175 PHARM REV CODE 636 W HCPCS: Performed by: INTERNAL MEDICINE

## 2022-02-06 PROCEDURE — 99232 PR SUBSEQUENT HOSPITAL CARE,LEVL II: ICD-10-PCS | Mod: GC,,, | Performed by: INTERNAL MEDICINE

## 2022-02-06 PROCEDURE — 80048 BASIC METABOLIC PNL TOTAL CA: CPT | Performed by: INTERNAL MEDICINE

## 2022-02-06 PROCEDURE — 25000003 PHARM REV CODE 250: Performed by: INTERNAL MEDICINE

## 2022-02-06 PROCEDURE — C9113 INJ PANTOPRAZOLE SODIUM, VIA: HCPCS

## 2022-02-06 PROCEDURE — 99232 SBSQ HOSP IP/OBS MODERATE 35: CPT | Mod: GC,,, | Performed by: INTERNAL MEDICINE

## 2022-02-06 PROCEDURE — 85025 COMPLETE CBC W/AUTO DIFF WBC: CPT | Performed by: INTERNAL MEDICINE

## 2022-02-06 RX ORDER — SODIUM CHLORIDE 450 MG/100ML
INJECTION, SOLUTION INTRAVENOUS CONTINUOUS
Status: DISPENSED | OUTPATIENT
Start: 2022-02-06 | End: 2022-02-07

## 2022-02-06 RX ADMIN — TRAZODONE HYDROCHLORIDE 25 MG: 50 TABLET ORAL at 08:02

## 2022-02-06 RX ADMIN — PIPERACILLIN AND TAZOBACTAM 4.5 G: 4; .5 INJECTION, POWDER, FOR SOLUTION INTRAVENOUS at 03:02

## 2022-02-06 RX ADMIN — MICONAZOLE NITRATE: 20 CREAM TOPICAL at 09:02

## 2022-02-06 RX ADMIN — MULTIVITAMIN 15 ML: LIQUID ORAL at 08:02

## 2022-02-06 RX ADMIN — SODIUM CHLORIDE: 4.5 INJECTION, SOLUTION INTRAVENOUS at 04:02

## 2022-02-06 RX ADMIN — FOLIC ACID 1 MG: 1 TABLET ORAL at 08:02

## 2022-02-06 RX ADMIN — DONEPEZIL HYDROCHLORIDE 10 MG: 5 TABLET, FILM COATED ORAL at 08:02

## 2022-02-06 RX ADMIN — BENZTROPINE MESYLATE 0.5 MG: 0.5 TABLET ORAL at 08:02

## 2022-02-06 RX ADMIN — SILVER SULFADIAZINE: 10 CREAM TOPICAL at 09:02

## 2022-02-06 RX ADMIN — PIPERACILLIN AND TAZOBACTAM 4.5 G: 4; .5 INJECTION, POWDER, FOR SOLUTION INTRAVENOUS at 11:02

## 2022-02-06 RX ADMIN — PANTOPRAZOLE SODIUM 40 MG: 40 INJECTION, POWDER, FOR SOLUTION INTRAVENOUS at 08:02

## 2022-02-06 RX ADMIN — IPRATROPIUM BROMIDE AND ALBUTEROL SULFATE 3 ML: 2.5; .5 SOLUTION RESPIRATORY (INHALATION) at 08:02

## 2022-02-06 RX ADMIN — MICONAZOLE NITRATE: 20 CREAM TOPICAL at 08:02

## 2022-02-06 RX ADMIN — IPRATROPIUM BROMIDE AND ALBUTEROL SULFATE 3 ML: 2.5; .5 SOLUTION RESPIRATORY (INHALATION) at 03:02

## 2022-02-06 RX ADMIN — VANCOMYCIN HYDROCHLORIDE 1250 MG: 1 INJECTION, POWDER, LYOPHILIZED, FOR SOLUTION INTRAVENOUS at 05:02

## 2022-02-06 RX ADMIN — PIPERACILLIN AND TAZOBACTAM 4.5 G: 4; .5 INJECTION, POWDER, FOR SOLUTION INTRAVENOUS at 08:02

## 2022-02-06 RX ADMIN — IPRATROPIUM BROMIDE AND ALBUTEROL SULFATE 3 ML: 2.5; .5 SOLUTION RESPIRATORY (INHALATION) at 11:02

## 2022-02-06 RX ADMIN — FLUCONAZOLE 200 MG: 100 TABLET ORAL at 08:02

## 2022-02-06 RX ADMIN — TRAZODONE HYDROCHLORIDE 25 MG: 50 TABLET ORAL at 02:02

## 2022-02-06 NOTE — NURSING
Rec'd lying awake in the bed. Resp even nonlabored. Does not respond verbally. VS stable. Afebrile. Drsg to sacral wound D/I. NAD

## 2022-02-06 NOTE — PLAN OF CARE
Problem: Infection  Goal: Absence of Infection Signs and Symptoms  Outcome: Ongoing, Progressing     Problem: Adult Inpatient Plan of Care  Goal: Plan of Care Review  Outcome: Ongoing, Progressing  Goal: Patient-Specific Goal (Individualized)  Outcome: Ongoing, Progressing  Goal: Absence of Hospital-Acquired Illness or Injury  Outcome: Ongoing, Progressing  Intervention: Identify and Manage Fall Risk  Flowsheets (Taken 2/6/2022 1234)  Safety Promotion/Fall Prevention:   high risk medications identified   lighting adjusted   medications reviewed   nonskid shoes/socks when out of bed   side rails raised x 3  Intervention: Prevent and Manage VTE (Venous Thromboembolism) Risk  Flowsheets (Taken 2/6/2022 1234)  Activity Management: Rolling - L1  Goal: Optimal Comfort and Wellbeing  Outcome: Ongoing, Progressing  Goal: Readiness for Transition of Care  Outcome: Ongoing, Progressing     Problem: Adjustment to Illness (Sepsis/Septic Shock)  Goal: Optimal Coping  Outcome: Ongoing, Progressing     Problem: Bleeding (Sepsis/Septic Shock)  Goal: Absence of Bleeding  Outcome: Ongoing, Progressing     Problem: Glycemic Control Impaired (Sepsis/Septic Shock)  Goal: Blood Glucose Level Within Desired Range  Outcome: Ongoing, Progressing     Problem: Infection Progression (Sepsis/Septic Shock)  Goal: Absence of Infection Signs and Symptoms  Outcome: Ongoing, Progressing  Intervention: Promote Recovery  Flowsheets (Taken 2/6/2022 1234)  Activity Management: Rolling - L1     Problem: Nutrition Impaired (Sepsis/Septic Shock)  Goal: Optimal Nutrition Intake  Outcome: Ongoing, Progressing     Problem: Fluid Imbalance (Pneumonia)  Goal: Fluid Balance  Outcome: Ongoing, Progressing     Problem: Infection (Pneumonia)  Goal: Resolution of Infection Signs and Symptoms  Outcome: Ongoing, Progressing     Problem: Respiratory Compromise (Pneumonia)  Goal: Effective Oxygenation and Ventilation  Outcome: Ongoing, Progressing  Intervention:  Optimize Oxygenation and Ventilation  Flowsheets (Taken 2/6/2022 1234)  Head of Bed (HOB) Positioning: HOB at 30-45 degrees     Problem: Fall Injury Risk  Goal: Absence of Fall and Fall-Related Injury  Outcome: Ongoing, Progressing  Intervention: Promote Injury-Free Environment  Flowsheets (Taken 2/6/2022 1234)  Safety Promotion/Fall Prevention:   high risk medications identified   lighting adjusted   medications reviewed   nonskid shoes/socks when out of bed   side rails raised x 3     Problem: Skin Injury Risk Increased  Goal: Skin Health and Integrity  Outcome: Ongoing, Progressing  Intervention: Optimize Skin Protection  Flowsheets (Taken 2/6/2022 1234)  Pressure Reduction Techniques:   heels elevated off bed   positioned off wounds   pressure points protected  Head of Bed (HOB) Positioning: HOB at 30-45 degrees     Problem: Impaired Wound Healing  Goal: Optimal Wound Healing  Outcome: Ongoing, Progressing  Intervention: Promote Wound Healing  Flowsheets (Taken 2/6/2022 1234)  Activity Management: Rolling - L1     Problem: Gas Exchange Impaired  Goal: Optimal Gas Exchange  Outcome: Ongoing, Progressing  Intervention: Optimize Oxygenation and Ventilation  Flowsheets (Taken 2/6/2022 1234)  Head of Bed (HOB) Positioning: HOB at 30-45 degrees

## 2022-02-06 NOTE — SUBJECTIVE & OBJECTIVE
Interval History: Patient is more responsive and alert today. The patient's sacral ulcer is softer today after wound care. Patient's WBC count continues to decline and the patient has not had a fever overnight. A upton was placed today to prevent further breakdown and infection of the sacral wound. Will consider discharging to the [facility] tomorrow if WBC continues to decline.     Review of Systems   Unable to perform ROS: Patient nonverbal     Objective:     Vital Signs (Most Recent):  Temp: 98.8 °F (37.1 °C) (02/06/22 1030)  Pulse: (!) 113 (02/06/22 1030)  Resp: 18 (02/06/22 1030)  BP: 106/72 (02/06/22 1030)  SpO2: 100 % (02/06/22 1030) Vital Signs (24h Range):  Temp:  [97.3 °F (36.3 °C)-98.8 °F (37.1 °C)] 98.8 °F (37.1 °C)  Pulse:  [] 113  Resp:  [18-20] 18  SpO2:  [95 %-100 %] 100 %  BP: (106-158)/(60-94) 106/72     Weight: 71.6 kg (157 lb 13.6 oz)  Body mass index is 24 kg/m².    Intake/Output Summary (Last 24 hours) at 2/6/2022 1239  Last data filed at 2/6/2022 0500  Gross per 24 hour   Intake 2230 ml   Output --   Net 2230 ml      Physical Exam  Vitals and nursing note reviewed.   Constitutional:       Appearance: He is not ill-appearing, toxic-appearing or diaphoretic.   HENT:      Head: Normocephalic and atraumatic.      Comments: Poor oral hygiene     Nose: Nose normal. No congestion or rhinorrhea.      Mouth/Throat:      Pharynx: No oropharyngeal exudate.   Eyes:      Pupils: Pupils are equal, round, and reactive to light.   Cardiovascular:      Rate and Rhythm: Normal rate and regular rhythm.      Pulses: Normal pulses.      Heart sounds: Normal heart sounds. No murmur heard.  No friction rub.   Pulmonary:      Effort: Pulmonary effort is normal. No respiratory distress.      Breath sounds: Normal breath sounds. No wheezing, rhonchi or rales.   Abdominal:      Tenderness: There is no abdominal tenderness. There is no guarding.   Musculoskeletal:      Right lower leg: No edema.      Left  lower leg: No edema.   Skin:     General: Skin is warm.      Capillary Refill: Capillary refill takes less than 2 seconds.      Findings: Lesion (Sacral ulcer and skin breakdown around PEG tube; LT hip skin breakdown ) present.   Neurological:      Mental Status: He is alert.   Psychiatric:         Attention and Perception: He is inattentive.         Mood and Affect: Affect is blunt.         Speech: He is noncommunicative.         Behavior: Behavior normal.         Significant Labs: All pertinent labs within the past 24 hours have been reviewed.    Significant Imaging: I have reviewed all pertinent imaging results/findings within the past 24 hours.

## 2022-02-06 NOTE — ASSESSMENT & PLAN NOTE
Patient came from [facility]; will be D/C to [place]   - Vancomycin (Day 7)   - Zosyn 4.5g Q8H (Day 7)  - Blood Cx x2 no growth   - Supplemental O2   - telemetry   - Duo nebs Q8H  - Patient on NC 3L    2/6: Improving. Lung sounds better. Leukocytosis resolving. Afebrile overnight. Continue antibiotics as above.

## 2022-02-06 NOTE — ASSESSMENT & PLAN NOTE
- Continue to monitor BMP   - Increase free water flushes via PEG tube; increased from 250cc to 350cc  - Na today is 150; continues to improve

## 2022-02-06 NOTE — ASSESSMENT & PLAN NOTE
- WBC count today is 13.49; continues to improve   - Patient continues to be on Vancomycin and Zosyn   - Blood cultures negative   - CXR: no significant changes   - Treated for UTI

## 2022-02-06 NOTE — ASSESSMENT & PLAN NOTE
- PEG tube placed, intact  - Patient getting 1/2NS with 5% dextrose as continuous fluids   - PEG tube is feed at a rate of 40ml/hr with 350cc FWF q2hrs

## 2022-02-06 NOTE — SUBJECTIVE & OBJECTIVE
Interval History: Pt examined at bedside this am. He is responsive as he opens his eyes when I greet him but he is nonverbal.     Pt's labs and vitals remain stable. He has been afebrile. Likely d/c tomorrow to the [facility].     Review of Systems   Unable to perform ROS: Patient nonverbal     Objective:     Vital Signs (Most Recent):  Temp: 98.8 °F (37.1 °C) (02/06/22 1030)  Pulse: (!) 113 (02/06/22 1030)  Resp: 18 (02/06/22 1030)  BP: 106/72 (02/06/22 1030)  SpO2: 100 % (02/06/22 1030) Vital Signs (24h Range):  Temp:  [97.3 °F (36.3 °C)-98.8 °F (37.1 °C)] 98.8 °F (37.1 °C)  Pulse:  [] 113  Resp:  [18-20] 18  SpO2:  [95 %-100 %] 100 %  BP: (106-158)/(60-94) 106/72     Weight: 71.6 kg (157 lb 13.6 oz)  Body mass index is 24 kg/m².    Intake/Output Summary (Last 24 hours) at 2/6/2022 1324  Last data filed at 2/6/2022 0500  Gross per 24 hour   Intake 2230 ml   Output --   Net 2230 ml      Physical Exam  Vitals and nursing note reviewed.   Constitutional:       Appearance: He is not ill-appearing, toxic-appearing or diaphoretic.   HENT:      Head: Normocephalic and atraumatic.      Comments: Poor oral hygiene     Nose: Nose normal. No congestion or rhinorrhea.      Mouth/Throat:      Pharynx: No oropharyngeal exudate.   Eyes:      Pupils: Pupils are equal, round, and reactive to light.   Cardiovascular:      Rate and Rhythm: Normal rate and regular rhythm.      Pulses: Normal pulses.      Heart sounds: Normal heart sounds. No murmur heard.  No friction rub.   Pulmonary:      Effort: Pulmonary effort is normal. No respiratory distress.      Breath sounds: Normal breath sounds. No wheezing, rhonchi or rales.   Abdominal:      Tenderness: There is no abdominal tenderness. There is no guarding.   Musculoskeletal:      Right lower leg: No edema.      Left lower leg: No edema.   Skin:     General: Skin is warm.      Capillary Refill: Capillary refill takes less than 2 seconds.      Findings: Lesion (Sacral ulcer and skin  breakdown around PEG tube; LT hip skin breakdown ) present.   Neurological:      Mental Status: He is alert.   Psychiatric:         Attention and Perception: He is inattentive.         Mood and Affect: Affect is blunt.         Speech: He is noncommunicative.         Behavior: Behavior normal.         Significant Labs: All pertinent labs within the past 24 hours have been reviewed.    Significant Imaging: I have reviewed all pertinent imaging results/findings within the past 24 hours.

## 2022-02-06 NOTE — ASSESSMENT & PLAN NOTE
U/A  Positive for UTI.  - Rocephin 2g x1 dose in ED  - Urine Cx grew ESBL    2/6: WBC decreasing to 13.49 from 14.88 on Vancomycin and Zosyn. No fever noted.

## 2022-02-06 NOTE — NURSING
Lying awake in the bed. VS stable. Afebrile. Nepro 1.8 mike tube feeding remains at 40ml/hr. O2 in use. No acute changes during the night

## 2022-02-07 VITALS
OXYGEN SATURATION: 98 % | DIASTOLIC BLOOD PRESSURE: 70 MMHG | SYSTOLIC BLOOD PRESSURE: 104 MMHG | BODY MASS INDEX: 23.93 KG/M2 | RESPIRATION RATE: 16 BRPM | TEMPERATURE: 98 F | WEIGHT: 157.88 LBS | HEART RATE: 108 BPM | HEIGHT: 68 IN

## 2022-02-07 LAB
ANION GAP SERPL CALCULATED.3IONS-SCNC: 10 MMOL/L (ref 7–16)
BASOPHILS # BLD AUTO: 0.02 K/UL (ref 0–0.2)
BASOPHILS NFR BLD AUTO: 0.1 % (ref 0–1)
BUN SERPL-MCNC: 19 MG/DL (ref 7–18)
BUN/CREAT SERPL: 17 (ref 6–20)
CALCIUM SERPL-MCNC: 9.8 MG/DL (ref 8.5–10.1)
CHLORIDE SERPL-SCNC: 107 MMOL/L (ref 98–107)
CO2 SERPL-SCNC: 31 MMOL/L (ref 21–32)
CREAT SERPL-MCNC: 1.11 MG/DL (ref 0.7–1.3)
DIFFERENTIAL METHOD BLD: ABNORMAL
EOSINOPHIL # BLD AUTO: 0.3 K/UL (ref 0–0.5)
EOSINOPHIL NFR BLD AUTO: 2 % (ref 1–4)
ERYTHROCYTE [DISTWIDTH] IN BLOOD BY AUTOMATED COUNT: 21.2 % (ref 11.5–14.5)
GLUCOSE SERPL-MCNC: 102 MG/DL (ref 70–105)
GLUCOSE SERPL-MCNC: 119 MG/DL (ref 70–105)
GLUCOSE SERPL-MCNC: 126 MG/DL (ref 70–105)
GLUCOSE SERPL-MCNC: 133 MG/DL (ref 70–105)
GLUCOSE SERPL-MCNC: 94 MG/DL (ref 74–106)
HCT VFR BLD AUTO: 23.1 % (ref 40–54)
HGB BLD-MCNC: 7.7 G/DL (ref 13.5–18)
IMM GRANULOCYTES # BLD AUTO: 0.25 K/UL (ref 0–0.04)
IMM GRANULOCYTES NFR BLD: 1.7 % (ref 0–0.4)
LYMPHOCYTES # BLD AUTO: 1.47 K/UL (ref 1–4.8)
LYMPHOCYTES NFR BLD AUTO: 10 % (ref 27–41)
MCH RBC QN AUTO: 25.1 PG (ref 27–31)
MCHC RBC AUTO-ENTMCNC: 33.3 G/DL (ref 32–36)
MCV RBC AUTO: 75.2 FL (ref 80–96)
MONOCYTES # BLD AUTO: 0.71 K/UL (ref 0–0.8)
MONOCYTES NFR BLD AUTO: 4.8 % (ref 2–6)
MPC BLD CALC-MCNC: 10.5 FL (ref 9.4–12.4)
NEUTROPHILS # BLD AUTO: 11.97 K/UL (ref 1.8–7.7)
NEUTROPHILS NFR BLD AUTO: 81.4 % (ref 53–65)
NRBC # BLD AUTO: 0 X10E3/UL
NRBC, AUTO (.00): 0 %
PLATELET # BLD AUTO: 341 K/UL (ref 150–400)
POTASSIUM SERPL-SCNC: 3.6 MMOL/L (ref 3.5–5.1)
RBC # BLD AUTO: 3.07 M/UL (ref 4.6–6.2)
SODIUM SERPL-SCNC: 144 MMOL/L (ref 136–145)
WBC # BLD AUTO: 14.72 K/UL (ref 4.5–11)

## 2022-02-07 PROCEDURE — 25000242 PHARM REV CODE 250 ALT 637 W/ HCPCS

## 2022-02-07 PROCEDURE — 99239 HOSP IP/OBS DSCHRG MGMT >30: CPT | Mod: GC,,, | Performed by: INTERNAL MEDICINE

## 2022-02-07 PROCEDURE — 82962 GLUCOSE BLOOD TEST: CPT

## 2022-02-07 PROCEDURE — 25000003 PHARM REV CODE 250

## 2022-02-07 PROCEDURE — 63600175 PHARM REV CODE 636 W HCPCS

## 2022-02-07 PROCEDURE — 85025 COMPLETE CBC W/AUTO DIFF WBC: CPT | Performed by: INTERNAL MEDICINE

## 2022-02-07 PROCEDURE — 27000221 HC OXYGEN, UP TO 24 HOURS

## 2022-02-07 PROCEDURE — 36415 COLL VENOUS BLD VENIPUNCTURE: CPT | Performed by: INTERNAL MEDICINE

## 2022-02-07 PROCEDURE — C9113 INJ PANTOPRAZOLE SODIUM, VIA: HCPCS

## 2022-02-07 PROCEDURE — 63700000 PHARM REV CODE 250 ALT 637 W/O HCPCS: Performed by: INTERNAL MEDICINE

## 2022-02-07 PROCEDURE — 99239 PR HOSPITAL DISCHARGE DAY,>30 MIN: ICD-10-PCS | Mod: GC,,, | Performed by: INTERNAL MEDICINE

## 2022-02-07 PROCEDURE — 94640 AIRWAY INHALATION TREATMENT: CPT

## 2022-02-07 PROCEDURE — 80048 BASIC METABOLIC PNL TOTAL CA: CPT | Performed by: INTERNAL MEDICINE

## 2022-02-07 RX ORDER — SILVER SULFADIAZINE 10 G/1000G
CREAM TOPICAL 2 TIMES DAILY
Qty: 25 G | Refills: 0 | Status: SHIPPED | OUTPATIENT
Start: 2022-02-07

## 2022-02-07 RX ADMIN — POLYETHYLENE GLYCOL 3350 17 G: 17 POWDER, FOR SOLUTION ORAL at 09:02

## 2022-02-07 RX ADMIN — TRAZODONE HYDROCHLORIDE 25 MG: 50 TABLET ORAL at 09:02

## 2022-02-07 RX ADMIN — FOLIC ACID 1 MG: 1 TABLET ORAL at 09:02

## 2022-02-07 RX ADMIN — MULTIVITAMIN 15 ML: LIQUID ORAL at 09:02

## 2022-02-07 RX ADMIN — PIPERACILLIN AND TAZOBACTAM 4.5 G: 4; .5 INJECTION, POWDER, FOR SOLUTION INTRAVENOUS at 09:02

## 2022-02-07 RX ADMIN — TRAZODONE HYDROCHLORIDE 25 MG: 50 TABLET ORAL at 03:02

## 2022-02-07 RX ADMIN — IPRATROPIUM BROMIDE AND ALBUTEROL SULFATE 3 ML: 2.5; .5 SOLUTION RESPIRATORY (INHALATION) at 02:02

## 2022-02-07 RX ADMIN — PANTOPRAZOLE SODIUM 40 MG: 40 INJECTION, POWDER, FOR SOLUTION INTRAVENOUS at 09:02

## 2022-02-07 RX ADMIN — IPRATROPIUM BROMIDE AND ALBUTEROL SULFATE 3 ML: 2.5; .5 SOLUTION RESPIRATORY (INHALATION) at 07:02

## 2022-02-07 RX ADMIN — BENZTROPINE MESYLATE 0.5 MG: 0.5 TABLET ORAL at 09:02

## 2022-02-07 RX ADMIN — FLUCONAZOLE 200 MG: 100 TABLET ORAL at 09:02

## 2022-02-07 NOTE — PLAN OF CARE
Problem: Infection (Pneumonia)  Goal: Resolution of Infection Signs and Symptoms  Outcome: Ongoing, Progressing     Problem: Respiratory Compromise (Pneumonia)  Goal: Effective Oxygenation and Ventilation  Outcome: Ongoing, Progressing     Problem: Infection (Pneumonia)  Goal: Resolution of Infection Signs and Symptoms  Outcome: Ongoing, Progressing     Problem: Respiratory Compromise (Pneumonia)  Goal: Effective Oxygenation and Ventilation  Outcome: Ongoing, Progressing

## 2022-02-07 NOTE — NURSING
Pt. Lying in bed resting with eyes closed. Resp even and unlabored. No resp distress noted. Safety measures ongoing.

## 2022-02-07 NOTE — PLAN OF CARE
SS rec'd consult for low pressure bed at [facility]. Order faxed to [facility] and Eliezer informed. Spoke with MD, informed to please let SS know when pt ready for d/c so SS can inform [facility]. Following.

## 2022-02-07 NOTE — PLAN OF CARE
Problem: Infection  Goal: Absence of Infection Signs and Symptoms  Outcome: Ongoing, Progressing     Problem: Adjustment to Illness (Sepsis/Septic Shock)  Goal: Optimal Coping  Outcome: Ongoing, Progressing     Problem: Glycemic Control Impaired (Sepsis/Septic Shock)  Goal: Blood Glucose Level Within Desired Range  Outcome: Ongoing, Progressing     Problem: Nutrition Impaired (Sepsis/Septic Shock)  Goal: Optimal Nutrition Intake  Outcome: Ongoing, Progressing     Problem: Infection (Pneumonia)  Goal: Resolution of Infection Signs and Symptoms  Outcome: Ongoing, Progressing     Problem: Fall Injury Risk  Goal: Absence of Fall and Fall-Related Injury  Outcome: Ongoing, Progressing     Problem: Impaired Wound Healing  Goal: Optimal Wound Healing  Outcome: Ongoing, Progressing

## 2022-02-07 NOTE — ASSESSMENT & PLAN NOTE
U/A  Positive for UTI.  - Rocephin 2g x1 dose in ED  - Urine Cx grew ESBL    2/6: WBC decreasing to 13.49 from 14.88 on Vancomycin and Zosyn. No fever noted.   2/7: UTI is now treated and patient will have catheter removed before discharging, and it will be replaced at NH

## 2022-02-07 NOTE — HOSPITAL COURSE
Patient is a 61 year old male that presented to [facility] with fever. He was found to have UTI and Healthcare-Associated Pneumonia. He also has a sacral wound and was dehydrated on admission. He received antibiotic therapy for the UTI and pneumonia and wound care treatment for his sacral wound. His fluid status has improved and electrolytes are now WNLs. Patient has received the maximum benefit of this hospitalization and will be discharged to [facility].

## 2022-02-07 NOTE — ASSESSMENT & PLAN NOTE
Patient came from [facility]; will be D/C to [place]   - Vancomycin (Day 7)   - Zosyn 4.5g Q8H (Day 7)  - Blood Cx x2 no growth   - Supplemental O2   - telemetry   - Duo nebs Q8H  - Patient on NC 3L    2/6: Improving. Lung sounds better. Leukocytosis resolving. Afebrile overnight. Continue antibiotics as above.  2/7: Pneumonia is fully treated with antibiotics

## 2022-02-07 NOTE — DISCHARGE SUMMARY
[facility] - [address] [place] [address] Medicine  Discharge Summary      Patient Name: Owen Solis  MRN: [id number]  Patient Class: IP- Inpatient  Admission Date: 1/29/2022  Hospital Length of Stay: 9 days  Discharge Date and Time:  02/07/2022 1:24 PM  Attending Physician: Freya Grant,*   Discharging Provider: Raymond Torres MD  Primary Care Provider: Angelina Jaramillo II, MD      HPI:   Owen Solis is a 61 y.o.m. with a PMHx of HTN, Alzheimer's Dz, metabolic encephalopathy, OA, and dementia that presents to [facility] ED from [facility] for fever, tachypnea, HTN, hypoxia and AMS. In the ED UA was positive for UTI and CXR show bibasilar atelectasis/infiltrates. ED nurse stated patient came into ED with upton cath in place that contain an extensive amount of sediment. Patient was admitted to inpatient family medicine for further care and treatment. According to [facility] patient was ambulatory and could talk, although inappropriate wording, until he contracted COVID-19 in August 2021. Since then patient has been nonverbal and nonambulatory. Patient is fed through a PEG tube due to not being able to swallow. According to NH patient has had to be admitted to the hospital several times recently for PNA. NH also state patient is Full code as did patient's sister. Patient's sister was contacted via phone and she verified what the NH reported.       * No surgery found *      Hospital Course:   Patient is a 61 year old male that presented to [facility] with fever. He was found to have UTI and Healthcare-Associated Pneumonia. He also has a sacral wound and was dehydrated on admission. He received antibiotic therapy for the UTI and pneumonia and wound care treatment for his sacral wound. His fluid status has improved and electrolytes are now WNLs. Patient has received the maximum benefit of this hospitalization and will be discharged to [facility].       Goals of Care Treatment  "Preferences:  Code Status: Full Code      Consults:   Consults (From admission, onward)        Status Ordering Provider     Inpatient consult to Social Work  Once        Provider:  (Not yet assigned)    Completed VENTURA HOPPER     Inpatient consult to Registered Dietitian/Nutritionist  Once        Provider:  (Not yet assigned)    Completed DONATO AGUIRRE     Inpatient consult to Social Work  Once        Provider:  (Not yet assigned)    Completed ERIK STEVENSON     Inpatient consult to Registered Dietitian/Nutritionist  Once        Provider:  (Not yet assigned)    Completed ADELAIDA MACIEL     Pharmacy to dose Vancomycin consult  Once        Provider:  (Not yet assigned)   "And" Linked Group Details    Acknowledged ADELAIDA MACIEL          No new Assessment & Plan notes have been filed under this hospital service since the last note was generated.  Service: Hospital Medicine    Final Active Diagnoses:    Diagnosis Date Noted POA    PRINCIPAL PROBLEM:  UTI (urinary tract infection) [N39.0] 01/29/2022 Unknown    Healthcare-associated pneumonia [J18.9] 01/29/2022 Yes    Dysphagia [R13.10] 09/17/2021 Yes    Pressure injury, unstageable, with suspected deep tissue injury [L89.95] 02/03/2022 Yes    Pressure injury of left hip, stage 1 [L89.221] 02/03/2022 Yes    Pressure ulcer of left leg, stage 2 [L89.892] 02/03/2022 Yes    Leukocytosis [D72.829] 02/03/2022 Unknown    Hypernatremia [E87.0] 02/02/2022 Unknown    Microcytic anemia [D50.9] 02/02/2022 Unknown    OA (osteoarthritis) [M19.90] 01/29/2022 Yes    Primary hypertension [I10] 01/29/2022 Yes    Depression [F32.A] 01/29/2022 Yes    GERD (gastroesophageal reflux disease) [K21.9] 01/29/2022 Yes    Elevated liver enzymes [R74.8] 01/29/2022 Yes    Alzheimer disease [G30.9, F02.80]  Yes    Dehydration [E86.0] 09/13/2021 Yes      Problems Resolved During this Admission:    Diagnosis Date Noted Date Resolved POA    Hypokalemia [E87.6] 09/15/2021 " 02/06/2022 Unknown       Discharged Condition: stable    Disposition: Home or Self Care    Follow Up:   Follow-up Information     Angelina Jaramillo II, MD In 1 week.    Specialty: Family Medicine  Contact information:  [address] [place] [address] [place] [address]  [phone]                       Patient Instructions:      Notify your health care provider if you experience any of the following:  temperature >100.4     Notify your health care provider if you experience any of the following:  persistent nausea and vomiting or diarrhea     Notify your health care provider if you experience any of the following:  severe uncontrolled pain     Notify your health care provider if you experience any of the following:  redness, tenderness, or signs of infection (pain, swelling, redness, odor or green/yellow discharge around incision site)     Notify your health care provider if you experience any of the following:  difficulty breathing or increased cough     Notify your health care provider if you experience any of the following:  severe persistent headache     Notify your health care provider if you experience any of the following:  worsening rash     Notify your health care provider if you experience any of the following:  persistent dizziness, light-headedness, or visual disturbances     Notify your health care provider if you experience any of the following:  increased confusion or weakness     Notify your health care provider if you experience any of the following:       Significant Diagnostic Studies: See above    Pending Diagnostic Studies:     Procedure Component Value Units Date/Time    COVID-19 Routine Screening [[id number]]     Order Status: Sent Lab Status: No result     Specimen: Nasopharyngeal     EXTRA TUBES [[id number]] Collected: 02/03/22 1504    Order Status: Sent Lab Status: In process Updated: 02/03/22 1515    Specimen: Blood, Venous     Narrative:      The following orders were created for  panel order EXTRA TUBES.  Procedure                               Abnormality         Status                     ---------                               -----------         ------                     Lavender Top Hold[[id number]]                                In process                   Please view results for these tests on the individual orders.         Medications:  Reconciled Home Medications:      Medication List      ASK your doctor about these medications    acetaminophen 500 MG tablet  Commonly known as: TYLENOL  500 mg by Per G Tube route every 4 (four) hours as needed for Pain. Give 500 mg via PEG-Tube every four hours as needed for fever     ascorbic acid (vitamin C) 500 MG tablet  Commonly known as: VITAMIN C  500 mg by Per G Tube route once daily.     benztropine 1 MG tablet  Commonly known as: COGENTIN  0.5 mg by Per G Tube route 2 (two) times daily.     cyclobenzaprine 10 MG tablet  Commonly known as: FLEXERIL  10 mg by Per G Tube route 2 (two) times daily as needed for Muscle spasms.     donepeziL 10 MG tablet  Commonly known as: ARICEPT  Take 1 tablet (10 mg total) by mouth every evening.     folic acid 1 MG tablet  Commonly known as: FOLVITE  1 mg by Per G Tube route once daily.     metoprolol succinate 25 MG 24 hr tablet  Commonly known as: TOPROL-XL  Take 25 mg by mouth once daily.     omeprazole 20 MG capsule  Commonly known as: PRILOSEC  20 mg once daily.     traZODone 50 MG tablet  Commonly known as: DESYREL  Take 25 mg by mouth 3 (three) times daily.     zinc 50 mg Tab  50 mg by Per G Tube route once daily.            Indwelling Lines/Drains at time of discharge:   Lines/Drains/Airways     Drain                 Gastrostomy/Enterostomy 09/20/21 1214 Percutaneous endoscopic gastrostomy (PEG) LUQ feeding 140 days         Urethral Catheter 02/06/22 1003 Coude 16 Fr. 1 day                Time spent on the discharge of patient: 45 minutes         Raymond Torres MD  Department of Hospital  Medicine  [facility] - [facility]

## 2022-02-07 NOTE — PLAN OF CARE
[facility] - [facility] Telemetry  Discharge Final Note    Primary Care Provider: Angelina Jaramillo II, MD    Expected Discharge Date: 2/7/2022    Final Discharge Note (most recent)     Final Note - 02/07/22 1434        Final Note    Assessment Type Final Discharge Note     Anticipated Discharge Disposition Skilled Nursing Facility        Post-Acute Status    Post-Acute Authorization Placement     Post-Acute Placement Status Set-up Complete/Auth obtained     Patient choice form signed by patient/caregiver List with quality metrics by geographic area provided;List from CMS Compare;List from System Post-Acute Care     Discharge Delays None known at this time                 Important Message from Medicare  Important Message from Medicare regarding Discharge Appeal Rights: Given to patient/caregiver,Explained to patient/caregiver,Signed/date by patient/caregiver     Date IMM was signed: 01/31/22  Time IMM was signed: 1217    Contact Info     Angelina Jaramillo II, MD   Specialty: Family Medicine   Relationship: PCP - General    [address] [place] [address]  [place] [address]   Phone: [phone]       Next Steps: Follow up in 1 week(s)      pt to d/c to [facility]. D/C summary faxed. No further needs.

## 2022-02-11 ENCOUNTER — HOSPITAL ENCOUNTER (INPATIENT)
Facility: HOSPITAL | Age: 62
LOS: 10 days | Discharge: NURSING, REHAB OR LONG-TERM CARE | DRG: 178 | End: 2022-02-21
Attending: EMERGENCY MEDICINE | Admitting: FAMILY MEDICINE
Payer: MEDICARE

## 2022-02-11 DIAGNOSIS — J69.0 ASPIRATION PNEUMONIA OF LEFT LOWER LOBE, UNSPECIFIED ASPIRATION PNEUMONIA TYPE: Primary | ICD-10-CM

## 2022-02-11 DIAGNOSIS — L89.221 PRESSURE INJURY OF LEFT HIP, STAGE 1: ICD-10-CM

## 2022-02-11 DIAGNOSIS — R00.0 TACHYCARDIA: ICD-10-CM

## 2022-02-11 DIAGNOSIS — N39.0 UTI (URINARY TRACT INFECTION): ICD-10-CM

## 2022-02-11 DIAGNOSIS — K29.00 ACUTE SUPERFICIAL GASTRITIS WITHOUT HEMORRHAGE: ICD-10-CM

## 2022-02-11 DIAGNOSIS — I10 PRIMARY HYPERTENSION: ICD-10-CM

## 2022-02-11 DIAGNOSIS — J18.9 SEPSIS DUE TO PNEUMONIA: ICD-10-CM

## 2022-02-11 DIAGNOSIS — J69.0 ASPIRATION PNEUMONIA OF LEFT LOWER LOBE DUE TO REGURGITATED FOOD: ICD-10-CM

## 2022-02-11 DIAGNOSIS — K21.9 GERD (GASTROESOPHAGEAL REFLUX DISEASE): ICD-10-CM

## 2022-02-11 DIAGNOSIS — L89.892 PRESSURE ULCER OF LEFT LEG, STAGE 2: ICD-10-CM

## 2022-02-11 DIAGNOSIS — R06.02 SHORTNESS OF BREATH: ICD-10-CM

## 2022-02-11 DIAGNOSIS — L89.95 PRESSURE INJURY, UNSTAGEABLE, WITH SUSPECTED DEEP TISSUE INJURY: ICD-10-CM

## 2022-02-11 DIAGNOSIS — R13.11 ORAL PHASE DYSPHAGIA: ICD-10-CM

## 2022-02-11 DIAGNOSIS — A41.9 SEPSIS DUE TO PNEUMONIA: ICD-10-CM

## 2022-02-11 DIAGNOSIS — S31.000D WOUND OF SACRAL REGION, SUBSEQUENT ENCOUNTER: ICD-10-CM

## 2022-02-11 DIAGNOSIS — F32.A DEPRESSION: ICD-10-CM

## 2022-02-11 DIAGNOSIS — G30.9 ALZHEIMER DISEASE: ICD-10-CM

## 2022-02-11 DIAGNOSIS — K94.20 COMPLICATION OF GASTROSTOMY TUBE: ICD-10-CM

## 2022-02-11 DIAGNOSIS — E87.0 HYPERNATREMIA: ICD-10-CM

## 2022-02-11 DIAGNOSIS — J69.0 ASPIRATION PNEUMONIA OF LEFT LOWER LOBE: ICD-10-CM

## 2022-02-11 DIAGNOSIS — E87.0 DEHYDRATION WITH HYPERNATREMIA: ICD-10-CM

## 2022-02-11 DIAGNOSIS — M19.90 OA (OSTEOARTHRITIS): ICD-10-CM

## 2022-02-11 DIAGNOSIS — J18.9 HEALTHCARE-ASSOCIATED PNEUMONIA: ICD-10-CM

## 2022-02-11 DIAGNOSIS — E86.0 DEHYDRATION WITH HYPERNATREMIA: ICD-10-CM

## 2022-02-11 DIAGNOSIS — R13.10 DYSPHAGIA: ICD-10-CM

## 2022-02-11 DIAGNOSIS — S31.000A SACRAL WOUND: ICD-10-CM

## 2022-02-11 DIAGNOSIS — N17.9 AKI (ACUTE KIDNEY INJURY): ICD-10-CM

## 2022-02-11 DIAGNOSIS — F02.80 ALZHEIMER DISEASE: ICD-10-CM

## 2022-02-11 LAB
ALBUMIN SERPL BCP-MCNC: 1.9 G/DL (ref 3.5–5)
ALBUMIN/GLOB SERPL: 0.3 {RATIO}
ALP SERPL-CCNC: 124 U/L (ref 45–115)
ALT SERPL W P-5'-P-CCNC: 30 U/L (ref 16–61)
ANION GAP SERPL CALCULATED.3IONS-SCNC: 18 MMOL/L (ref 7–16)
ANISOCYTOSIS BLD QL SMEAR: ABNORMAL
APTT PPP: 34.3 SECONDS (ref 25.2–37.3)
AST SERPL W P-5'-P-CCNC: 24 U/L (ref 15–37)
BASOPHILS # BLD AUTO: 0.06 K/UL (ref 0–0.2)
BASOPHILS NFR BLD AUTO: 0.2 % (ref 0–1)
BILIRUB SERPL-MCNC: 1.6 MG/DL (ref 0–1.2)
BUN SERPL-MCNC: 36 MG/DL (ref 7–18)
BUN/CREAT SERPL: 16 (ref 6–20)
CALCIUM SERPL-MCNC: 11.6 MG/DL (ref 8.5–10.1)
CHLORIDE SERPL-SCNC: 107 MMOL/L (ref 98–107)
CO2 SERPL-SCNC: 25 MMOL/L (ref 21–32)
CREAT SERPL-MCNC: 2.19 MG/DL (ref 0.7–1.3)
DIFFERENTIAL METHOD BLD: ABNORMAL
EOSINOPHIL # BLD AUTO: 0 K/UL (ref 0–0.5)
EOSINOPHIL NFR BLD AUTO: 0 % (ref 1–4)
ERYTHROCYTE [DISTWIDTH] IN BLOOD BY AUTOMATED COUNT: 21 % (ref 11.5–14.5)
FLUAV AG UPPER RESP QL IA.RAPID: NEGATIVE
FLUBV AG UPPER RESP QL IA.RAPID: NEGATIVE
GLOBULIN SER-MCNC: 5.5 G/DL (ref 2–4)
GLUCOSE SERPL-MCNC: 109 MG/DL (ref 74–106)
HCT VFR BLD AUTO: 24.4 % (ref 40–54)
HGB BLD-MCNC: 7.9 G/DL (ref 13.5–18)
HYPOCHROMIA BLD QL SMEAR: ABNORMAL
IMM GRANULOCYTES # BLD AUTO: 0.32 K/UL (ref 0–0.04)
IMM GRANULOCYTES NFR BLD: 1 % (ref 0–0.4)
INR BLD: 1.31 (ref 0.9–1.1)
LACTATE SERPL-SCNC: 6.9 MMOL/L (ref 0.4–2)
LYMPHOCYTES # BLD AUTO: 0.89 K/UL (ref 1–4.8)
LYMPHOCYTES NFR BLD AUTO: 2.7 % (ref 27–41)
LYMPHOCYTES NFR BLD MANUAL: 3 % (ref 27–41)
MAGNESIUM SERPL-MCNC: 2.3 MG/DL (ref 1.7–2.3)
MCH RBC QN AUTO: 25.6 PG (ref 27–31)
MCHC RBC AUTO-ENTMCNC: 32.4 G/DL (ref 32–36)
MCV RBC AUTO: 79.2 FL (ref 80–96)
MONOCYTES # BLD AUTO: 1.17 K/UL (ref 0–0.8)
MONOCYTES NFR BLD AUTO: 3.6 % (ref 2–6)
MONOCYTES NFR BLD MANUAL: 1 % (ref 2–6)
MPC BLD CALC-MCNC: 9.9 FL (ref 9.4–12.4)
NEUTROPHILS # BLD AUTO: 30 K/UL (ref 1.8–7.7)
NEUTROPHILS NFR BLD AUTO: 92.5 % (ref 53–65)
NEUTS BAND NFR BLD MANUAL: 14 % (ref 1–5)
NEUTS SEG NFR BLD MANUAL: 82 % (ref 50–62)
NRBC # BLD AUTO: 0.07 X10E3/UL
NRBC, AUTO (.00): 0.2 %
NT-PROBNP SERPL-MCNC: 1667 PG/ML (ref 1–125)
OVALOCYTES BLD QL SMEAR: ABNORMAL
PLATELET # BLD AUTO: 440 K/UL (ref 150–400)
PLATELET MORPHOLOGY: ABNORMAL
POLYCHROMASIA BLD QL SMEAR: ABNORMAL
POTASSIUM SERPL-SCNC: 4.4 MMOL/L (ref 3.5–5.1)
PROT SERPL-MCNC: 7.4 G/DL (ref 6.4–8.2)
PROTHROMBIN TIME: 16.2 SECONDS (ref 11.7–14.7)
RBC # BLD AUTO: 3.08 M/UL (ref 4.6–6.2)
SARS-COV+SARS-COV-2 AG RESP QL IA.RAPID: NEGATIVE
SODIUM SERPL-SCNC: 146 MMOL/L (ref 136–145)
TROPONIN I SERPL HS-MCNC: 43.8 PG/ML
WBC # BLD AUTO: 32.44 K/UL (ref 4.5–11)

## 2022-02-11 PROCEDURE — 63600175 PHARM REV CODE 636 W HCPCS: Performed by: EMERGENCY MEDICINE

## 2022-02-11 PROCEDURE — 83735 ASSAY OF MAGNESIUM: CPT | Performed by: EMERGENCY MEDICINE

## 2022-02-11 PROCEDURE — 85610 PROTHROMBIN TIME: CPT | Performed by: EMERGENCY MEDICINE

## 2022-02-11 PROCEDURE — 93005 ELECTROCARDIOGRAM TRACING: CPT

## 2022-02-11 PROCEDURE — 96367 TX/PROPH/DG ADDL SEQ IV INF: CPT

## 2022-02-11 PROCEDURE — 99285 EMERGENCY DEPT VISIT HI MDM: CPT | Mod: 25

## 2022-02-11 PROCEDURE — 84484 ASSAY OF TROPONIN QUANT: CPT | Performed by: EMERGENCY MEDICINE

## 2022-02-11 PROCEDURE — 99223 PR INITIAL HOSPITAL CARE,LEVL III: ICD-10-PCS | Mod: ,,, | Performed by: HOSPITALIST

## 2022-02-11 PROCEDURE — 96368 THER/DIAG CONCURRENT INF: CPT

## 2022-02-11 PROCEDURE — 96366 THER/PROPH/DIAG IV INF ADDON: CPT

## 2022-02-11 PROCEDURE — 11000001 HC ACUTE MED/SURG PRIVATE ROOM

## 2022-02-11 PROCEDURE — 93010 EKG 12-LEAD: ICD-10-PCS | Mod: ,,, | Performed by: INTERNAL MEDICINE

## 2022-02-11 PROCEDURE — 96361 HYDRATE IV INFUSION ADD-ON: CPT

## 2022-02-11 PROCEDURE — 83605 ASSAY OF LACTIC ACID: CPT | Performed by: EMERGENCY MEDICINE

## 2022-02-11 PROCEDURE — 85025 COMPLETE CBC W/AUTO DIFF WBC: CPT | Performed by: EMERGENCY MEDICINE

## 2022-02-11 PROCEDURE — 83880 ASSAY OF NATRIURETIC PEPTIDE: CPT | Performed by: EMERGENCY MEDICINE

## 2022-02-11 PROCEDURE — 63600175 PHARM REV CODE 636 W HCPCS: Performed by: HOSPITALIST

## 2022-02-11 PROCEDURE — 99284 PR EMERGENCY DEPT VISIT,LEVEL IV: ICD-10-PCS | Mod: ,,, | Performed by: EMERGENCY MEDICINE

## 2022-02-11 PROCEDURE — 87428 SARSCOV & INF VIR A&B AG IA: CPT | Performed by: EMERGENCY MEDICINE

## 2022-02-11 PROCEDURE — 96365 THER/PROPH/DIAG IV INF INIT: CPT

## 2022-02-11 PROCEDURE — 87040 BLOOD CULTURE FOR BACTERIA: CPT | Performed by: EMERGENCY MEDICINE

## 2022-02-11 PROCEDURE — 36415 COLL VENOUS BLD VENIPUNCTURE: CPT | Performed by: EMERGENCY MEDICINE

## 2022-02-11 PROCEDURE — 25000003 PHARM REV CODE 250: Performed by: HOSPITALIST

## 2022-02-11 PROCEDURE — 80053 COMPREHEN METABOLIC PANEL: CPT | Performed by: EMERGENCY MEDICINE

## 2022-02-11 PROCEDURE — 99284 EMERGENCY DEPT VISIT MOD MDM: CPT | Mod: ,,, | Performed by: EMERGENCY MEDICINE

## 2022-02-11 PROCEDURE — 93010 ELECTROCARDIOGRAM REPORT: CPT | Mod: ,,, | Performed by: INTERNAL MEDICINE

## 2022-02-11 PROCEDURE — 99223 1ST HOSP IP/OBS HIGH 75: CPT | Mod: ,,, | Performed by: HOSPITALIST

## 2022-02-11 PROCEDURE — 85730 THROMBOPLASTIN TIME PARTIAL: CPT | Performed by: EMERGENCY MEDICINE

## 2022-02-11 PROCEDURE — 25000003 PHARM REV CODE 250: Performed by: EMERGENCY MEDICINE

## 2022-02-11 RX ORDER — PANTOPRAZOLE SODIUM 40 MG/10ML
40 INJECTION, POWDER, LYOPHILIZED, FOR SOLUTION INTRAVENOUS DAILY
Status: DISCONTINUED | OUTPATIENT
Start: 2022-02-12 | End: 2022-02-21 | Stop reason: HOSPADM

## 2022-02-11 RX ORDER — LEVOFLOXACIN 5 MG/ML
500 INJECTION, SOLUTION INTRAVENOUS
Status: DISCONTINUED | OUTPATIENT
Start: 2022-02-12 | End: 2022-02-13

## 2022-02-11 RX ORDER — SODIUM CHLORIDE 450 MG/100ML
INJECTION, SOLUTION INTRAVENOUS CONTINUOUS
Status: DISCONTINUED | OUTPATIENT
Start: 2022-02-12 | End: 2022-02-12

## 2022-02-11 RX ORDER — ACETAMINOPHEN 650 MG/1
650 SUPPOSITORY RECTAL EVERY 6 HOURS PRN
Status: DISCONTINUED | OUTPATIENT
Start: 2022-02-12 | End: 2022-02-21 | Stop reason: HOSPADM

## 2022-02-11 RX ORDER — CLINDAMYCIN PHOSPHATE 600 MG/50ML
600 INJECTION, SOLUTION INTRAVENOUS
Status: DISCONTINUED | OUTPATIENT
Start: 2022-02-12 | End: 2022-02-21 | Stop reason: HOSPADM

## 2022-02-11 RX ORDER — ACETAMINOPHEN 650 MG/1
650 SUPPOSITORY RECTAL
Status: COMPLETED | OUTPATIENT
Start: 2022-02-11 | End: 2022-02-11

## 2022-02-11 RX ADMIN — CLINDAMYCIN PHOSPHATE 600 MG: 600 INJECTION, SOLUTION INTRAVENOUS at 11:02

## 2022-02-11 RX ADMIN — SODIUM CHLORIDE: 4.5 INJECTION, SOLUTION INTRAVENOUS at 11:02

## 2022-02-11 RX ADMIN — PIPERACILLIN AND TAZOBACTAM 4.5 G: 4; .5 INJECTION, POWDER, LYOPHILIZED, FOR SOLUTION INTRAVENOUS; PARENTERAL at 09:02

## 2022-02-11 RX ADMIN — SODIUM CHLORIDE, POTASSIUM CHLORIDE, SODIUM LACTATE AND CALCIUM CHLORIDE 1000 ML: 600; 310; 30; 20 INJECTION, SOLUTION INTRAVENOUS at 09:02

## 2022-02-11 RX ADMIN — CEFEPIME HYDROCHLORIDE 1 G: 1 INJECTION, POWDER, FOR SOLUTION INTRAMUSCULAR; INTRAVENOUS at 11:02

## 2022-02-11 RX ADMIN — SODIUM CHLORIDE, POTASSIUM CHLORIDE, SODIUM LACTATE AND CALCIUM CHLORIDE 1000 ML: 600; 310; 30; 20 INJECTION, SOLUTION INTRAVENOUS at 10:02

## 2022-02-11 RX ADMIN — ACETAMINOPHEN 650 MG: 650 SUPPOSITORY RECTAL at 09:02

## 2022-02-12 PROBLEM — E87.0 HYPERNATREMIA: Status: ACTIVE | Noted: 2022-02-12

## 2022-02-12 PROBLEM — S31.000A SACRAL WOUND: Status: ACTIVE | Noted: 2022-02-12

## 2022-02-12 PROBLEM — R13.10 DYSPHAGIA: Status: ACTIVE | Noted: 2022-02-12

## 2022-02-12 LAB
ANION GAP SERPL CALCULATED.3IONS-SCNC: 16 MMOL/L (ref 7–16)
ANISOCYTOSIS BLD QL SMEAR: ABNORMAL
BASOPHILS # BLD AUTO: 0.04 K/UL (ref 0–0.2)
BASOPHILS NFR BLD AUTO: 0.1 % (ref 0–1)
BUN SERPL-MCNC: 39 MG/DL (ref 7–18)
BUN/CREAT SERPL: 20 (ref 6–20)
CALCIUM SERPL-MCNC: 9.8 MG/DL (ref 8.5–10.1)
CHLORIDE SERPL-SCNC: 105 MMOL/L (ref 98–107)
CO2 SERPL-SCNC: 25 MMOL/L (ref 21–32)
CREAT SERPL-MCNC: 1.96 MG/DL (ref 0.7–1.3)
DACRYOCYTES BLD QL SMEAR: ABNORMAL
DIFFERENTIAL METHOD BLD: ABNORMAL
EOSINOPHIL # BLD AUTO: 0.01 K/UL (ref 0–0.5)
EOSINOPHIL NFR BLD AUTO: 0 % (ref 1–4)
ERYTHROCYTE [DISTWIDTH] IN BLOOD BY AUTOMATED COUNT: 20.9 % (ref 11.5–14.5)
GLUCOSE SERPL-MCNC: 120 MG/DL (ref 74–106)
HCT VFR BLD AUTO: 20.4 % (ref 40–54)
HGB BLD-MCNC: 6.8 G/DL (ref 13.5–18)
HYPOCHROMIA BLD QL SMEAR: ABNORMAL
IMM GRANULOCYTES # BLD AUTO: 0.32 K/UL (ref 0–0.04)
IMM GRANULOCYTES NFR BLD: 1.1 % (ref 0–0.4)
LACTATE SERPL-SCNC: 2.7 MMOL/L (ref 0.4–2)
LACTATE SERPL-SCNC: 5.8 MMOL/L (ref 0.4–2)
LYMPHOCYTES # BLD AUTO: 1.12 K/UL (ref 1–4.8)
LYMPHOCYTES NFR BLD AUTO: 4 % (ref 27–41)
LYMPHOCYTES NFR BLD MANUAL: 9 % (ref 27–41)
MCH RBC QN AUTO: 25.8 PG (ref 27–31)
MCHC RBC AUTO-ENTMCNC: 33.3 G/DL (ref 32–36)
MCV RBC AUTO: 77.3 FL (ref 80–96)
METAMYELOCYTES NFR BLD MANUAL: 1 %
MONOCYTES # BLD AUTO: 0.85 K/UL (ref 0–0.8)
MONOCYTES NFR BLD AUTO: 3 % (ref 2–6)
MONOCYTES NFR BLD MANUAL: 2 % (ref 2–6)
MPC BLD CALC-MCNC: 10.6 FL (ref 9.4–12.4)
MYELOCYTES NFR BLD MANUAL: 1 %
NEUTROPHILS # BLD AUTO: 25.53 K/UL (ref 1.8–7.7)
NEUTROPHILS NFR BLD AUTO: 91.8 % (ref 53–65)
NEUTS BAND NFR BLD MANUAL: 19 % (ref 1–5)
NEUTS SEG NFR BLD MANUAL: 68 % (ref 50–62)
NRBC # BLD AUTO: 0 X10E3/UL
NRBC, AUTO (.00): 0 %
OVALOCYTES BLD QL SMEAR: ABNORMAL
PLATELET # BLD AUTO: 310 K/UL (ref 150–400)
PLATELET MORPHOLOGY: NORMAL
POLYCHROMASIA BLD QL SMEAR: ABNORMAL
POTASSIUM SERPL-SCNC: 4 MMOL/L (ref 3.5–5.1)
RBC # BLD AUTO: 2.64 M/UL (ref 4.6–6.2)
SODIUM SERPL-SCNC: 142 MMOL/L (ref 136–145)
WBC # BLD AUTO: 27.87 K/UL (ref 4.5–11)

## 2022-02-12 PROCEDURE — 25000003 PHARM REV CODE 250: Performed by: HOSPITALIST

## 2022-02-12 PROCEDURE — 63600175 PHARM REV CODE 636 W HCPCS: Performed by: HOSPITALIST

## 2022-02-12 PROCEDURE — 99233 PR SUBSEQUENT HOSPITAL CARE,LEVL III: ICD-10-PCS | Mod: ,,, | Performed by: FAMILY MEDICINE

## 2022-02-12 PROCEDURE — 80048 BASIC METABOLIC PNL TOTAL CA: CPT | Performed by: FAMILY MEDICINE

## 2022-02-12 PROCEDURE — 63600175 PHARM REV CODE 636 W HCPCS: Performed by: FAMILY MEDICINE

## 2022-02-12 PROCEDURE — 99233 SBSQ HOSP IP/OBS HIGH 50: CPT | Mod: ,,, | Performed by: FAMILY MEDICINE

## 2022-02-12 PROCEDURE — C9113 INJ PANTOPRAZOLE SODIUM, VIA: HCPCS | Performed by: HOSPITALIST

## 2022-02-12 PROCEDURE — 36415 COLL VENOUS BLD VENIPUNCTURE: CPT | Performed by: FAMILY MEDICINE

## 2022-02-12 PROCEDURE — 83605 ASSAY OF LACTIC ACID: CPT | Performed by: FAMILY MEDICINE

## 2022-02-12 PROCEDURE — 85025 COMPLETE CBC W/AUTO DIFF WBC: CPT | Performed by: FAMILY MEDICINE

## 2022-02-12 PROCEDURE — 11000001 HC ACUTE MED/SURG PRIVATE ROOM

## 2022-02-12 RX ORDER — HEPARIN SODIUM 5000 [USP'U]/ML
5000 INJECTION, SOLUTION INTRAVENOUS; SUBCUTANEOUS EVERY 12 HOURS
Status: DISCONTINUED | OUTPATIENT
Start: 2022-02-12 | End: 2022-02-21 | Stop reason: HOSPADM

## 2022-02-12 RX ORDER — DEXTROSE MONOHYDRATE 50 MG/ML
INJECTION, SOLUTION INTRAVENOUS CONTINUOUS
Status: DISCONTINUED | OUTPATIENT
Start: 2022-02-12 | End: 2022-02-14

## 2022-02-12 RX ORDER — SODIUM CHLORIDE 0.9 % (FLUSH) 0.9 %
10 SYRINGE (ML) INJECTION EVERY 12 HOURS PRN
Status: DISCONTINUED | OUTPATIENT
Start: 2022-02-12 | End: 2022-02-21 | Stop reason: HOSPADM

## 2022-02-12 RX ORDER — ALBUTEROL SULFATE 0.83 MG/ML
2.5 SOLUTION RESPIRATORY (INHALATION) EVERY 4 HOURS PRN
Status: DISCONTINUED | OUTPATIENT
Start: 2022-02-12 | End: 2022-02-21 | Stop reason: HOSPADM

## 2022-02-12 RX ADMIN — LEVOFLOXACIN 500 MG: 5 INJECTION, SOLUTION INTRAVENOUS at 02:02

## 2022-02-12 RX ADMIN — HEPARIN SODIUM 5000 UNITS: 5000 INJECTION INTRAVENOUS; SUBCUTANEOUS at 09:02

## 2022-02-12 RX ADMIN — CLINDAMYCIN PHOSPHATE 600 MG: 600 INJECTION, SOLUTION INTRAVENOUS at 06:02

## 2022-02-12 RX ADMIN — CLINDAMYCIN PHOSPHATE 600 MG: 600 INJECTION, SOLUTION INTRAVENOUS at 11:02

## 2022-02-12 RX ADMIN — CEFEPIME HYDROCHLORIDE 1 G: 1 INJECTION, POWDER, FOR SOLUTION INTRAMUSCULAR; INTRAVENOUS at 12:02

## 2022-02-12 RX ADMIN — DEXTROSE MONOHYDRATE: 50 INJECTION, SOLUTION INTRAVENOUS at 11:02

## 2022-02-12 RX ADMIN — VANCOMYCIN HYDROCHLORIDE 1250 MG: 1 INJECTION, POWDER, LYOPHILIZED, FOR SOLUTION INTRAVENOUS at 03:02

## 2022-02-12 RX ADMIN — DEXTROSE MONOHYDRATE: 50 INJECTION, SOLUTION INTRAVENOUS at 10:02

## 2022-02-12 RX ADMIN — HEPARIN SODIUM 5000 UNITS: 5000 INJECTION INTRAVENOUS; SUBCUTANEOUS at 08:02

## 2022-02-12 RX ADMIN — PANTOPRAZOLE SODIUM 40 MG: 40 INJECTION, POWDER, FOR SOLUTION INTRAVENOUS at 08:02

## 2022-02-12 RX ADMIN — DEXTROSE MONOHYDRATE: 50 INJECTION, SOLUTION INTRAVENOUS at 02:02

## 2022-02-12 NOTE — ASSESSMENT & PLAN NOTE
EDITH likely secondary to dehydration. Renal function was normal on discharge from last admission, now with Cr 2.19 and GFR 32. Will give IVF with D5W @125/hr and monitor for improvement with repeat BMP.

## 2022-02-12 NOTE — CONSULTS
Pharmacy consulted to dose Vancomycin. Based on pt wt and current renal function, the following therapy will be initiated: Vancomycin 1250 mg IV q 24 hrs. Trough prior 3rd dose. Pharmacy to follow daily and make necessary adjustments. Thank you.

## 2022-02-12 NOTE — ASSESSMENT & PLAN NOTE
Mild hypernatremia of 146, likely secondary to dehydration. Will give IVF as well as free water flushes per PEG for rehydration.

## 2022-02-12 NOTE — HPI
Mr. Solis is a 62yo M who presents from [facility] with shortness of breath and hypoxia.     The patient was recently discharged from [facility] on 2/7 after being treated for UTI, HCAP believed to be secondary to aspiration, and dehydration. He presents similarly today. Patient has had failure to thrive post-COVID and currently has a PEG for tube feedings. During last admission, patient was suctioned and food remnants were found. Upon suctioning on arrival today they were found again. Patient has known dysphagia only receiving PEG feeds and was unable to pass a swallow evaluation during the last hospitalization. Patient turns his head from me during exam but does not otherwise respond. Patient has a PMH of Alzheimer's dementia, COVID-19, HTN.     On arrival, patient was febrile to 102.9 and tachycardic to 140. EKG with sinus tachycardia. Leukocytosis of 32, Na 146, BUN/Cr 36/2.19 with GFR 32. CXR with a large L sided PNA. Lactic acid initially 6.9 and repeat 5.8. Patient has received 2L NS bolus and zosyn in the ED. Will admit patient to the hospitalist service for further IV antibiotics and IVF resuscitation.

## 2022-02-12 NOTE — ED NOTES
Called patient's sister that is listed as his first contact in nursing home documents; Lesli Solis- she states that the family wishes for Mr Owen Solis to remain a full code.

## 2022-02-12 NOTE — SUBJECTIVE & OBJECTIVE
Interval History:  Admitted last night with aspiration pneumonia and sacral ulcer.  Patient non verbal.     Review of Systems   Unable to perform ROS: Patient nonverbal     Objective:     Vital Signs (Most Recent):  Temp: 99.7 °F (37.6 °C) (02/12/22 0326)  Pulse: (!) 117 (02/12/22 1443)  Resp: (!) 35 (02/12/22 1443)  BP: 116/67 (02/12/22 1443)  SpO2: 100 % (02/12/22 1443) Vital Signs (24h Range):  Temp:  [99.7 °F (37.6 °C)-102.9 °F (39.4 °C)] 99.7 °F (37.6 °C)  Pulse:  [111-148] 117  Resp:  [20-60] 35  SpO2:  [88 %-100 %] 100 %  BP: ()/() 116/67     Weight: 65.8 kg (145 lb)  Body mass index is 22.05 kg/m².    Intake/Output Summary (Last 24 hours) at 2/12/2022 1558  Last data filed at 2/12/2022 1413  Gross per 24 hour   Intake 3882.66 ml   Output --   Net 3882.66 ml      Physical Exam  Vitals reviewed.   Constitutional:       Comments: Cachectic, frail, ill appearing   HENT:      Head: Normocephalic and atraumatic.      Mouth/Throat:      Mouth: Mucous membranes are dry.   Eyes:      General: No scleral icterus.     Extraocular Movements: Extraocular movements intact.      Pupils: Pupils are equal, round, and reactive to light.   Cardiovascular:      Rate and Rhythm: Regular rhythm. Tachycardia present.      Heart sounds: Normal heart sounds.   Pulmonary:      Breath sounds: Rhonchi present. No wheezing or rales.      Comments: Poor inspiratory effort  Abdominal:      Tenderness: There is no abdominal tenderness. There is no guarding or rebound.      Comments: PEG tube in place   Musculoskeletal:      Right lower leg: No edema.      Left lower leg: No edema.   Skin:     General: Skin is warm and dry.      Findings: Lesion present.      Comments: Sacral ulcer, unstageable.         Significant Labs:   All pertinent labs within the past 24 hours have been reviewed.  BMP:   Recent Labs   Lab 02/11/22 2130 02/11/22 2130 02/12/22  0543   *   < > 120*   *   < > 142   K 4.4   < > 4.0      < >  105   CO2 25   < > 25   BUN 36*   < > 39*   CREATININE 2.19*   < > 1.96*   CALCIUM 11.6*   < > 9.8   MG 2.3  --   --     < > = values in this interval not displayed.     CBC:   Recent Labs   Lab 02/11/22  2130 02/12/22  0543   WBC 32.44* 27.87*   HGB 7.9* 6.8*   HCT 24.4* 20.4*   * 310       Significant Imaging: I have reviewed all pertinent imaging results/findings within the past 24 hours.

## 2022-02-12 NOTE — PROGRESS NOTES
[facility] Emergency Department  Hospital Medicine  Progress Note    Patient Name: Owen Solis  MRN: [id number]  Patient Class: IP- Inpatient   Admission Date: 2/11/2022  Length of Stay: 1 days  Attending Physician: Yfn Richardson Jr., MD  Primary Care Provider: Angelina Jaramillo II, MD        Subjective:     Principal Problem:Aspiration pneumonia of left lower lobe        HPI:    Mr. Solis is a 60yo M who presents from [facility] with shortness of breath and hypoxia.     The patient was recently discharged from [facility] on 2/7 after being treated for UTI, HCAP believed to be secondary to aspiration, and dehydration. He presents similarly today. Patient has had failure to thrive post-COVID and currently has a PEG for tube feedings. During last admission, patient was suctioned and food remnants were found. Upon suctioning on arrival today they were found again. Patient has known dysphagia only receiving PEG feeds and was unable to pass a swallow evaluation during the last hospitalization. Patient turns his head from me during exam but does not otherwise respond. Patient has a PMH of Alzheimer's dementia, COVID-19, HTN.     On arrival, patient was febrile to 102.9 and tachycardic to 140. EKG with sinus tachycardia. Leukocytosis of 32, Na 146, BUN/Cr 36/2.19 with GFR 32. CXR with a large L sided PNA. Lactic acid initially 6.9 and repeat 5.8. Patient has received 2L NS bolus and zosyn in the ED. Will admit patient to the hospitalist service for further IV antibiotics and IVF resuscitation.       Overview/Hospital Course:  No notes on file    Interval History:  Admitted last night with aspiration pneumonia and sacral ulcer.  Patient non verbal.     Review of Systems   Unable to perform ROS: Patient nonverbal     Objective:     Vital Signs (Most Recent):  Temp: 99.7 °F (37.6 °C) (02/12/22 0326)  Pulse: (!) 117 (02/12/22 1443)  Resp: (!) 35 (02/12/22 1443)  BP: 116/67 (02/12/22 1443)  SpO2: 100 % (02/12/22 1443) Vital  Signs (24h Range):  Temp:  [99.7 °F (37.6 °C)-102.9 °F (39.4 °C)] 99.7 °F (37.6 °C)  Pulse:  [111-148] 117  Resp:  [20-60] 35  SpO2:  [88 %-100 %] 100 %  BP: ()/() 116/67     Weight: 65.8 kg (145 lb)  Body mass index is 22.05 kg/m².    Intake/Output Summary (Last 24 hours) at 2/12/2022 1558  Last data filed at 2/12/2022 1413  Gross per 24 hour   Intake 3882.66 ml   Output --   Net 3882.66 ml      Physical Exam  Vitals reviewed.   Constitutional:       Comments: Cachectic, frail, ill appearing   HENT:      Head: Normocephalic and atraumatic.      Mouth/Throat:      Mouth: Mucous membranes are dry.   Eyes:      General: No scleral icterus.     Extraocular Movements: Extraocular movements intact.      Pupils: Pupils are equal, round, and reactive to light.   Cardiovascular:      Rate and Rhythm: Regular rhythm. Tachycardia present.      Heart sounds: Normal heart sounds.   Pulmonary:      Breath sounds: Rhonchi present. No wheezing or rales.      Comments: Poor inspiratory effort  Abdominal:      Tenderness: There is no abdominal tenderness. There is no guarding or rebound.      Comments: PEG tube in place   Musculoskeletal:      Right lower leg: No edema.      Left lower leg: No edema.   Skin:     General: Skin is warm and dry.      Findings: Lesion present.      Comments: Sacral ulcer, unstageable.         Significant Labs:   All pertinent labs within the past 24 hours have been reviewed.  BMP:   Recent Labs   Lab 02/11/22 2130 02/11/22 2130 02/12/22  0543   *   < > 120*   *   < > 142   K 4.4   < > 4.0      < > 105   CO2 25   < > 25   BUN 36*   < > 39*   CREATININE 2.19*   < > 1.96*   CALCIUM 11.6*   < > 9.8   MG 2.3  --   --     < > = values in this interval not displayed.     CBC:   Recent Labs   Lab 02/11/22 2130 02/12/22  0543   WBC 32.44* 27.87*   HGB 7.9* 6.8*   HCT 24.4* 20.4*   * 310       Significant Imaging: I have reviewed all pertinent imaging results/findings  within the past 24 hours.      Assessment/Plan:      * Aspiration pneumonia of left lower lobe  Large L lobar PNA on CXR. Patient hypoxic requiring supplemental O2. Febrile to 102.9 with leukocytosis of 32 on admission. Lactic acid 6.9 -> 5.8. Concern for aspiration as food particles were suctioned out on arrival.      - Abx: Vancomycin, cefepime, clindamycin, levaquin  - Will repeat lactic acid after IVF resuscitation  - Blood cultures pending   - Supplemental oxygen PRN  - NPO     EDITH (acute kidney injury)  EDITH likely secondary to dehydration. Renal function was normal on discharge from last admission, now with Cr 2.19 and GFR 32. Will give IVF with D5W @125/hr and monitor for improvement with repeat BMP.     Sacral wound  - Wound care consulted for this unstageable wound.       Hypernatremia  Mild hypernatremia of 146, likely secondary to dehydration. Will give IVF as well as free water flushes per PEG for rehydration.     Dysphagia  - Patient with known dysphagia and continued concern for aspiration PNA   - PEG in place  - Nutrition consult for tube feeds  - NPO       VTE Risk Mitigation (From admission, onward)         Ordered     heparin (porcine) injection 5,000 Units  Every 12 hours         02/12/22 0203     IP VTE LOW RISK PATIENT  Once         02/12/22 0203     Place sequential compression device  Until discontinued         02/12/22 0203                Discharge Planning   ESTEFANY:      Code Status: Full Code   Is the patient medically ready for discharge?:     Reason for patient still in hospital (select all that apply): Treatment                     Yfn Richardson Jr, MD  Department of Hospital Medicine   [facility] - Emergency Department

## 2022-02-12 NOTE — ED PROVIDER NOTES
Encounter Date: 2/11/2022    SCRIBE #1 NOTE: I, Kevin Quick, am scribing for, and in the presence of,  Dr. Landon Pagan. I have scribed the entire note.       History     Chief Complaint   Patient presents with    Shortness of Breath     A 61 year old  male, with PMH of Alzheimer's Disease; hypertension; and arthritis, presents to the emergency department, via EMS from the [facility], with complaints of shortness of breath. EMS reports that the patient was at the [facility] being fed through his PEG tube and he aspirated. Upon presentation to [facility], patient' SpO2 at 96% on 15L of oxygen via nasal canula. It is also noted that the patient's temperature is 102.9F on arrival. EMS denies any diarrhea, leg swelling, syncope, or any other associated symptoms of the patient. No history is reported by the patient due to his current condition. No other remitting or exacerbating factors are reported at this time.    The history is provided by the EMS personnel. No  was used.     Review of patient's allergies indicates:   Allergen Reactions    Amitriptyline      Past Medical History:   Diagnosis Date    Alzheimer disease     Alzheimer's dementia     Arthritis     Complication of gastrostomy 2/17/2022    Hypertension     Metabolic encephalopathy      Past Surgical History:   Procedure Laterality Date    GASTROSTOMY TUBE PLACEMENT       Family History   Problem Relation Age of Onset    Hypertension Mother     Heart disease Mother     COPD Father     Hypertension Sister      Social History     Tobacco Use    Smoking status: Never Smoker    Smokeless tobacco: Never Used   Substance Use Topics    Alcohol use: Not Currently    Drug use: Never     Review of Systems   Constitutional: Positive for fever.   Respiratory: Positive for shortness of breath (Aspiration).    Gastrointestinal: Negative for diarrhea.   Neurological: Negative for syncope.   All other systems reviewed  and are negative.      Physical Exam     Initial Vitals [02/11/22 2057]   BP Pulse Resp Temp SpO2   (!) 190/107 (!) 140 (!) 40 (!) 102.9 °F (39.4 °C) 96 %      MAP       --         Physical Exam    Nursing note and vitals reviewed.  Constitutional: He appears well-developed and well-nourished. Nasal cannula in place.   HENT:   Head: Normocephalic and atraumatic.   Eyes: EOM are normal. Pupils are equal, round, and reactive to light.   Neck: Neck supple. No thyromegaly present.   Cardiovascular: Regular rhythm, normal heart sounds and intact distal pulses. Tachycardia present.    No murmur heard.  Pulmonary/Chest: No respiratory distress. He has rales (Bilateral lung fields).   Abdominal: Abdomen is soft. Ostomy site is clean. There is no abdominal tenderness.   PEG tube noted in the RUQ.   Musculoskeletal:         General: No edema.      Cervical back: Neck supple.     Neurological: He is alert. No sensory deficit.   Patient is alert, but orientation status is unable to be assessed. Patient exhibits a good gag reflex.   Skin: Skin is warm and dry. Capillary refill takes less than 2 seconds. No rash noted.   Psychiatric:   Unable to assess due to patient's status.         ED Course   Procedures  Labs Reviewed   COMPREHENSIVE METABOLIC PANEL - Abnormal; Notable for the following components:       Result Value    Sodium 146 (*)     Anion Gap 18 (*)     Glucose 109 (*)     BUN 36 (*)     Creatinine 2.19 (*)     Calcium 11.6 (*)     Albumin 1.9 (*)     Globulin 5.5 (*)     Bilirubin, Total 1.6 (*)     Alk Phos 124 (*)     eGFR 32 (*)     All other components within normal limits   PROTIME-INR - Abnormal; Notable for the following components:    PT 16.2 (*)     INR 1.31 (*)     All other components within normal limits   LACTIC ACID, PLASMA - Abnormal; Notable for the following components:    Lactic Acid 6.9 (*)     All other components within normal limits   NT-PRO NATRIURETIC PEPTIDE - Abnormal; Notable for the  following components:    ProBNP 1,667 (*)     All other components within normal limits   CBC WITH DIFFERENTIAL - Abnormal; Notable for the following components:    WBC 32.44 (*)     RBC 3.08 (*)     Hemoglobin 7.9 (*)     Hematocrit 24.4 (*)     MCV 79.2 (*)     MCH 25.6 (*)     RDW 21.0 (*)     Platelet Count 440 (*)     Neutrophils % 92.5 (*)     Lymphocytes % 2.7 (*)     Eosinophils % 0.0 (*)     Immature Granulocytes % 1.0 (*)     nRBC, Auto 0.2 (*)     Neutrophils, Abs 30.00 (*)     Lymphocytes, Absolute 0.89 (*)     Monocytes, Absolute 1.17 (*)     Immature Granulocytes, Absolute 0.32 (*)     nRBC, Absolute 0.07 (*)     All other components within normal limits   MANUAL DIFFERENTIAL - Abnormal; Notable for the following components:    Segmented Neutrophils, Man % 82 (*)     Bands, Man % 14 (*)     Lymphocytes, Man % 3 (*)     Monocytes, Man % 1 (*)     Platelet Morphology Increased (*)     All other components within normal limits   LACTIC ACID, PLASMA - Abnormal; Notable for the following components:    Lactic Acid 5.8 (*)     All other components within normal limits   BASIC METABOLIC PANEL - Abnormal; Notable for the following components:    Glucose 120 (*)     BUN 39 (*)     Creatinine 1.96 (*)     eGFR 37 (*)     All other components within normal limits   LACTIC ACID, PLASMA - Abnormal; Notable for the following components:    Lactic Acid 2.7 (*)     All other components within normal limits   CBC WITH DIFFERENTIAL - Abnormal; Notable for the following components:    WBC 27.87 (*)     RBC 2.64 (*)     Hemoglobin 6.8 (*)     Hematocrit 20.4 (*)     MCV 77.3 (*)     MCH 25.8 (*)     RDW 20.9 (*)     Neutrophils % 91.8 (*)     Lymphocytes % 4.0 (*)     Eosinophils % 0.0 (*)     Immature Granulocytes % 1.1 (*)     Neutrophils, Abs 25.53 (*)     Monocytes, Absolute 0.85 (*)     Immature Granulocytes, Absolute 0.32 (*)     All other components within normal limits   MANUAL DIFFERENTIAL - Abnormal; Notable for  the following components:    Segmented Neutrophils, Man % 68 (*)     Bands, Man % 19 (*)     Lymphocytes, Man % 9 (*)     All other components within normal limits   APTT - Normal   MAGNESIUM - Normal   SARS-COV2 (COVID) W/ FLU ANTIGEN - Normal    Narrative:     Negative SARS-CoV results should not be used as the sole basis for treatment or patient management decisions; negative results should be considered in the context of a patient's recent exposures, history and the presene of clinical signs and symptoms consistent with COVID-19.  Negative results should be treated as presumptive and confirmed by molecular assay, if necessary for patient management.   TROPONIN I - Normal   CBC W/ AUTO DIFFERENTIAL    Narrative:     The following orders were created for panel order CBC auto differential.  Procedure                               Abnormality         Status                     ---------                               -----------         ------                     CBC with Differential[[id number]]        Abnormal            Final result               Manual Differential[[id number]]          Abnormal            Final result                 Please view results for these tests on the individual orders.   CBC W/ AUTO DIFFERENTIAL    Narrative:     The following orders were created for panel order CBC Auto Differential.  Procedure                               Abnormality         Status                     ---------                               -----------         ------                     CBC with Differential[[id number]]        Abnormal            Final result               Manual Differential[[id number]]          Abnormal            Final result                 Please view results for these tests on the individual orders.     EKG Readings: (Independently Interpreted)   Initial Reading: No STEMI.   Sinus Tachycardia. Rate: 142. Interpreted by Dr. Pagan.     ECG Results          EKG 12-lead (Final result)  Result time  02/18/22 09:50:22    Final result by Interface, Lab In [facility] (02/18/22 09:50:22)                 Narrative:    Test Reason : R00.0,    Vent. Rate : 142 BPM     Atrial Rate : 000 BPM     P-R Int : 000 ms          QRS Dur : 090 ms      QT Int : 314 ms       P-R-T Axes : 000 022 054 degrees     QTc Int : 458 ms    Probable sinus tachycardia  Possible sequence error: V2,V3 omitted  Anterolateral ST-T abnormality  may be due to myocardial ischemia  Abnormal ECG    Confirmed by Yoel Poole MD ([id number]) on 2/18/2022 9:50:14 AM    Referred By: PRICE   SELF           Confirmed By:Yoel Poole MD                            Imaging Results          X-Ray Chest AP Portable (Final result)  Result time 02/12/22 07:53:18    Final result by Amrbocio Styles MD (02/12/22 07:53:18)                 Impression:      Interval worsening of the left basilar pneumonia      Electronically signed by: Ambrocio Styles  Date:    02/12/2022  Time:    07:53             Narrative:    EXAMINATION:  XR CHEST AP PORTABLE    CLINICAL HISTORY:  .  Shortness of breath    COMPARISON:  February 2, 2022    TECHNIQUE:  AP portable semi erect chest    FINDINGS:  The cardiac silhouette is not enlarged.  There is no mediastinal mass.  There is no pulmonary vascular engorgement.    There is patchy and hazy airspace disease in the left mid to lower lung.  There is mild platelike atelectasis in the right lung base.  The left basilar infiltrate has worsened in the interval.    Osseous structures are unchanged.                                 Medications   acetaminophen suppository 650 mg (has no administration in time range)   ceFEPIme (MAXIPIME) 1 g in dextrose 5 % in water (D5W) 5 % 50 mL IVPB (MB+) (0 g Intravenous Stopped 2/18/22 1259)   clindamycin in D5W 600 mg/50 mL IVPB 600 mg (0 mg Intravenous Stopped 2/18/22 1804)   pantoprazole injection 40 mg (40 mg Intravenous Given 2/18/22 5229)   sodium chloride 0.9% flush 10 mL (has no  administration in time range)   heparin (porcine) injection 5,000 Units (5,000 Units Subcutaneous Given 2/18/22 2122)   albuterol nebulizer solution 2.5 mg (has no administration in time range)   levoFLOXacin 500 mg/100 mL IVPB 500 mg (500 mg Intravenous New Bag 2/18/22 2121)   vancomycin (VANCOCIN) 1,250 mg in dextrose 5 % 250 mL IVPB (0 mg Intravenous Stopped 2/18/22 0015)   dextrose 5 % and 0.45 % NaCl with KCl 20 mEq infusion ( Intravenous New Bag 2/18/22 1512)   sodium chloride 0.9% flush 10 mL (has no administration in time range)   0.9%  NaCl infusion (for blood administration) ( Intravenous Given 2/17/22 2230)   acetaminophen suppository 650 mg (650 mg Rectal Given 2/11/22 2106)   piperacillin-tazobactam (ZOSYN) 4.5 g in dextrose 5 % in water (D5W) 5 % 100 mL IVPB (MB+) (0 g Intravenous Stopped 2/11/22 2222)   lactated ringers bolus 1,000 mL (0 mLs Intravenous Stopped 2/11/22 2222)   lactated ringers bolus 1,000 mL (0 mLs Intravenous Stopped 2/11/22 2351)              Scribe Attestation:   Scribe #1: I performed the above scribed service and the documentation accurately describes the services I performed. I attest to the accuracy of the note.    Attending Attestation:           Physician Attestation for Scribe:  Physician Attestation Statement for Scribe #1: I, Javed Pagan MD, reviewed documentation, as scribed by Kevin Quick in my presence, and it is both accurate and complete.                      Clinical Impression:   Final diagnoses:  [R00.0] Tachycardia  [R06.02] Shortness of breath  [J69.0] Aspiration pneumonia of left lower lobe, unspecified aspiration pneumonia type (Primary)  [J18.9, A41.9] Sepsis due to pneumonia  [J69.0] Aspiration pneumonia of left lower lobe due to regurgitated food  [N17.9] EDITH (acute kidney injury)  [R13.11] Oral phase dysphagia  [S31.000D] Wound of sacral region, subsequent encounter  [E86.0, E87.0] Dehydration with hypernatremia          ED Disposition Condition     Admit               Javed Pagan MD  02/18/22 7106

## 2022-02-12 NOTE — ASSESSMENT & PLAN NOTE
- Patient with known dysphagia and continued concern for aspiration PNA   - PEG in place  - Nutrition consult for tube feeds  - NPO

## 2022-02-12 NOTE — ASSESSMENT & PLAN NOTE
Large L lobar PNA on CXR. Patient hypoxic requiring supplemental O2. Febrile to 102.9 with leukocytosis of 32 on admission. Lactic acid 6.9 -> 5.8. Concern for aspiration as food particles were suctioned out on arrival.      - Abx: Vancomycin, cefepime, clindamycin, levaquin  - Will repeat lactic acid after IVF resuscitation  - Blood cultures pending   - Supplemental oxygen PRN  - NPO

## 2022-02-12 NOTE — SUBJECTIVE & OBJECTIVE
Past Medical History:   Diagnosis Date    Alzheimer disease     Alzheimer's dementia     Arthritis     Hypertension     Metabolic encephalopathy        Past Surgical History:   Procedure Laterality Date    GASTROSTOMY TUBE PLACEMENT         Review of patient's allergies indicates:   Allergen Reactions    Amitriptyline        No current facility-administered medications on file prior to encounter.     Current Outpatient Medications on File Prior to Encounter   Medication Sig    acetaminophen (TYLENOL) 500 MG tablet 500 mg by Per G Tube route every 4 (four) hours as needed for Pain. Give 500 mg via PEG-Tube every four hours as needed for fever    benztropine (COGENTIN) 1 MG tablet 0.5 mg by Per G Tube route 2 (two) times daily.    cyclobenzaprine (FLEXERIL) 10 MG tablet 10 mg by Per G Tube route 2 (two) times daily as needed for Muscle spasms.    donepeziL (ARICEPT) 10 MG tablet Take 1 tablet (10 mg total) by mouth every evening.    folic acid (FOLVITE) 1 MG tablet 1 mg by Per G Tube route once daily.    metoprolol succinate (TOPROL-XL) 25 MG 24 hr tablet Take 25 mg by mouth once daily.    multivit-min-ferrous gluconate 9 mg iron/15 mL oral liquid Take 15 mLs by mouth once daily.    omeprazole (PRILOSEC) 20 MG capsule 20 mg once daily.    silver sulfADIAZINE 1% (SILVADENE) 1 % cream Apply topically 2 (two) times daily. Apply to sacral wound two times daily    traZODone (DESYREL) 50 MG tablet Take 25 mg by mouth 3 (three) times daily.     Family History     Problem Relation (Age of Onset)    COPD Father    Heart disease Mother    Hypertension Mother, Sister        Tobacco Use    Smoking status: Never Smoker    Smokeless tobacco: Never Used   Substance and Sexual Activity    Alcohol use: Not Currently    Drug use: Never    Sexual activity: Not Currently     Review of Systems   Unable to perform ROS: Dementia     Objective:     Vital Signs (Most Recent):  Temp: (!) 100.7 °F (38.2 °C) (02/12/22  0210)  Pulse: (!) 131 (02/12/22 0042)  Resp: (!) 37 (02/12/22 0042)  BP: 117/67 (02/12/22 0042)  SpO2: 100 % (02/12/22 0042) Vital Signs (24h Range):  Temp:  [100.7 °F (38.2 °C)-102.9 °F (39.4 °C)] 100.7 °F (38.2 °C)  Pulse:  [131-148] 131  Resp:  [36-60] 37  SpO2:  [95 %-100 %] 100 %  BP: ()/() 117/67     Weight: 65.8 kg (145 lb)  Body mass index is 22.05 kg/m².    Physical Exam  Vitals reviewed.   Constitutional:       Comments: Cachectic, frail, ill appearing   HENT:      Head: Normocephalic and atraumatic.      Mouth/Throat:      Mouth: Mucous membranes are dry.   Eyes:      General: No scleral icterus.     Extraocular Movements: Extraocular movements intact.      Pupils: Pupils are equal, round, and reactive to light.   Cardiovascular:      Rate and Rhythm: Regular rhythm. Tachycardia present.      Heart sounds: Normal heart sounds.   Pulmonary:      Breath sounds: Rhonchi present. No wheezing or rales.      Comments: Poor inspiratory effort  Abdominal:      Tenderness: There is no abdominal tenderness. There is no guarding or rebound.      Comments: PEG tube in place   Musculoskeletal:      Right lower leg: No edema.      Left lower leg: No edema.   Skin:     General: Skin is warm and dry.      Findings: Lesion present.      Comments: Sacral ulcer   Neurological:      Comments: Patient turns away during exam but does not open eyes to command, follow direction, answer questions. Unable to perform neuro exam           CRANIAL NERVES     CN III, IV, VI   Pupils are equal, round, and reactive to light.       Significant Labs:   All pertinent labs within the past 24 hours have been reviewed.  CBC:   Recent Labs   Lab 02/11/22 2130   WBC 32.44*   HGB 7.9*   HCT 24.4*   *     CMP:   Recent Labs   Lab 02/11/22 2130   *   K 4.4      CO2 25   *   BUN 36*   CREATININE 2.19*   CALCIUM 11.6*   PROT 7.4   ALBUMIN 1.9*   BILITOT 1.6*   ALKPHOS 124*   AST 24   ALT 30   ANIONGAP 18*    EGFRNONAA 32*     Lactic Acid:   Recent Labs   Lab 02/11/22  2130 02/11/22  2353   LACTATE 6.9* 5.8*       Significant Imaging: I have reviewed all pertinent imaging results/findings within the past 24 hours.

## 2022-02-12 NOTE — H&P
[facility] Emergency Department  Hospital Medicine  History & Physical    Patient Name: Owen Solis  MRN: [id number]  Patient Class: IP- Inpatient  Admission Date: 2/11/2022  Attending Physician: Yfn Richardson Jr., MD   Primary Care Provider: Angelina Jaramillo II, MD         Patient information was obtained from past medical records and ER records.     Subjective:     Principal Problem:Aspiration pneumonia of left lower lobe    Chief Complaint:   Chief Complaint   Patient presents with    Shortness of Breath        HPI:   Mr. Solis is a 62yo M who presents from [facility] with shortness of breath and hypoxia.     The patient was recently discharged from [facility] on 2/7 after being treated for UTI, HCAP believed to be secondary to aspiration, and dehydration. He presents similarly today. Patient has had failure to thrive post-COVID and currently has a PEG for tube feedings. During last admission, patient was suctioned and food remnants were found. Upon suctioning on arrival today they were found again. Patient has known dysphagia only receiving PEG feeds and was unable to pass a swallow evaluation during the last hospitalization. Patient turns his head from me during exam but does not otherwise respond. Patient has a PMH of Alzheimer's dementia, COVID-19, HTN.     On arrival, patient was febrile to 102.9 and tachycardic to 140. EKG with sinus tachycardia. Leukocytosis of 32, Na 146, BUN/Cr 36/2.19 with GFR 32. CXR with a large L sided PNA. Lactic acid initially 6.9 and repeat 5.8. Patient has received 2L NS bolus and zosyn in the ED. Will admit patient to the hospitalist service for further IV antibiotics and IVF resuscitation.       Past Medical History:   Diagnosis Date    Alzheimer disease     Alzheimer's dementia     Arthritis     Hypertension     Metabolic encephalopathy        Past Surgical History:   Procedure Laterality Date    GASTROSTOMY TUBE PLACEMENT         Review of patient's allergies  indicates:   Allergen Reactions    Amitriptyline        No current facility-administered medications on file prior to encounter.     Current Outpatient Medications on File Prior to Encounter   Medication Sig    acetaminophen (TYLENOL) 500 MG tablet 500 mg by Per G Tube route every 4 (four) hours as needed for Pain. Give 500 mg via PEG-Tube every four hours as needed for fever    benztropine (COGENTIN) 1 MG tablet 0.5 mg by Per G Tube route 2 (two) times daily.    cyclobenzaprine (FLEXERIL) 10 MG tablet 10 mg by Per G Tube route 2 (two) times daily as needed for Muscle spasms.    donepeziL (ARICEPT) 10 MG tablet Take 1 tablet (10 mg total) by mouth every evening.    folic acid (FOLVITE) 1 MG tablet 1 mg by Per G Tube route once daily.    metoprolol succinate (TOPROL-XL) 25 MG 24 hr tablet Take 25 mg by mouth once daily.    multivit-min-ferrous gluconate 9 mg iron/15 mL oral liquid Take 15 mLs by mouth once daily.    omeprazole (PRILOSEC) 20 MG capsule 20 mg once daily.    silver sulfADIAZINE 1% (SILVADENE) 1 % cream Apply topically 2 (two) times daily. Apply to sacral wound two times daily    traZODone (DESYREL) 50 MG tablet Take 25 mg by mouth 3 (three) times daily.     Family History     Problem Relation (Age of Onset)    COPD Father    Heart disease Mother    Hypertension Mother, Sister        Tobacco Use    Smoking status: Never Smoker    Smokeless tobacco: Never Used   Substance and Sexual Activity    Alcohol use: Not Currently    Drug use: Never    Sexual activity: Not Currently     Review of Systems   Unable to perform ROS: Dementia     Objective:     Vital Signs (Most Recent):  Temp: (!) 100.7 °F (38.2 °C) (02/12/22 0210)  Pulse: (!) 131 (02/12/22 0042)  Resp: (!) 37 (02/12/22 0042)  BP: 117/67 (02/12/22 0042)  SpO2: 100 % (02/12/22 0042) Vital Signs (24h Range):  Temp:  [100.7 °F (38.2 °C)-102.9 °F (39.4 °C)] 100.7 °F (38.2 °C)  Pulse:  [131-148] 131  Resp:  [36-60] 37  SpO2:  [95 %-100 %]  100 %  BP: ()/() 117/67     Weight: 65.8 kg (145 lb)  Body mass index is 22.05 kg/m².    Physical Exam  Vitals reviewed.   Constitutional:       Comments: Cachectic, frail, ill appearing   HENT:      Head: Normocephalic and atraumatic.      Mouth/Throat:      Mouth: Mucous membranes are dry.   Eyes:      General: No scleral icterus.     Extraocular Movements: Extraocular movements intact.      Pupils: Pupils are equal, round, and reactive to light.   Cardiovascular:      Rate and Rhythm: Regular rhythm. Tachycardia present.      Heart sounds: Normal heart sounds.   Pulmonary:      Breath sounds: Rhonchi present. No wheezing or rales.      Comments: Poor inspiratory effort  Abdominal:      Tenderness: There is no abdominal tenderness. There is no guarding or rebound.      Comments: PEG tube in place   Musculoskeletal:      Right lower leg: No edema.      Left lower leg: No edema.   Skin:     General: Skin is warm and dry.      Findings: Lesion present.      Comments: Sacral ulcer   Neurological:      Comments: Patient turns away during exam but does not open eyes to command, follow direction, answer questions. Unable to perform neuro exam           CRANIAL NERVES     CN III, IV, VI   Pupils are equal, round, and reactive to light.       Significant Labs:   All pertinent labs within the past 24 hours have been reviewed.  CBC:   Recent Labs   Lab 02/11/22 2130   WBC 32.44*   HGB 7.9*   HCT 24.4*   *     CMP:   Recent Labs   Lab 02/11/22 2130   *   K 4.4      CO2 25   *   BUN 36*   CREATININE 2.19*   CALCIUM 11.6*   PROT 7.4   ALBUMIN 1.9*   BILITOT 1.6*   ALKPHOS 124*   AST 24   ALT 30   ANIONGAP 18*   EGFRNONAA 32*     Lactic Acid:   Recent Labs   Lab 02/11/22 2130 02/11/22  2353   LACTATE 6.9* 5.8*       Significant Imaging: I have reviewed all pertinent imaging results/findings within the past 24 hours.    Assessment/Plan:     * Aspiration pneumonia of left lower lobe  Large  L lobar PNA on CXR. Patient hypoxic requiring supplemental O2. Febrile to 102.9 with leukocytosis of 32 on admission. Lactic acid 6.9 -> 5.8. Concern for aspiration as food particles were suctioned out on arrival.      - Abx: Vancomycin, cefepime, clindamycin, levaquin  - Will repeat lactic acid after IVF resuscitation  - Blood cultures pending   - Supplemental oxygen PRN  - NPO     EDITH (acute kidney injury)  EDITH likely secondary to dehydration. Renal function was normal on discharge from last admission, now with Cr 2.19 and GFR 32. Will give IVF with D5W @125/hr and monitor for improvement with repeat BMP.     Hypernatremia  Mild hypernatremia of 146, likely secondary to dehydration. Will give IVF as well as free water flushes per PEG for rehydration.     Sacral wound  - Wound care consulted      Dysphagia  - Patient with known dysphagia and continued concern for aspiration PNA   - PEG in place  - Nutrition consult for tube feeds  - NPO       VTE Risk Mitigation (From admission, onward)         Ordered     heparin (porcine) injection 5,000 Units  Every 12 hours         02/12/22 0203     IP VTE LOW RISK PATIENT  Once         02/12/22 0203     Place sequential compression device  Until discontinued         02/12/22 0203                   Kia Villatoro MD  Department of Hospital Medicine   [facility] - Emergency Department

## 2022-02-13 LAB
ANION GAP SERPL CALCULATED.3IONS-SCNC: 11 MMOL/L (ref 7–16)
ANISOCYTOSIS BLD QL SMEAR: ABNORMAL
BASOPHILS # BLD AUTO: 0.03 K/UL (ref 0–0.2)
BASOPHILS NFR BLD AUTO: 0.1 % (ref 0–1)
BUN SERPL-MCNC: 32 MG/DL (ref 7–18)
BUN/CREAT SERPL: 22 (ref 6–20)
CALCIUM SERPL-MCNC: 10 MG/DL (ref 8.5–10.1)
CHLORIDE SERPL-SCNC: 107 MMOL/L (ref 98–107)
CO2 SERPL-SCNC: 26 MMOL/L (ref 21–32)
CREAT SERPL-MCNC: 1.47 MG/DL (ref 0.7–1.3)
DIFFERENTIAL METHOD BLD: ABNORMAL
EOSINOPHIL # BLD AUTO: 0.11 K/UL (ref 0–0.5)
EOSINOPHIL NFR BLD AUTO: 0.4 % (ref 1–4)
ERYTHROCYTE [DISTWIDTH] IN BLOOD BY AUTOMATED COUNT: 20.6 % (ref 11.5–14.5)
GLUCOSE SERPL-MCNC: 169 MG/DL (ref 74–106)
HCT VFR BLD AUTO: 19.5 % (ref 40–54)
HGB BLD-MCNC: 6.5 G/DL (ref 13.5–18)
HYPOCHROMIA BLD QL SMEAR: ABNORMAL
IMM GRANULOCYTES # BLD AUTO: 0.69 K/UL (ref 0–0.04)
IMM GRANULOCYTES NFR BLD: 2.8 % (ref 0–0.4)
LYMPHOCYTES # BLD AUTO: 0.98 K/UL (ref 1–4.8)
LYMPHOCYTES NFR BLD AUTO: 4 % (ref 27–41)
LYMPHOCYTES NFR BLD MANUAL: 4 % (ref 27–41)
MCH RBC QN AUTO: 25.1 PG (ref 27–31)
MCHC RBC AUTO-ENTMCNC: 33.3 G/DL (ref 32–36)
MCV RBC AUTO: 75.3 FL (ref 80–96)
METAMYELOCYTES NFR BLD MANUAL: 1 %
MONOCYTES # BLD AUTO: 0.62 K/UL (ref 0–0.8)
MONOCYTES NFR BLD AUTO: 2.5 % (ref 2–6)
MONOCYTES NFR BLD MANUAL: 2 % (ref 2–6)
MPC BLD CALC-MCNC: 10.1 FL (ref 9.4–12.4)
MYELOCYTES NFR BLD MANUAL: 1 %
NEUTROPHILS # BLD AUTO: 22.37 K/UL (ref 1.8–7.7)
NEUTROPHILS NFR BLD AUTO: 90.2 % (ref 53–65)
NEUTS BAND NFR BLD MANUAL: 9 % (ref 1–5)
NEUTS SEG NFR BLD MANUAL: 83 % (ref 50–62)
NRBC # BLD AUTO: 0 X10E3/UL
NRBC, AUTO (.00): 0 %
OVALOCYTES BLD QL SMEAR: ABNORMAL
PLATELET # BLD AUTO: 297 K/UL (ref 150–400)
PLATELET MORPHOLOGY: NORMAL
POLYCHROMASIA BLD QL SMEAR: ABNORMAL
POTASSIUM SERPL-SCNC: 3.4 MMOL/L (ref 3.5–5.1)
RBC # BLD AUTO: 2.59 M/UL (ref 4.6–6.2)
SODIUM SERPL-SCNC: 141 MMOL/L (ref 136–145)
TARGETS BLD QL SMEAR: ABNORMAL
WBC # BLD AUTO: 24.8 K/UL (ref 4.5–11)

## 2022-02-13 PROCEDURE — 85025 COMPLETE CBC W/AUTO DIFF WBC: CPT | Performed by: FAMILY MEDICINE

## 2022-02-13 PROCEDURE — 63600175 PHARM REV CODE 636 W HCPCS: Performed by: HOSPITALIST

## 2022-02-13 PROCEDURE — 99232 PR SUBSEQUENT HOSPITAL CARE,LEVL II: ICD-10-PCS | Mod: ,,, | Performed by: FAMILY MEDICINE

## 2022-02-13 PROCEDURE — 25000003 PHARM REV CODE 250: Performed by: HOSPITALIST

## 2022-02-13 PROCEDURE — 63600175 PHARM REV CODE 636 W HCPCS: Performed by: FAMILY MEDICINE

## 2022-02-13 PROCEDURE — 99232 SBSQ HOSP IP/OBS MODERATE 35: CPT | Mod: ,,, | Performed by: FAMILY MEDICINE

## 2022-02-13 PROCEDURE — 36415 COLL VENOUS BLD VENIPUNCTURE: CPT | Performed by: FAMILY MEDICINE

## 2022-02-13 PROCEDURE — 11000001 HC ACUTE MED/SURG PRIVATE ROOM

## 2022-02-13 PROCEDURE — C9113 INJ PANTOPRAZOLE SODIUM, VIA: HCPCS | Performed by: HOSPITALIST

## 2022-02-13 PROCEDURE — 80048 BASIC METABOLIC PNL TOTAL CA: CPT | Performed by: FAMILY MEDICINE

## 2022-02-13 RX ORDER — LEVOFLOXACIN 5 MG/ML
500 INJECTION, SOLUTION INTRAVENOUS
Status: DISCONTINUED | OUTPATIENT
Start: 2022-02-13 | End: 2022-02-21 | Stop reason: HOSPADM

## 2022-02-13 RX ADMIN — CLINDAMYCIN PHOSPHATE 600 MG: 600 INJECTION, SOLUTION INTRAVENOUS at 05:02

## 2022-02-13 RX ADMIN — LEVOFLOXACIN 500 MG: 5 INJECTION, SOLUTION INTRAVENOUS at 08:02

## 2022-02-13 RX ADMIN — PANTOPRAZOLE SODIUM 40 MG: 40 INJECTION, POWDER, FOR SOLUTION INTRAVENOUS at 09:02

## 2022-02-13 RX ADMIN — HEPARIN SODIUM 5000 UNITS: 5000 INJECTION INTRAVENOUS; SUBCUTANEOUS at 08:02

## 2022-02-13 RX ADMIN — DEXTROSE MONOHYDRATE: 50 INJECTION, SOLUTION INTRAVENOUS at 05:02

## 2022-02-13 RX ADMIN — CLINDAMYCIN PHOSPHATE 600 MG: 600 INJECTION, SOLUTION INTRAVENOUS at 06:02

## 2022-02-13 RX ADMIN — DEXTROSE MONOHYDRATE: 50 INJECTION, SOLUTION INTRAVENOUS at 09:02

## 2022-02-13 RX ADMIN — VANCOMYCIN HYDROCHLORIDE 1250 MG: 1 INJECTION, POWDER, LYOPHILIZED, FOR SOLUTION INTRAVENOUS at 04:02

## 2022-02-13 RX ADMIN — CEFEPIME HYDROCHLORIDE 1 G: 1 INJECTION, POWDER, FOR SOLUTION INTRAMUSCULAR; INTRAVENOUS at 01:02

## 2022-02-13 RX ADMIN — CEFEPIME HYDROCHLORIDE 1 G: 1 INJECTION, POWDER, FOR SOLUTION INTRAMUSCULAR; INTRAVENOUS at 12:02

## 2022-02-13 RX ADMIN — HEPARIN SODIUM 5000 UNITS: 5000 INJECTION INTRAVENOUS; SUBCUTANEOUS at 09:02

## 2022-02-13 RX ADMIN — CLINDAMYCIN PHOSPHATE 600 MG: 600 INJECTION, SOLUTION INTRAVENOUS at 11:02

## 2022-02-13 RX ADMIN — CLINDAMYCIN PHOSPHATE 600 MG: 600 INJECTION, SOLUTION INTRAVENOUS at 12:02

## 2022-02-13 NOTE — SUBJECTIVE & OBJECTIVE
Interval History:  Patient non verbal. No reported problems last night.     Review of Systems   Unable to perform ROS: Patient nonverbal     Objective:     Vital Signs (Most Recent):  Temp: 97.9 °F (36.6 °C) (02/13/22 1630)  Pulse: 107 (02/13/22 1630)  Resp: 18 (02/13/22 1630)  BP: (!) 149/102 (02/13/22 1630)  SpO2: 98 % (02/13/22 1630) Vital Signs (24h Range):  Temp:  [97.7 °F (36.5 °C)-99.4 °F (37.4 °C)] 97.9 °F (36.6 °C)  Pulse:  [106-119] 107  Resp:  [18-24] 18  SpO2:  [98 %-100 %] 98 %  BP: (101-149)/() 149/102     Weight: 65.8 kg (145 lb)  Body mass index is 22.05 kg/m².    Intake/Output Summary (Last 24 hours) at 2/13/2022 1723  Last data filed at 2/13/2022 0554  Gross per 24 hour   Intake 1050 ml   Output --   Net 1050 ml      Physical Exam  Vitals reviewed.   Constitutional:       Comments: Cachectic, frail, ill appearing   HENT:      Head: Normocephalic and atraumatic.      Mouth/Throat:      Mouth: Mucous membranes are dry.   Eyes:      General: No scleral icterus.     Extraocular Movements: Extraocular movements intact.      Pupils: Pupils are equal, round, and reactive to light.   Cardiovascular:      Rate and Rhythm: Regular rhythm. Tachycardia present.      Heart sounds: Normal heart sounds.   Pulmonary:      Breath sounds: Rhonchi present. No wheezing or rales.      Comments: ronchi have improved.   Abdominal:      Tenderness: There is no abdominal tenderness. There is no guarding or rebound.      Comments: PEG tube in place   Musculoskeletal:      Right lower leg: No edema.      Left lower leg: No edema.   Skin:     General: Skin is warm and dry.      Findings: Lesion present.      Comments: Sacral ulcer, unstageable.         Significant Labs:   All pertinent labs within the past 24 hours have been reviewed.  BMP:   Recent Labs   Lab 02/11/22  2130 02/12/22  0543 02/13/22  0952   *   < > 169*   *   < > 141   K 4.4   < > 3.4*      < > 107   CO2 25   < > 26   BUN 36*   < > 32*    CREATININE 2.19*   < > 1.47*   CALCIUM 11.6*   < > 10.0   MG 2.3  --   --     < > = values in this interval not displayed.     CBC:   Recent Labs   Lab 02/11/22  2130 02/12/22  0543 02/13/22  1312   WBC 32.44* 27.87* 24.80*   HGB 7.9* 6.8* 6.5*   HCT 24.4* 20.4* 19.5*   * 310 297       Significant Imaging: I have reviewed all pertinent imaging results/findings within the past 24 hours.

## 2022-02-13 NOTE — PLAN OF CARE
Problem: Adult Inpatient Plan of Care  Goal: Plan of Care Review  Outcome: Ongoing, Progressing  Goal: Patient-Specific Goal (Individualized)  Outcome: Ongoing, Progressing  Goal: Absence of Hospital-Acquired Illness or Injury  Outcome: Ongoing, Progressing  Intervention: Identify and Manage Fall Risk  Flowsheets (Taken 2/13/2022 0142)  Safety Promotion/Fall Prevention:   assistive device/personal item within reach   bed alarm set   room near unit station   side rails raised x 2  Intervention: Prevent Skin Injury  Flowsheets (Taken 2/13/2022 0142)  Body Position: turned  Skin Protection:   adhesive use limited   incontinence pads utilized  Intervention: Prevent and Manage VTE (Venous Thromboembolism) Risk  Flowsheets (Taken 2/13/2022 0142)  Activity Management: Arm raise - L1  VTE Prevention/Management: bleeding precations maintained  Goal: Optimal Comfort and Wellbeing  Outcome: Ongoing, Progressing  Goal: Readiness for Transition of Care  Outcome: Ongoing, Progressing     Problem: Fall Injury Risk  Goal: Absence of Fall and Fall-Related Injury  Outcome: Ongoing, Progressing     Problem: Skin Injury Risk Increased  Goal: Skin Health and Integrity  Outcome: Ongoing, Progressing     Problem: Fluid and Electrolyte Imbalance (Acute Kidney Injury/Impairment)  Goal: Fluid and Electrolyte Balance  Outcome: Ongoing, Progressing     Problem: Oral Intake Inadequate (Acute Kidney Injury/Impairment)  Goal: Optimal Nutrition Intake  Outcome: Ongoing, Progressing     Problem: Renal Function Impairment (Acute Kidney Injury/Impairment)  Goal: Effective Renal Function  Outcome: Ongoing, Progressing     Problem: Fluid Imbalance (Pneumonia)  Goal: Fluid Balance  Outcome: Ongoing, Progressing     Problem: Infection (Pneumonia)  Goal: Resolution of Infection Signs and Symptoms  Outcome: Ongoing, Progressing     Problem: Respiratory Compromise (Pneumonia)  Goal: Effective Oxygenation and Ventilation  Outcome: Ongoing, Progressing      Problem: Impaired Wound Healing  Goal: Optimal Wound Healing  Outcome: Ongoing, Progressing

## 2022-02-13 NOTE — PROGRESS NOTES
[address] Medicine  Progress Note    Patient Name: Owen Solis  MRN: [id number]  Patient Class: IP- Inpatient   Admission Date: 2/11/2022  Length of Stay: 2 days  Attending Physician: Yfn Richardson Jr., MD  Primary Care Provider: Angelina Jaramillo II, MD        Subjective:     Principal Problem:Aspiration pneumonia of left lower lobe        HPI:    Mr. Solis is a 62yo M who presents from [facility] with shortness of breath and hypoxia.     The patient was recently discharged from [facility] on 2/7 after being treated for UTI, HCAP believed to be secondary to aspiration, and dehydration. He presents similarly today. Patient has had failure to thrive post-COVID and currently has a PEG for tube feedings. During last admission, patient was suctioned and food remnants were found. Upon suctioning on arrival today they were found again. Patient has known dysphagia only receiving PEG feeds and was unable to pass a swallow evaluation during the last hospitalization. Patient turns his head from me during exam but does not otherwise respond. Patient has a PMH of Alzheimer's dementia, COVID-19, HTN.     On arrival, patient was febrile to 102.9 and tachycardic to 140. EKG with sinus tachycardia. Leukocytosis of 32, Na 146, BUN/Cr 36/2.19 with GFR 32. CXR with a large L sided PNA. Lactic acid initially 6.9 and repeat 5.8. Patient has received 2L NS bolus and zosyn in the ED. Will admit patient to the hospitalist service for further IV antibiotics and IVF resuscitation.       Overview/Hospital Course:  No notes on file    Interval History:  Patient non verbal. No reported problems last night.     Review of Systems   Unable to perform ROS: Patient nonverbal     Objective:     Vital Signs (Most Recent):  Temp: 97.9 °F (36.6 °C) (02/13/22 1630)  Pulse: 107 (02/13/22 1630)  Resp: 18 (02/13/22 1630)  BP: (!) 149/102 (02/13/22 1630)  SpO2: 98 % (02/13/22 1630) Vital Signs (24h Range):  Temp:  [97.7  °F (36.5 °C)-99.4 °F (37.4 °C)] 97.9 °F (36.6 °C)  Pulse:  [106-119] 107  Resp:  [18-24] 18  SpO2:  [98 %-100 %] 98 %  BP: (101-149)/() 149/102     Weight: 65.8 kg (145 lb)  Body mass index is 22.05 kg/m².    Intake/Output Summary (Last 24 hours) at 2/13/2022 1723  Last data filed at 2/13/2022 0554  Gross per 24 hour   Intake 1050 ml   Output --   Net 1050 ml      Physical Exam  Vitals reviewed.   Constitutional:       Comments: Cachectic, frail, ill appearing   HENT:      Head: Normocephalic and atraumatic.      Mouth/Throat:      Mouth: Mucous membranes are dry.   Eyes:      General: No scleral icterus.     Extraocular Movements: Extraocular movements intact.      Pupils: Pupils are equal, round, and reactive to light.   Cardiovascular:      Rate and Rhythm: Regular rhythm. Tachycardia present.      Heart sounds: Normal heart sounds.   Pulmonary:      Breath sounds: Rhonchi present. No wheezing or rales.      Comments: ronchi have improved.   Abdominal:      Tenderness: There is no abdominal tenderness. There is no guarding or rebound.      Comments: PEG tube in place   Musculoskeletal:      Right lower leg: No edema.      Left lower leg: No edema.   Skin:     General: Skin is warm and dry.      Findings: Lesion present.      Comments: Sacral ulcer, unstageable.         Significant Labs:   All pertinent labs within the past 24 hours have been reviewed.  BMP:   Recent Labs   Lab 02/11/22 2130 02/12/22 0543 02/13/22  0952   *   < > 169*   *   < > 141   K 4.4   < > 3.4*      < > 107   CO2 25   < > 26   BUN 36*   < > 32*   CREATININE 2.19*   < > 1.47*   CALCIUM 11.6*   < > 10.0   MG 2.3  --   --     < > = values in this interval not displayed.     CBC:   Recent Labs   Lab 02/11/22 2130 02/12/22  0543 02/13/22  1312   WBC 32.44* 27.87* 24.80*   HGB 7.9* 6.8* 6.5*   HCT 24.4* 20.4* 19.5*   * 310 297       Significant Imaging: I have reviewed all pertinent imaging results/findings  within the past 24 hours.      Assessment/Plan:      * Aspiration pneumonia of left lower lobe  Large L lobar PNA on CXR. Patient hypoxic requiring supplemental O2. Febrile to 102.9 with leukocytosis of 32 on admission. Lactic acid 6.9 -> 5.8. Concern for aspiration as food particles were suctioned out on arrival.      - Abx: Vancomycin, cefepime, clindamycin, levaquin  - Blood cultures pending   - Supplemental oxygen PRN  - NPO   - tube feeding resumed. Appears less ill today.     EDITH (acute kidney injury)  EDITH likely secondary to dehydration. Improved with fluids.     Sacral wound  - Wound care consulted for this unstageable wound.       Hypernatremia  Mild hypernatremia of 146, now resolved.    Dysphagia  - Patient with known dysphagia and continued concern for aspiration PNA   - PEG in place  - Nutrition consult for tube feeds  - NPO       VTE Risk Mitigation (From admission, onward)         Ordered     heparin (porcine) injection 5,000 Units  Every 12 hours         02/12/22 0203     IP VTE LOW RISK PATIENT  Once         02/12/22 0203     Place sequential compression device  Until discontinued         02/12/22 0203                Discharge Planning   ESTEFANY:      Code Status: Full Code   Is the patient medically ready for discharge?:     Reason for patient still in hospital (select all that apply): Treatment                     Yfn Richardson Jr, MD  Department of Hospital Medicine   [address]

## 2022-02-13 NOTE — ASSESSMENT & PLAN NOTE
Large L lobar PNA on CXR. Patient hypoxic requiring supplemental O2. Febrile to 102.9 with leukocytosis of 32 on admission. Lactic acid 6.9 -> 5.8. Concern for aspiration as food particles were suctioned out on arrival.      - Abx: Vancomycin, cefepime, clindamycin, levaquin  - Blood cultures pending   - Supplemental oxygen PRN  - NPO   - tube feeding resumed. Appears less ill today.    Passed

## 2022-02-14 LAB — GLUCOSE SERPL-MCNC: 148 MG/DL (ref 70–105)

## 2022-02-14 PROCEDURE — 25000003 PHARM REV CODE 250: Performed by: HOSPITALIST

## 2022-02-14 PROCEDURE — 99232 PR SUBSEQUENT HOSPITAL CARE,LEVL II: ICD-10-PCS | Mod: ,,, | Performed by: FAMILY MEDICINE

## 2022-02-14 PROCEDURE — 63600175 PHARM REV CODE 636 W HCPCS: Performed by: HOSPITALIST

## 2022-02-14 PROCEDURE — 11000001 HC ACUTE MED/SURG PRIVATE ROOM

## 2022-02-14 PROCEDURE — 63600175 PHARM REV CODE 636 W HCPCS: Performed by: FAMILY MEDICINE

## 2022-02-14 PROCEDURE — C9113 INJ PANTOPRAZOLE SODIUM, VIA: HCPCS | Performed by: HOSPITALIST

## 2022-02-14 PROCEDURE — 99232 SBSQ HOSP IP/OBS MODERATE 35: CPT | Mod: ,,, | Performed by: FAMILY MEDICINE

## 2022-02-14 PROCEDURE — 27000221 HC OXYGEN, UP TO 24 HOURS

## 2022-02-14 PROCEDURE — 82962 GLUCOSE BLOOD TEST: CPT

## 2022-02-14 PROCEDURE — 94761 N-INVAS EAR/PLS OXIMETRY MLT: CPT

## 2022-02-14 RX ADMIN — CLINDAMYCIN PHOSPHATE 600 MG: 600 INJECTION, SOLUTION INTRAVENOUS at 05:02

## 2022-02-14 RX ADMIN — CLINDAMYCIN PHOSPHATE 600 MG: 600 INJECTION, SOLUTION INTRAVENOUS at 11:02

## 2022-02-14 RX ADMIN — PANTOPRAZOLE SODIUM 40 MG: 40 INJECTION, POWDER, FOR SOLUTION INTRAVENOUS at 08:02

## 2022-02-14 RX ADMIN — HEPARIN SODIUM 5000 UNITS: 5000 INJECTION INTRAVENOUS; SUBCUTANEOUS at 08:02

## 2022-02-14 RX ADMIN — CLINDAMYCIN PHOSPHATE 600 MG: 600 INJECTION, SOLUTION INTRAVENOUS at 06:02

## 2022-02-14 RX ADMIN — DEXTROSE MONOHYDRATE: 50 INJECTION, SOLUTION INTRAVENOUS at 04:02

## 2022-02-14 RX ADMIN — CEFEPIME HYDROCHLORIDE 1 G: 1 INJECTION, POWDER, FOR SOLUTION INTRAMUSCULAR; INTRAVENOUS at 12:02

## 2022-02-14 RX ADMIN — LEVOFLOXACIN 500 MG: 5 INJECTION, SOLUTION INTRAVENOUS at 08:02

## 2022-02-14 RX ADMIN — VANCOMYCIN HYDROCHLORIDE 1250 MG: 1 INJECTION, POWDER, LYOPHILIZED, FOR SOLUTION INTRAVENOUS at 02:02

## 2022-02-14 NOTE — PLAN OF CARE
[facility] [place]  [place]charge Assessment       Primary Care Provider: Angelina Jaramillo II, MD    Admission Diagnosis: Shortness of breath [R06.02]  Tachycardia [R00.0]  Dehydration with hypernatremia [E86.0, E87.0]  Oral phase dysphagia [R13.11]  EDITH (acute kidney injury) [N17.9]  Wound of sacral region, subsequent encounter [S31.000D]  Sepsis due to pneumonia [J18.9, A41.9]  Aspiration pneumonia of left lower lobe, unspecified aspiration pneumonia type [J69.0]  Aspiration pneumonia of left lower lobe due to regurgitated food [J69.0]    Admission Date: 2/11/2022  Expected Discharge Date:     Discharge Barriers Identified: None    Payor: [facility] MCARE / Plan: [facility] DUAL COMPLETE HMO SNP / Product Type: Medicare Advantage /     Extended Emergency Contact Information  Primary Emergency Contact: Lesli Solis  [place] Phone: [phone]  Mobile Phone: [phone]  Relation: Sister  Preferred language: English   needed? No  Secondary Emergency Contact: Krystyna Ledesma  Mobile Phone: [phone]  Relation:   Preferred language: English   needed? No    Discharge Plan A: Return to nursing home  Discharge Plan B: Return to Nursing Home    No Pharmacies Listed    Initial Assessment (most recent)     Adult Discharge Assessment - 02/14/22 0946        Discharge Assessment    Assessment Type Discharge Planning Assessment     Source of Information family     If unable to respond/provide information was family/caregiver contacted? Yes     Contact Name/Number sister morfin     Lives With facility resident     Facility Arrived From: [facility]     Do you expect to return to your current living situation? Yes     Equipment Currently Used at Home wheelchair     Do you currently have service(s) that help you manage your care at home? No     Discharge Plan A Return to nursing home     Discharge Plan B Return to Nursing Home     DME Needed Upon Discharge  none      Discharge Plan discussed with: Sibling     Name(s) and Number(s) lesli garcia     Discharge Barriers Identified None        Relationship/Environment    Name(s) of Who Lives With Patient facility resident                    SS spoke with pt's sister Lesli Garcia, states pt is a resident at [facility]. Plan at d/c is to return to [facility], choice obtained. Pt uses wc at facility. IM obtained. SS following for d/c needs. Packet started.

## 2022-02-14 NOTE — PROGRESS NOTES
[address] Medicine  Progress Note    Patient Name: Owen Solis  MRN: [id number]  Patient Class: IP- Inpatient   Admission Date: 2/11/2022  Length of Stay: 3 days  Attending Physician: fYn Richardson Jr., MD  Primary Care Provider: Angelina Jaramillo II, MD        Subjective:     Principal Problem:Aspiration pneumonia of left lower lobe        HPI:    Mr. Solis is a 60yo M who presents from [facility] with shortness of breath and hypoxia.     The patient was recently discharged from [facility] on 2/7 after being treated for UTI, HCAP believed to be secondary to aspiration, and dehydration. He presents similarly today. Patient has had failure to thrive post-COVID and currently has a PEG for tube feedings. During last admission, patient was suctioned and food remnants were found. Upon suctioning on arrival today they were found again. Patient has known dysphagia only receiving PEG feeds and was unable to pass a swallow evaluation during the last hospitalization. Patient turns his head from me during exam but does not otherwise respond. Patient has a PMH of Alzheimer's dementia, COVID-19, HTN.     On arrival, patient was febrile to 102.9 and tachycardic to 140. EKG with sinus tachycardia. Leukocytosis of 32, Na 146, BUN/Cr 36/2.19 with GFR 32. CXR with a large L sided PNA. Lactic acid initially 6.9 and repeat 5.8. Patient has received 2L NS bolus and zosyn in the ED. Will admit patient to the hospitalist service for further IV antibiotics and IVF resuscitation.       Overview/Hospital Course:  No notes on file    Interval History: Non verbal, no reported problems.     Review of Systems   Unable to perform ROS: Patient nonverbal     Objective:     Vital Signs (Most Recent):  Temp: 98.8 °F (37.1 °C) (02/14/22 1045)  Pulse: 109 (02/14/22 0720)  Resp: 20 (02/14/22 1045)  BP: 106/72 (02/14/22 1045)  SpO2: 100 % (02/14/22 1045) Vital Signs (24h Range):  Temp:  [97.9 °F (36.6 °C)-99.3 °F  (37.4 °C)] 98.8 °F (37.1 °C)  Pulse:  [103-113] 109  Resp:  [18-22] 20  SpO2:  [97 %-100 %] 100 %  BP: (106-149)/() 106/72     Weight: 65.7 kg (144 lb 13.5 oz)  Body mass index is 22.02 kg/m².  No intake or output data in the 24 hours ending 02/14/22 1344   Physical Exam  Vitals reviewed.   Constitutional:       Comments: Cachectic, frail, ill appearing   HENT:      Head: Normocephalic and atraumatic.      Mouth/Throat:      Mouth: Mucous membranes are dry.   Eyes:      General: No scleral icterus.     Extraocular Movements: Extraocular movements intact.      Pupils: Pupils are equal, round, and reactive to light.   Cardiovascular:      Rate and Rhythm: Regular rhythm. Tachycardia present.      Heart sounds: Normal heart sounds.   Pulmonary:      Breath sounds: Rhonchi present. No wheezing or rales.      Comments: ronchi have improved.   Abdominal:      Tenderness: There is no abdominal tenderness. There is no guarding or rebound.      Comments: PEG tube in place   Musculoskeletal:      Right lower leg: No edema.      Left lower leg: No edema.   Skin:     General: Skin is warm and dry.      Findings: Lesion present.      Comments: Sacral ulcer, unstageable.         Significant Labs:   All pertinent labs within the past 24 hours have been reviewed.  BMP:   Recent Labs   Lab 02/13/22  0952   *      K 3.4*      CO2 26   BUN 32*   CREATININE 1.47*   CALCIUM 10.0     CBC:   Recent Labs   Lab 02/13/22  1312   WBC 24.80*   HGB 6.5*   HCT 19.5*          Significant Imaging: I have reviewed all pertinent imaging results/findings within the past 24 hours.      Assessment/Plan:      * Aspiration pneumonia of left lower lobe  Large L lobar PNA on CXR. Patient hypoxic requiring supplemental O2. Febrile to 102.9 with leukocytosis of 32 on admission. Lactic acid 6.9 -> 5.8. Concern for aspiration as food particles were suctioned out on arrival.      - Abx: Vancomycin, cefepime, clindamycin,  levaquin  - Blood cultures pending   - Supplemental oxygen PRN  - NPO   - tube feeding resumed.   - Improving.  Recheck lab tomorrow.     EDITH (acute kidney injury)  EDITH likely secondary to dehydration. Improved with fluids. Lab ordered for tomorrow.     Sacral wound  - Wound care consulted for this unstageable wound.       Hypernatremia  Mild hypernatremia of 146, now resolved.    Dysphagia  - Patient with known dysphagia and continued concern for aspiration PNA   - PEG in place  - Nutrition consult for tube feeds  - NPO       VTE Risk Mitigation (From admission, onward)         Ordered     heparin (porcine) injection 5,000 Units  Every 12 hours         02/12/22 0203     IP VTE LOW RISK PATIENT  Once         02/12/22 0203     Place sequential compression device  Until discontinued         02/12/22 0203                Discharge Planning   ESTEFANY:      Code Status: Full Code   Is the patient medically ready for discharge?:     Reason for patient still in hospital (select all that apply): Treatment  Discharge Plan A: Return to nursing home                  Yfn Richardson Jr, MD  Department of Hospital Medicine   [address]

## 2022-02-14 NOTE — SUBJECTIVE & OBJECTIVE
Interval History: Non verbal, no reported problems.     Review of Systems   Unable to perform ROS: Patient nonverbal     Objective:     Vital Signs (Most Recent):  Temp: 98.8 °F (37.1 °C) (02/14/22 1045)  Pulse: 109 (02/14/22 0720)  Resp: 20 (02/14/22 1045)  BP: 106/72 (02/14/22 1045)  SpO2: 100 % (02/14/22 1045) Vital Signs (24h Range):  Temp:  [97.9 °F (36.6 °C)-99.3 °F (37.4 °C)] 98.8 °F (37.1 °C)  Pulse:  [103-113] 109  Resp:  [18-22] 20  SpO2:  [97 %-100 %] 100 %  BP: (106-149)/() 106/72     Weight: 65.7 kg (144 lb 13.5 oz)  Body mass index is 22.02 kg/m².  No intake or output data in the 24 hours ending 02/14/22 1344   Physical Exam  Vitals reviewed.   Constitutional:       Comments: Cachectic, frail, ill appearing   HENT:      Head: Normocephalic and atraumatic.      Mouth/Throat:      Mouth: Mucous membranes are dry.   Eyes:      General: No scleral icterus.     Extraocular Movements: Extraocular movements intact.      Pupils: Pupils are equal, round, and reactive to light.   Cardiovascular:      Rate and Rhythm: Regular rhythm. Tachycardia present.      Heart sounds: Normal heart sounds.   Pulmonary:      Breath sounds: Rhonchi present. No wheezing or rales.      Comments: ronchi have improved.   Abdominal:      Tenderness: There is no abdominal tenderness. There is no guarding or rebound.      Comments: PEG tube in place   Musculoskeletal:      Right lower leg: No edema.      Left lower leg: No edema.   Skin:     General: Skin is warm and dry.      Findings: Lesion present.      Comments: Sacral ulcer, unstageable.         Significant Labs:   All pertinent labs within the past 24 hours have been reviewed.  BMP:   Recent Labs   Lab 02/13/22  0952   *      K 3.4*      CO2 26   BUN 32*   CREATININE 1.47*   CALCIUM 10.0     CBC:   Recent Labs   Lab 02/13/22  1312   WBC 24.80*   HGB 6.5*   HCT 19.5*          Significant Imaging: I have reviewed all pertinent imaging  results/findings within the past 24 hours.

## 2022-02-14 NOTE — PLAN OF CARE
Problem: Adult Inpatient Plan of Care  Goal: Plan of Care Review  Outcome: Ongoing, Progressing  Goal: Patient-Specific Goal (Individualized)  Outcome: Ongoing, Progressing  Goal: Absence of Hospital-Acquired Illness or Injury  Outcome: Ongoing, Progressing  Goal: Optimal Comfort and Wellbeing  Outcome: Ongoing, Progressing  Goal: Readiness for Transition of Care  Outcome: Ongoing, Progressing     Problem: Skin Injury Risk Increased  Goal: Skin Health and Integrity  Outcome: Ongoing, Progressing     Problem: Oral Intake Inadequate (Acute Kidney Injury/Impairment)  Goal: Optimal Nutrition Intake  Outcome: Ongoing, Progressing     Problem: Fluid Imbalance (Pneumonia)  Goal: Fluid Balance  Outcome: Ongoing, Progressing     Problem: Respiratory Compromise (Pneumonia)  Goal: Effective Oxygenation and Ventilation  Outcome: Ongoing, Progressing

## 2022-02-14 NOTE — ASSESSMENT & PLAN NOTE
Large L lobar PNA on CXR. Patient hypoxic requiring supplemental O2. Febrile to 102.9 with leukocytosis of 32 on admission. Lactic acid 6.9 -> 5.8. Concern for aspiration as food particles were suctioned out on arrival.      - Abx: Vancomycin, cefepime, clindamycin, levaquin  - Blood cultures pending   - Supplemental oxygen PRN  - NPO   - tube feeding resumed.   - Improving.  Recheck lab tomorrow.

## 2022-02-14 NOTE — PROGRESS NOTES
[address]  Wound Care    Patient Name:  Owen Solis   MRN:  [id number]  Date: 2/14/2022  Diagnosis: Aspiration pneumonia of left lower lobe    History:     Past Medical History:   Diagnosis Date    Alzheimer disease     Alzheimer's dementia     Arthritis     Hypertension     Metabolic encephalopathy        Social History     Socioeconomic History    Marital status: Single   Tobacco Use    Smoking status: Never Smoker    Smokeless tobacco: Never Used   Substance and Sexual Activity    Alcohol use: Not Currently    Drug use: Never    Sexual activity: Not Currently       Precautions:     Allergies as of 02/11/2022 - Reviewed 02/11/2022   Allergen Reaction Noted    Amitriptyline  08/26/2021       WOC Assessment Details/Treatment        02/14/22 1646   WOCN Assessment   WOCN Total Time (mins) 60   Visit Date 02/14/22   Visit Time 1646   Consult Type New   WOCN Speciality Wound   Wound pressure   Number of Wounds 3   Intervention assessed;applied;chart review;team conference;orders   Teaching   (unable to educate pt)   Skin Interventions   Device Skin Pressure Protection absorbent pad utilized/changed;positioning supports utilized;pressure points protected;skin-to-device areas padded   Pressure Reduction Devices foam padding utilized;heel offloading device utilized;positioning supports utilized;pressure-redistributing mattress utilized   Pressure Reduction Techniques heels elevated off bed;positioned off wounds;pressure points protected;weight shift assistance provided   Skin Protection adhesive use limited;incontinence pads utilized;pectin skin barriers applied;silicone foam dressing in place;skin sealant/moisture barrier applied;tubing/devices free from skin contact   Positioning   Body Position turned;right;30 degrees;side-lying   Head of Bed (HOB) Positioning HOB at 30-45 degrees   Positioning/Transfer Devices pillows;in use   Pressure Injury Prevention    Check Moisture  Management Pad Done   Sacral Foam Dressing Check   Elevate Heels Technique Pillow   Heel protection technique Foam Dressing   Check Medical Devices Done        Altered Skin Integrity 02/02/22 1943 Left lateral Thigh #2 Partial thickness tissue loss. Shallow open ulcer with a red or pink wound bed, without slough. Intact or Open/Ruptured Serum-filled blister.   Date First Assessed/Time First Assessed: 02/02/22 1943   Altered Skin Integrity Present on Admission: yes  Side: Left  Orientation: lateral  Location: Thigh  Wound Number: #2  Description of Altered Skin Integrity: Partial thickness tissue loss. Shall...   Wound Image    Description of Altered Skin Integrity Partial thickness tissue loss. Shallow open ulcer with a red or pink wound bed, without slough. Intact or Open/Ruptured Serum-filled blister.   Dressing Appearance Dry;Intact;Clean   Drainage Amount Scant   Drainage Characteristics/Odor Serous   Appearance Pink;Epithelialization;Dry   Tissue loss description Partial thickness   Periwound Area Intact;Dry   Wound Edges Defined;Open   Wound Length (cm) 2 cm   Wound Width (cm) 2.1 cm   Wound Depth (cm) 0.05 cm   Wound Volume (cm^3) 0.21 cm^3   Wound Surface Area (cm^2) 4.2 cm^2   Dressing Reinforced;Foam   Dressing Change Due 02/19/22        Altered Skin Integrity 02/02/22 1944 Left medial;lateral Sacral spine #3 Full thickness tissue loss. Base is covered by slough and/or eschar in the wound bed   Date First Assessed/Time First Assessed: 02/02/22 1944   Altered Skin Integrity Present on Admission: yes  Side: Left  Orientation: medial;lateral  Location: Sacral spine  Wound Number: #3  Description of Altered Skin Integrity: (c) Full thickness tis...   Wound Image    Description of Altered Skin Integrity Full thickness tissue loss. Base is covered by slough and/or eschar in the wound bed   Dressing Appearance Moist drainage   Drainage Amount Small   Drainage Characteristics/Odor  Serosanguineous;Malodorous;Yellow;Green;Creamy   Appearance Pink;Red;Gray;Tan;Green;Yellow;Necrotic;Slough;Eschar;Wet   Tissue loss description Full thickness   Black (%), Wound Tissue Color 70 %   Red (%), Wound Tissue Color 20 %   Yellow (%), Wound Tissue Color 10 %   Periwound Area Denuded;Moist;Pink   Wound Edges Defined;Open   Wound Length (cm) 8 cm   Wound Width (cm) 8 cm   Wound Depth (cm) 0.2 cm   Wound Volume (cm^3) 12.8 cm^3   Wound Surface Area (cm^2) 64 cm^2   Dressing Reinforced;Foam   Dressing Change Due 02/14/22        Altered Skin Integrity 02/02/22 1943 Left lateral Greater trochanter #1 Intact skin with non-blanchable redness of localized area   Date First Assessed/Time First Assessed: 02/02/22 1943   Altered Skin Integrity Present on Admission: yes  Side: Left  Orientation: lateral  Location: Greater trochanter  Wound Number: #1  Description of Altered Skin Integrity: Intact skin with non-bl...   Wound Image    Description of Altered Skin Integrity Purple or maroon localized area of discolored intact skin or non-intact skin or a blood-filled blister.   Dressing Appearance Open to air   Drainage Amount None   Appearance Purple;Intact;Dry   Tissue loss description Not applicable   Periwound Area Intact;Dry   Wound Edges Defined   Dressing Applied;Foam   Dressing Change Due 02/21/22        Altered Skin Integrity 02/12/22 2051 Left posterior Elbow #4 Purple or maroon localized area of discolored intact skin or non-intact skin or a blood-filled blister.   Date First Assessed/Time First Assessed: 02/12/22 (c) 2051   Altered Skin Integrity Present on Admission: yes  Side: Left  Orientation: posterior  Location: Elbow  Wound Number: #4  Description of Altered Skin Integrity: Purple or maroon localized are...   Wound Image    Description of Altered Skin Integrity Purple or maroon localized area of discolored intact skin or non-intact skin or a blood-filled blister.   Dressing Appearance Dry;Intact;Clean    Drainage Amount None   Appearance Purple;Intact;Dry   Tissue loss description Not applicable   Periwound Area Dry   Wound Edges Irregular   Dressing Reinforced;Foam   Dressing Change Due 02/19/22     PI's noted: ALL POA  1. unstageable to sacrum  2. DTPI to left hip  3. DTPI to left elbow  4. St2 to left thigh    Patient known to me from previous admission. Discussed POC of sacral wound with Dr. Richardson and surgery consulted for possible debridement.     Specialty mattress - alternating low air loss, also ordered from [employer] and RN to place patient on XL TAP system and utilize wedges for q2h when mattress arrives.    Protective foams placed to bilateral heels. Turns q2h. Dietary for protein recommendations.    Wound care recommendations:  Sacrum: BID  1. Cleanse with wound cleanser.   2. Periwound: pat dry. Spray no sting barrier spray and allow to air dry.  3. Apply Aquacel Advantage Ag hydrofiber.   4. Cover with ABD pad.  5. Secure with paper tape and label with date, time and initials.    Left Hip, Left Elbow, and Left thigh: Q5-7D   1. Remove old foams and perform daily bath.  2. Dry areas well.  3. Apply new bordered foam dressings.  Label with date, time and initials.    Recommendations made to primary team for above. Orders placed.     02/14/2022

## 2022-02-14 NOTE — PROGRESS NOTES
[facility], [place] - [address] [place] [address]  Adult Nutrition  Consult Note         Reason for Assessment  Reason For Assessment: consult  Nutrition Risk Screen: no indicators present  Malnutrition  Is Patient Malnourished: No  Nutrition Diagnosis  Increased protein Needs   related to Decreased/ impaired skin integrity as evidenced by wounds    Nutrition Diagnosis Status: Improving      Nutrition Risk  Level of Risk/Frequency of Follow-up: high   Chewing or Swallowing Difficulty?: Swallowing difficulty  Estimated/Assessed Needs  RMR ([place]-St. Jeor Equation): 1426.5 Activity Factor: 1 Injury Factor: 1.2     Temp: 99.1 °F (37.3 °C)Axillary  Weight Used For Calorie Calculations: 65.8 kg (145 lb 1 oz)   Energy Need Method: Kcal/kg Energy Calorie Requirements (kcal): 6349-7286  Weight Used For Protein Calculations: 65.7 kg (144 lb 13.5 oz)  Protein Requirements: 79-99  Estimated Fluid Requirement Method: RDA Method Fluid Requirements (mL): 1645  RDA Method (mL): 1645     Nutrition Prescription / Recommendations  Recommendation/Intervention: PEG tube feeding: Pivot 1.5 @ 50ml / hr; H20 flush of 150ml q4hr  Goals: pt will meet estimated nutritioanl needs; wound healing, wt maintenance, TF tolerance  Nutrition Goal Status: progressing towards goal  Communication of RD Recs: reviewed with physician  Current Diet Order: NPO/ PEG tube feeding  Nutrition Order Comments: PEG tube feeding: Pivot 1.5 @ 50ml / hr; H20 flush of 150ml q4hr  Recommended Diet: Enteral Nutrition PEG tube feeding: Pivot 1.5 @ 50ml / hr; H20 flush of 150ml q4hr  Recommended Oral Supplement: No Oral Supplements  Is Nutrition Support Recommended: No  Is Education Recommended: No  Monitor and Evaluation  % current Intake: Enteral Nutrition progressing to goal  % intake to meet estimated needs: Enteral Nutrition   Food and Nutrient Intake: enteral nutrition intake  Food and Nutrient Adminstration: enteral and parenteral nutrition  "administration  Anthropometric Measurements: height/length,weight,weight change,body mass index  Biochemical Data, Medical Tests and Procedures: electrolyte and renal panel,glucose/endocrine profile  Enteral Calories (kcal): 1800  Enteral Protein (gm): 112  Enteral (Free Water) Fluid (mL): 900  Free Water Flush Fluid (mL): 900  Parenteral Calories (kcal): 1800  Energy Calories Required: meeting needs  Protein Required: meeting needs  Fluid Required: meeting needs  Tolerance: tolerating  Current Medical Diagnosis and Past Medical History  Diagnosis: psychological disorder  Past Medical History:   Diagnosis Date    Alzheimer disease     Alzheimer's dementia     Arthritis     Hypertension     Metabolic encephalopathy      Nutrition/Diet History  Food Allergies: NKFA  Lab/Procedures/Meds  Recent Labs   Lab 02/11/22  2130 02/12/22  0543 02/13/22  0952   *   < > 141   K 4.4   < > 3.4*   BUN 36*   < > 32*   CREATININE 2.19*   < > 1.47*   *   < > 169*   CALCIUM 11.6*   < > 10.0   ALBUMIN 1.9*  --   --       < > 107   ALT 30  --   --    AST 24  --   --     < > = values in this interval not displayed.     Last A1c: No results found for: HGBA1C  Lab Results   Component Value Date    RBC 2.59 (L) 02/13/2022    HGB 6.5 (L) 02/13/2022    HCT 19.5 (L) 02/13/2022    MCV 75.3 (L) 02/13/2022    MCH 25.1 (L) 02/13/2022    MCHC 33.3 02/13/2022     Pertinent Labs Reviewed: reviewed  Pertinent Labs Comments: BUN 39, Cr 1.96, Glu 120, Alb 1.9  Pertinent Medications Reviewed: reviewed  Anthropometrics  Temp: 99.1 °F (37.3 °C)  Height Method: Estimated  Height: 5' 8" (172.7 cm)  Height (inches): 68 in  Weight Method: Bed Scale  Weight: 65.7 kg (144 lb 13.5 oz)  Weight (lb): 144.84 lb  Ideal Body Weight (IBW), Male: 154 lb  % Ideal Body Weight, Male (lb): 94.05 %  BMI (Calculated): 22     Nutrition by Nursing              Last Bowel Movement: 02/12/22       Gastrostomy/Enterostomy 09/20/21 1214 Percutaneous endoscopic " gastrostomy (PEG) LUQ feeding-Feeding Type: continuous       Gastrostomy/Enterostomy 09/20/21 1214 Percutaneous endoscopic gastrostomy (PEG) LUQ feeding-Current Rate (mL/hr): 50 mL/hr       Gastrostomy/Enterostomy 09/20/21 1214 Percutaneous endoscopic gastrostomy (PEG) LUQ feeding-Goal Rate (mL/hr): 50 mL/hr       Gastrostomy/Enterostomy 09/20/21 1214 Percutaneous endoscopic gastrostomy (PEG) LUQ feeding-Formula Name: pivot  Nutrition Follow-Up  RD Follow-up?: Yes  Assessment and Plan  No new Assessment & Plan notes have been filed under this hospital service since the last note was generated.  Service: Nutrition

## 2022-02-14 NOTE — NURSING
16 Fr Coude catheter inserted per Halely Curry RN. Pt tolerated. 150 cc of clear yellow urine returned. Linen changed. Pt left comfortable in bed. Safety ms ongoing.

## 2022-02-14 NOTE — PROGRESS NOTES
Pharmacist Renal Dose Adjustment Note    Owen Solis is a 61 y.o. male being treated with the medication Levaquin    Patient Data:    Vital Signs (Most Recent):  Temp: 97.9 °F (36.6 °C) (02/13/22 1630)  Pulse: 107 (02/13/22 1630)  Resp: 18 (02/13/22 1630)  BP: (!) 149/102 (02/13/22 1630)  SpO2: 98 % (02/13/22 1630)   Vital Signs (72h Range):  Temp:  [97.7 °F (36.5 °C)-102.9 °F (39.4 °C)]   Pulse:  [106-148]   Resp:  [18-60]   BP: ()/()   SpO2:  [88 %-100 %]      Recent Labs   Lab 02/11/22  2130 02/12/22  0543 02/13/22  0952   CREATININE 2.19* 1.96* 1.47*     Serum creatinine: 1.47 mg/dL (H) 02/13/22 0952  Estimated creatinine clearance: 47.9 mL/min (A)    Medication:Levaquin dose: 500mg frequency q48hr will be changed to medication:Levaquin dose:500mg frequency:q24hr    Pharmacist's Name: Jurgen Thorne  Pharmacist's Extension: [phone]

## 2022-02-15 PROBLEM — S31.000A WOUND OF SACRAL REGION: Status: ACTIVE | Noted: 2022-02-15

## 2022-02-15 LAB
ANION GAP SERPL CALCULATED.3IONS-SCNC: 16 MMOL/L (ref 7–16)
ANISOCYTOSIS BLD QL SMEAR: ABNORMAL
BASOPHILS # BLD AUTO: 0.04 K/UL (ref 0–0.2)
BASOPHILS NFR BLD AUTO: 0.2 % (ref 0–1)
BUN SERPL-MCNC: 37 MG/DL (ref 7–18)
BUN/CREAT SERPL: 27 (ref 6–20)
CALCIUM SERPL-MCNC: 10.1 MG/DL (ref 8.5–10.1)
CHLORIDE SERPL-SCNC: 106 MMOL/L (ref 98–107)
CO2 SERPL-SCNC: 27 MMOL/L (ref 21–32)
CREAT SERPL-MCNC: 1.35 MG/DL (ref 0.7–1.3)
DIFFERENTIAL METHOD BLD: ABNORMAL
EOSINOPHIL # BLD AUTO: 0.3 K/UL (ref 0–0.5)
EOSINOPHIL NFR BLD AUTO: 1.9 % (ref 1–4)
ERYTHROCYTE [DISTWIDTH] IN BLOOD BY AUTOMATED COUNT: 20.9 % (ref 11.5–14.5)
GLUCOSE SERPL-MCNC: 103 MG/DL (ref 74–106)
HCT VFR BLD AUTO: 20.5 % (ref 40–54)
HGB BLD-MCNC: 7 G/DL (ref 13.5–18)
HYPOCHROMIA BLD QL SMEAR: ABNORMAL
IMM GRANULOCYTES # BLD AUTO: 0.32 K/UL (ref 0–0.04)
IMM GRANULOCYTES NFR BLD: 2 % (ref 0–0.4)
LYMPHOCYTES # BLD AUTO: 1.38 K/UL (ref 1–4.8)
LYMPHOCYTES NFR BLD AUTO: 8.6 % (ref 27–41)
MCH RBC QN AUTO: 25.1 PG (ref 27–31)
MCHC RBC AUTO-ENTMCNC: 34.1 G/DL (ref 32–36)
MCV RBC AUTO: 73.5 FL (ref 80–96)
MICROCYTES BLD QL SMEAR: ABNORMAL
MONOCYTES # BLD AUTO: 0.51 K/UL (ref 0–0.8)
MONOCYTES NFR BLD AUTO: 3.2 % (ref 2–6)
MPC BLD CALC-MCNC: 10.9 FL (ref 9.4–12.4)
NEUTROPHILS # BLD AUTO: 13.56 K/UL (ref 1.8–7.7)
NEUTROPHILS NFR BLD AUTO: 84.1 % (ref 53–65)
NRBC # BLD AUTO: 0.07 X10E3/UL
NRBC, AUTO (.00): 0.4 %
OVALOCYTES BLD QL SMEAR: ABNORMAL
PLATELET # BLD AUTO: 312 K/UL (ref 150–400)
PLATELET MORPHOLOGY: ABNORMAL
POLYCHROMASIA BLD QL SMEAR: ABNORMAL
POTASSIUM SERPL-SCNC: 2.7 MMOL/L (ref 3.5–5.1)
RBC # BLD AUTO: 2.79 M/UL (ref 4.6–6.2)
SODIUM SERPL-SCNC: 146 MMOL/L (ref 136–145)
TARGETS BLD QL SMEAR: ABNORMAL
VANCOMYCIN TROUGH SERPL-MCNC: 28.6 ΜG/ML (ref 10–20)
WBC # BLD AUTO: 16.11 K/UL (ref 4.5–11)

## 2022-02-15 PROCEDURE — 99222 PR INITIAL HOSPITAL CARE,LEVL II: ICD-10-PCS | Mod: ,,, | Performed by: NURSE PRACTITIONER

## 2022-02-15 PROCEDURE — 11000001 HC ACUTE MED/SURG PRIVATE ROOM

## 2022-02-15 PROCEDURE — 99232 PR SUBSEQUENT HOSPITAL CARE,LEVL II: ICD-10-PCS | Mod: ,,, | Performed by: FAMILY MEDICINE

## 2022-02-15 PROCEDURE — 80048 BASIC METABOLIC PNL TOTAL CA: CPT | Performed by: FAMILY MEDICINE

## 2022-02-15 PROCEDURE — 63600175 PHARM REV CODE 636 W HCPCS: Performed by: FAMILY MEDICINE

## 2022-02-15 PROCEDURE — 25000003 PHARM REV CODE 250: Performed by: HOSPITALIST

## 2022-02-15 PROCEDURE — 27000221 HC OXYGEN, UP TO 24 HOURS

## 2022-02-15 PROCEDURE — 80202 ASSAY OF VANCOMYCIN: CPT | Performed by: FAMILY MEDICINE

## 2022-02-15 PROCEDURE — C9113 INJ PANTOPRAZOLE SODIUM, VIA: HCPCS | Performed by: HOSPITALIST

## 2022-02-15 PROCEDURE — 36415 COLL VENOUS BLD VENIPUNCTURE: CPT | Performed by: FAMILY MEDICINE

## 2022-02-15 PROCEDURE — 94761 N-INVAS EAR/PLS OXIMETRY MLT: CPT

## 2022-02-15 PROCEDURE — 63600175 PHARM REV CODE 636 W HCPCS: Performed by: HOSPITALIST

## 2022-02-15 PROCEDURE — 99232 SBSQ HOSP IP/OBS MODERATE 35: CPT | Mod: ,,, | Performed by: FAMILY MEDICINE

## 2022-02-15 PROCEDURE — 99222 1ST HOSP IP/OBS MODERATE 55: CPT | Mod: ,,, | Performed by: NURSE PRACTITIONER

## 2022-02-15 PROCEDURE — 85025 COMPLETE CBC W/AUTO DIFF WBC: CPT | Performed by: FAMILY MEDICINE

## 2022-02-15 RX ORDER — SODIUM CHLORIDE AND POTASSIUM CHLORIDE 150; 450 MG/100ML; MG/100ML
INJECTION, SOLUTION INTRAVENOUS CONTINUOUS
Status: DISCONTINUED | OUTPATIENT
Start: 2022-02-15 | End: 2022-02-16

## 2022-02-15 RX ADMIN — CEFEPIME HYDROCHLORIDE 1 G: 1 INJECTION, POWDER, FOR SOLUTION INTRAMUSCULAR; INTRAVENOUS at 03:02

## 2022-02-15 RX ADMIN — CLINDAMYCIN PHOSPHATE 600 MG: 600 INJECTION, SOLUTION INTRAVENOUS at 05:02

## 2022-02-15 RX ADMIN — LEVOFLOXACIN 500 MG: 5 INJECTION, SOLUTION INTRAVENOUS at 08:02

## 2022-02-15 RX ADMIN — HEPARIN SODIUM 5000 UNITS: 5000 INJECTION INTRAVENOUS; SUBCUTANEOUS at 09:02

## 2022-02-15 RX ADMIN — POTASSIUM CHLORIDE AND SODIUM CHLORIDE: 450; 150 INJECTION, SOLUTION INTRAVENOUS at 10:02

## 2022-02-15 RX ADMIN — CLINDAMYCIN PHOSPHATE 600 MG: 600 INJECTION, SOLUTION INTRAVENOUS at 11:02

## 2022-02-15 RX ADMIN — PANTOPRAZOLE SODIUM 40 MG: 40 INJECTION, POWDER, FOR SOLUTION INTRAVENOUS at 10:02

## 2022-02-15 RX ADMIN — POTASSIUM CHLORIDE AND SODIUM CHLORIDE: 450; 150 INJECTION, SOLUTION INTRAVENOUS at 11:02

## 2022-02-15 RX ADMIN — CEFEPIME HYDROCHLORIDE 1 G: 1 INJECTION, POWDER, FOR SOLUTION INTRAMUSCULAR; INTRAVENOUS at 12:02

## 2022-02-15 RX ADMIN — VANCOMYCIN HYDROCHLORIDE 1250 MG: 1 INJECTION, POWDER, LYOPHILIZED, FOR SOLUTION INTRAVENOUS at 03:02

## 2022-02-15 RX ADMIN — HEPARIN SODIUM 5000 UNITS: 5000 INJECTION INTRAVENOUS; SUBCUTANEOUS at 08:02

## 2022-02-15 NOTE — HPI
Admitted with aspiration pneumonia patient is nonverbal with contractures bed-bound tube feeds via PEG general surgery consulted for sacral wound

## 2022-02-15 NOTE — ASSESSMENT & PLAN NOTE
Peg placed 9-2021 per dr Villagran, nurse reported that it was leaking, unclogged per dr wilks and catheter tubing trimmed with stopcock reinserted   Flushed well without leaking

## 2022-02-15 NOTE — CONSULTS
[address]  General Surgery  Consult Note    Patient Name: Owen Solis  MRN: [id number]  Code Status: Full Code  Admission Date: 2/11/2022  Hospital Length of Stay: 4 days  Attending Physician: Yfn Richardson Jr., MD  Primary Care Provider: Angelina Jaramillo II, MD    Patient information was obtained from ER records.     Inpatient consult to General Surgery  Consult performed by: SAADIA Chery  Consult ordered by: Yfn Richardson Jr., MD  Reason for consult: sacral wound   Assessment/Recommendations: Bedside debridement per dr wilks        Subjective:     Principal Problem: Aspiration pneumonia of left lower lobe    History of Present Illness: Admitted with aspiration pneumonia patient is nonverbal with contractures bed-bound tube feeds via PEG general surgery consulted for sacral wound      No current facility-administered medications on file prior to encounter.     Current Outpatient Medications on File Prior to Encounter   Medication Sig    acetaminophen (TYLENOL) 500 MG tablet 500 mg by Per G Tube route every 4 (four) hours as needed for Pain. Give 500 mg via PEG-Tube every four hours as needed for fever    benztropine (COGENTIN) 1 MG tablet 0.5 mg by Per G Tube route 2 (two) times daily.    cyclobenzaprine (FLEXERIL) 10 MG tablet 10 mg by Per G Tube route 2 (two) times daily as needed for Muscle spasms.    donepeziL (ARICEPT) 10 MG tablet Take 1 tablet (10 mg total) by mouth every evening.    folic acid (FOLVITE) 1 MG tablet 1 mg by Per G Tube route once daily.    metoprolol succinate (TOPROL-XL) 25 MG 24 hr tablet Take 25 mg by mouth once daily.    multivit-min-ferrous gluconate 9 mg iron/15 mL oral liquid Take 15 mLs by mouth once daily.    omeprazole (PRILOSEC) 20 MG capsule 20 mg once daily.    silver sulfADIAZINE 1% (SILVADENE) 1 % cream Apply topically 2 (two) times daily. Apply to sacral wound two times daily    traZODone (DESYREL) 50 MG tablet Take 25  mg by mouth 3 (three) times daily.       Review of patient's allergies indicates:   Allergen Reactions    Amitriptyline        Past Medical History:   Diagnosis Date    Alzheimer disease     Alzheimer's dementia     Arthritis     Hypertension     Metabolic encephalopathy      Past Surgical History:   Procedure Laterality Date    GASTROSTOMY TUBE PLACEMENT       Family History     Problem Relation (Age of Onset)    COPD Father    Heart disease Mother    Hypertension Mother, Sister        Tobacco Use    Smoking status: Never Smoker    Smokeless tobacco: Never Used   Substance and Sexual Activity    Alcohol use: Not Currently    Drug use: Never    Sexual activity: Not Currently     Review of Systems   Unable to perform ROS: Patient nonverbal     Objective:     Vital Signs (Most Recent):  Temp: 97.9 °F (36.6 °C) (02/15/22 1000)  Pulse: 110 (02/15/22 1000)  Resp: 19 (02/15/22 1000)  BP: (!) 101/58 (02/15/22 1000)  SpO2: 97 % (02/15/22 1000) Vital Signs (24h Range):  Temp:  [97.5 °F (36.4 °C)-98.7 °F (37.1 °C)] 97.9 °F (36.6 °C)  Pulse:  [] 110  Resp:  [18-29] 19  SpO2:  [96 %-100 %] 97 %  BP: (101-121)/(58-78) 101/58     Weight: 65.7 kg (144 lb 13.5 oz)  Body mass index is 22.02 kg/m².    Physical Exam  Constitutional:       Appearance: He is ill-appearing.   HENT:      Head: Normocephalic.   Pulmonary:      Effort: Pulmonary effort is normal.   Abdominal:      Comments: Flat  Peg intact   Musculoskeletal:         General: Deformity present.      Comments: contractures   Skin:     General: Skin is warm and dry.         Significant Labs:  I have reviewed all pertinent lab results within the past 24 hours.  CBC:   Recent Labs   Lab 02/15/22  0505   WBC 16.11*   RBC 2.79*   HGB 7.0*   HCT 20.5*      MCV 73.5*   MCH 25.1*   MCHC 34.1     BMP:   Recent Labs   Lab 02/11/22  2130 02/12/22  0543 02/15/22  0505   *   < > 103   *   < > 146*   K 4.4   < > 2.7*      < > 106   CO2 25   < >  27   BUN 36*   < > 37*   CREATININE 2.19*   < > 1.35*   CALCIUM 11.6*   < > 10.1   MG 2.3  --   --     < > = values in this interval not displayed.       Significant Diagnostics:  I have reviewed all pertinent imaging results/findings within the past 24 hours.    Assessment/Plan:     Wound of sacral region  02/15/2022 eschar no drainage, approx 6.5x3 see images in media from yesterday, dr wilks plans bedside debridement     Dysphagia  Peg placed 9-2021 per dr Villagran, nurse reported that it was leaking, unclogged per dr wilks and catheter tubing trimmed with stopcock reinserted   Flushed well without leaking      VTE Risk Mitigation (From admission, onward)         Ordered     heparin (porcine) injection 5,000 Units  Every 12 hours         02/12/22 0203     IP VTE LOW RISK PATIENT  Once         02/12/22 0203     Place sequential compression device  Until discontinued         02/12/22 0203                Thank you for your consult. I will follow-up with patient. Please contact us if you have any additional questions.    SAADIA Chery  General Surgery  [facility] - [address] [place] [address]

## 2022-02-15 NOTE — ASSESSMENT & PLAN NOTE
EDITH likely secondary to dehydration. Improved with fluids. Na up and K down. Will give 1/2 NS with K till corrected then increase peg flushes.

## 2022-02-15 NOTE — ASSESSMENT & PLAN NOTE
02/15/2022 eschar no drainage, approx 6.5x3 see images in media from yesterday, dr wilks plans bedside debridement

## 2022-02-15 NOTE — SUBJECTIVE & OBJECTIVE
Interval History:  Non verbal.     Review of Systems   Unable to perform ROS: Patient nonverbal     Objective:     Vital Signs (Most Recent):  Temp: 97.9 °F (36.6 °C) (02/15/22 1000)  Pulse: 110 (02/15/22 1000)  Resp: 19 (02/15/22 1000)  BP: (!) 101/58 (02/15/22 1000)  SpO2: 97 % (02/15/22 1000) Vital Signs (24h Range):  Temp:  [97.5 °F (36.4 °C)-98.7 °F (37.1 °C)] 97.9 °F (36.6 °C)  Pulse:  [] 110  Resp:  [18-29] 19  SpO2:  [96 %-100 %] 97 %  BP: (101-121)/(58-78) 101/58     Weight: 65.7 kg (144 lb 13.5 oz)  Body mass index is 22.02 kg/m².    Intake/Output Summary (Last 24 hours) at 2/15/2022 1253  Last data filed at 2/15/2022 0710  Gross per 24 hour   Intake 1147 ml   Output 1200 ml   Net -53 ml      Physical Exam  Vitals reviewed.   Constitutional:       Comments: Cachectic, frail, ill appearing   HENT:      Head: Normocephalic and atraumatic.      Mouth/Throat:      Mouth: Mucous membranes are dry.   Eyes:      General: No scleral icterus.     Extraocular Movements: Extraocular movements intact.      Pupils: Pupils are equal, round, and reactive to light.   Cardiovascular:      Rate and Rhythm: Regular rhythm. Tachycardia present.      Heart sounds: Normal heart sounds.   Pulmonary:      Breath sounds: Rhonchi present. No wheezing or rales.      Comments: ronchi have improved.   Abdominal:      Tenderness: There is no abdominal tenderness. There is no guarding or rebound.      Comments: PEG tube in place   Musculoskeletal:      Right lower leg: No edema.      Left lower leg: No edema.   Skin:     General: Skin is warm and dry.      Findings: Lesion present.      Comments: Sacral ulcer, unstageable.         Significant Labs:   All pertinent labs within the past 24 hours have been reviewed.  BMP:   Recent Labs   Lab 02/15/22  0505      *   K 2.7*      CO2 27   BUN 37*   CREATININE 1.35*   CALCIUM 10.1     CBC:   Recent Labs   Lab 02/13/22  1312 02/15/22  0505   WBC 24.80* 16.11*   HGB 6.5*  7.0*   HCT 19.5* 20.5*    312       Significant Imaging: I have reviewed all pertinent imaging results/findings within the past 24 hours.

## 2022-02-15 NOTE — SUBJECTIVE & OBJECTIVE
No current facility-administered medications on file prior to encounter.     Current Outpatient Medications on File Prior to Encounter   Medication Sig    acetaminophen (TYLENOL) 500 MG tablet 500 mg by Per G Tube route every 4 (four) hours as needed for Pain. Give 500 mg via PEG-Tube every four hours as needed for fever    benztropine (COGENTIN) 1 MG tablet 0.5 mg by Per G Tube route 2 (two) times daily.    cyclobenzaprine (FLEXERIL) 10 MG tablet 10 mg by Per G Tube route 2 (two) times daily as needed for Muscle spasms.    donepeziL (ARICEPT) 10 MG tablet Take 1 tablet (10 mg total) by mouth every evening.    folic acid (FOLVITE) 1 MG tablet 1 mg by Per G Tube route once daily.    metoprolol succinate (TOPROL-XL) 25 MG 24 hr tablet Take 25 mg by mouth once daily.    multivit-min-ferrous gluconate 9 mg iron/15 mL oral liquid Take 15 mLs by mouth once daily.    omeprazole (PRILOSEC) 20 MG capsule 20 mg once daily.    silver sulfADIAZINE 1% (SILVADENE) 1 % cream Apply topically 2 (two) times daily. Apply to sacral wound two times daily    traZODone (DESYREL) 50 MG tablet Take 25 mg by mouth 3 (three) times daily.       Review of patient's allergies indicates:   Allergen Reactions    Amitriptyline        Past Medical History:   Diagnosis Date    Alzheimer disease     Alzheimer's dementia     Arthritis     Hypertension     Metabolic encephalopathy      Past Surgical History:   Procedure Laterality Date    GASTROSTOMY TUBE PLACEMENT       Family History     Problem Relation (Age of Onset)    COPD Father    Heart disease Mother    Hypertension Mother, Sister        Tobacco Use    Smoking status: Never Smoker    Smokeless tobacco: Never Used   Substance and Sexual Activity    Alcohol use: Not Currently    Drug use: Never    Sexual activity: Not Currently     Review of Systems   Unable to perform ROS: Patient nonverbal     Objective:     Vital Signs (Most Recent):  Temp: 97.9 °F (36.6 °C) (02/15/22  1000)  Pulse: 110 (02/15/22 1000)  Resp: 19 (02/15/22 1000)  BP: (!) 101/58 (02/15/22 1000)  SpO2: 97 % (02/15/22 1000) Vital Signs (24h Range):  Temp:  [97.5 °F (36.4 °C)-98.7 °F (37.1 °C)] 97.9 °F (36.6 °C)  Pulse:  [] 110  Resp:  [18-29] 19  SpO2:  [96 %-100 %] 97 %  BP: (101-121)/(58-78) 101/58     Weight: 65.7 kg (144 lb 13.5 oz)  Body mass index is 22.02 kg/m².    Physical Exam  Constitutional:       Appearance: He is ill-appearing.   HENT:      Head: Normocephalic.   Pulmonary:      Effort: Pulmonary effort is normal.   Abdominal:      Comments: Flat  Peg intact   Musculoskeletal:         General: Deformity present.      Comments: contractures   Skin:     General: Skin is warm and dry.         Significant Labs:  I have reviewed all pertinent lab results within the past 24 hours.  CBC:   Recent Labs   Lab 02/15/22  0505   WBC 16.11*   RBC 2.79*   HGB 7.0*   HCT 20.5*      MCV 73.5*   MCH 25.1*   MCHC 34.1     BMP:   Recent Labs   Lab 02/11/22  2130 02/12/22  0543 02/15/22  0505   *   < > 103   *   < > 146*   K 4.4   < > 2.7*      < > 106   CO2 25   < > 27   BUN 36*   < > 37*   CREATININE 2.19*   < > 1.35*   CALCIUM 11.6*   < > 10.1   MG 2.3  --   --     < > = values in this interval not displayed.       Significant Diagnostics:  I have reviewed all pertinent imaging results/findings within the past 24 hours.

## 2022-02-15 NOTE — PROGRESS NOTES
[address] Medicine  Progress Note    Patient Name: Owen Solis  MRN: [id number]  Patient Class: IP- Inpatient   Admission Date: 2/11/2022  Length of Stay: 4 days  Attending Physician: Yfn Richardson Jr., MD  Primary Care Provider: Angelina Jaramillo II, MD        Subjective:     Principal Problem:Aspiration pneumonia of left lower lobe        HPI:    Mr. Solis is a 62yo M who presents from [facility] with shortness of breath and hypoxia.     The patient was recently discharged from [facility] on 2/7 after being treated for UTI, HCAP believed to be secondary to aspiration, and dehydration. He presents similarly today. Patient has had failure to thrive post-COVID and currently has a PEG for tube feedings. During last admission, patient was suctioned and food remnants were found. Upon suctioning on arrival today they were found again. Patient has known dysphagia only receiving PEG feeds and was unable to pass a swallow evaluation during the last hospitalization. Patient turns his head from me during exam but does not otherwise respond. Patient has a PMH of Alzheimer's dementia, COVID-19, HTN.     On arrival, patient was febrile to 102.9 and tachycardic to 140. EKG with sinus tachycardia. Leukocytosis of 32, Na 146, BUN/Cr 36/2.19 with GFR 32. CXR with a large L sided PNA. Lactic acid initially 6.9 and repeat 5.8. Patient has received 2L NS bolus and zosyn in the ED. Will admit patient to the hospitalist service for further IV antibiotics and IVF resuscitation.       Overview/Hospital Course:  No notes on file    Interval History:  Non verbal.     Review of Systems   Unable to perform ROS: Patient nonverbal     Objective:     Vital Signs (Most Recent):  Temp: 97.9 °F (36.6 °C) (02/15/22 1000)  Pulse: 110 (02/15/22 1000)  Resp: 19 (02/15/22 1000)  BP: (!) 101/58 (02/15/22 1000)  SpO2: 97 % (02/15/22 1000) Vital Signs (24h Range):  Temp:  [97.5 °F (36.4 °C)-98.7 °F (37.1 °C)] 97.9 °F  (36.6 °C)  Pulse:  [] 110  Resp:  [18-29] 19  SpO2:  [96 %-100 %] 97 %  BP: (101-121)/(58-78) 101/58     Weight: 65.7 kg (144 lb 13.5 oz)  Body mass index is 22.02 kg/m².    Intake/Output Summary (Last 24 hours) at 2/15/2022 1253  Last data filed at 2/15/2022 0710  Gross per 24 hour   Intake 1147 ml   Output 1200 ml   Net -53 ml      Physical Exam  Vitals reviewed.   Constitutional:       Comments: Cachectic, frail, ill appearing   HENT:      Head: Normocephalic and atraumatic.      Mouth/Throat:      Mouth: Mucous membranes are dry.   Eyes:      General: No scleral icterus.     Extraocular Movements: Extraocular movements intact.      Pupils: Pupils are equal, round, and reactive to light.   Cardiovascular:      Rate and Rhythm: Regular rhythm. Tachycardia present.      Heart sounds: Normal heart sounds.   Pulmonary:      Breath sounds: Rhonchi present. No wheezing or rales.      Comments: ronchi have improved.   Abdominal:      Tenderness: There is no abdominal tenderness. There is no guarding or rebound.      Comments: PEG tube in place   Musculoskeletal:      Right lower leg: No edema.      Left lower leg: No edema.   Skin:     General: Skin is warm and dry.      Findings: Lesion present.      Comments: Sacral ulcer, unstageable.         Significant Labs:   All pertinent labs within the past 24 hours have been reviewed.  BMP:   Recent Labs   Lab 02/15/22  0505      *   K 2.7*      CO2 27   BUN 37*   CREATININE 1.35*   CALCIUM 10.1     CBC:   Recent Labs   Lab 02/13/22  1312 02/15/22  0505   WBC 24.80* 16.11*   HGB 6.5* 7.0*   HCT 19.5* 20.5*    312       Significant Imaging: I have reviewed all pertinent imaging results/findings within the past 24 hours.      Assessment/Plan:      * Aspiration pneumonia of left lower lobe  Large L lobar PNA on CXR. Patient hypoxic requiring supplemental O2. Febrile to 102.9 with leukocytosis of 32 on admission. Lactic acid 6.9 -> 5.8. Concern for  aspiration as food particles were suctioned out on arrival.      - Abx: Vancomycin, cefepime, clindamycin, levaquin  - Blood cultures pending   - Supplemental oxygen PRN  - NPO   - tube feeding resumed.   - Improving.     EDITH (acute kidney injury)  EDITH likely secondary to dehydration. Improved with fluids. Na up and K down. Will give 1/2 NS with K till corrected then increase peg flushes.     Sacral wound  - Wound care consulted for this unstageable wound.       Hypernatremia  Mild hypernatremia of 146, will increase flushes after recorrected.     Dysphagia  - Patient with known dysphagia and continued concern for aspiration PNA   - PEG in place  - Nutrition consult for tube feeds  - NPO       VTE Risk Mitigation (From admission, onward)         Ordered     heparin (porcine) injection 5,000 Units  Every 12 hours         02/12/22 0203     IP VTE LOW RISK PATIENT  Once         02/12/22 0203     Place sequential compression device  Until discontinued         02/12/22 0203                Discharge Planning   ESTEFANY:      Code Status: Full Code   Is the patient medically ready for discharge?:     Reason for patient still in hospital (select all that apply): Treatment  Discharge Plan A: Return to nursing home                  Yfn Richardson Jr, MD  Department of Hospital Medicine   [address]

## 2022-02-15 NOTE — PLAN OF CARE
Problem: Adult Inpatient Plan of Care  Goal: Plan of Care Review  Outcome: Ongoing, Progressing  Goal: Patient-Specific Goal (Individualized)  Outcome: Ongoing, Progressing  Goal: Absence of Hospital-Acquired Illness or Injury  Outcome: Ongoing, Progressing  Goal: Optimal Comfort and Wellbeing  Outcome: Ongoing, Progressing  Goal: Readiness for Transition of Care  Outcome: Ongoing, Progressing     Problem: Skin Injury Risk Increased  Goal: Skin Health and Integrity  Outcome: Ongoing, Progressing     Problem: Oral Intake Inadequate (Acute Kidney Injury/Impairment)  Goal: Optimal Nutrition Intake  Outcome: Ongoing, Progressing     Problem: Fluid Imbalance (Pneumonia)  Goal: Fluid Balance  Outcome: Ongoing, Progressing     Problem: Respiratory Compromise (Pneumonia)  Goal: Effective Oxygenation and Ventilation  Outcome: Ongoing, Progressing     Problem: Infection  Goal: Absence of Infection Signs and Symptoms  Outcome: Ongoing, Progressing

## 2022-02-15 NOTE — PLAN OF CARE
Problem: Fall Injury Risk  Goal: Absence of Fall and Fall-Related Injury  Outcome: Ongoing, Not Progressing     Problem: Skin Injury Risk Increased  Goal: Skin Health and Integrity  Outcome: Ongoing, Not Progressing     Problem: Fluid and Electrolyte Imbalance (Acute Kidney Injury/Impairment)  Goal: Fluid and Electrolyte Balance  Outcome: Ongoing, Not Progressing     Problem: Renal Function Impairment (Acute Kidney Injury/Impairment)  Goal: Effective Renal Function  Outcome: Ongoing, Not Progressing     Problem: Fluid Imbalance (Pneumonia)  Goal: Fluid Balance  Outcome: Ongoing, Not Progressing     Problem: Oral Intake Inadequate (Acute Kidney Injury/Impairment)  Goal: Optimal Nutrition Intake  Outcome: Ongoing, Not Progressing

## 2022-02-15 NOTE — PROGRESS NOTES
Pharmacy dosing vancomycin for pneumonia.Trough level is not accurate because bag was already hanging when drawn. Trough level rescheduled for 2/16 at 0230. Pharmacy will continue to follow and dose.

## 2022-02-15 NOTE — ASSESSMENT & PLAN NOTE
Large L lobar PNA on CXR. Patient hypoxic requiring supplemental O2. Febrile to 102.9 with leukocytosis of 32 on admission. Lactic acid 6.9 -> 5.8. Concern for aspiration as food particles were suctioned out on arrival.      - Abx: Vancomycin, cefepime, clindamycin, levaquin  - Blood cultures pending   - Supplemental oxygen PRN  - NPO   - tube feeding resumed.   - Improving.

## 2022-02-16 PROBLEM — E87.0 HYPERNATREMIA: Status: RESOLVED | Noted: 2022-02-12 | Resolved: 2022-02-16

## 2022-02-16 LAB
ANION GAP SERPL CALCULATED.3IONS-SCNC: 14 MMOL/L (ref 7–16)
BUN SERPL-MCNC: 40 MG/DL (ref 7–18)
BUN/CREAT SERPL: 28 (ref 6–20)
CALCIUM SERPL-MCNC: 9.4 MG/DL (ref 8.5–10.1)
CHLORIDE SERPL-SCNC: 108 MMOL/L (ref 98–107)
CO2 SERPL-SCNC: 26 MMOL/L (ref 21–32)
CREAT SERPL-MCNC: 1.45 MG/DL (ref 0.7–1.3)
GLUCOSE SERPL-MCNC: 153 MG/DL (ref 70–105)
GLUCOSE SERPL-MCNC: 88 MG/DL (ref 74–106)
POTASSIUM SERPL-SCNC: 3.3 MMOL/L (ref 3.5–5.1)
SODIUM SERPL-SCNC: 145 MMOL/L (ref 136–145)
VANCOMYCIN TROUGH SERPL-MCNC: 24.7 ΜG/ML (ref 10–20)

## 2022-02-16 PROCEDURE — 63600175 PHARM REV CODE 636 W HCPCS: Performed by: FAMILY MEDICINE

## 2022-02-16 PROCEDURE — 99232 PR SUBSEQUENT HOSPITAL CARE,LEVL II: ICD-10-PCS | Mod: ,,, | Performed by: FAMILY MEDICINE

## 2022-02-16 PROCEDURE — 25000003 PHARM REV CODE 250: Performed by: HOSPITALIST

## 2022-02-16 PROCEDURE — 63600175 PHARM REV CODE 636 W HCPCS: Performed by: HOSPITALIST

## 2022-02-16 PROCEDURE — C9113 INJ PANTOPRAZOLE SODIUM, VIA: HCPCS | Performed by: HOSPITALIST

## 2022-02-16 PROCEDURE — 99232 SBSQ HOSP IP/OBS MODERATE 35: CPT | Mod: ,,, | Performed by: FAMILY MEDICINE

## 2022-02-16 PROCEDURE — 80048 BASIC METABOLIC PNL TOTAL CA: CPT | Performed by: FAMILY MEDICINE

## 2022-02-16 PROCEDURE — 11000001 HC ACUTE MED/SURG PRIVATE ROOM

## 2022-02-16 PROCEDURE — 36415 COLL VENOUS BLD VENIPUNCTURE: CPT | Performed by: FAMILY MEDICINE

## 2022-02-16 PROCEDURE — 80202 ASSAY OF VANCOMYCIN: CPT | Performed by: FAMILY MEDICINE

## 2022-02-16 PROCEDURE — 82962 GLUCOSE BLOOD TEST: CPT

## 2022-02-16 RX ADMIN — PANTOPRAZOLE SODIUM 40 MG: 40 INJECTION, POWDER, FOR SOLUTION INTRAVENOUS at 08:02

## 2022-02-16 RX ADMIN — LEVOFLOXACIN 500 MG: 5 INJECTION, SOLUTION INTRAVENOUS at 07:02

## 2022-02-16 RX ADMIN — CEFEPIME HYDROCHLORIDE 1 G: 1 INJECTION, POWDER, FOR SOLUTION INTRAMUSCULAR; INTRAVENOUS at 02:02

## 2022-02-16 RX ADMIN — HEPARIN SODIUM 5000 UNITS: 5000 INJECTION INTRAVENOUS; SUBCUTANEOUS at 08:02

## 2022-02-16 RX ADMIN — CLINDAMYCIN PHOSPHATE 600 MG: 600 INJECTION, SOLUTION INTRAVENOUS at 06:02

## 2022-02-16 RX ADMIN — CLINDAMYCIN PHOSPHATE 600 MG: 600 INJECTION, SOLUTION INTRAVENOUS at 01:02

## 2022-02-16 RX ADMIN — VANCOMYCIN HYDROCHLORIDE 1250 MG: 1 INJECTION, POWDER, LYOPHILIZED, FOR SOLUTION INTRAVENOUS at 03:02

## 2022-02-16 RX ADMIN — CLINDAMYCIN PHOSPHATE 600 MG: 600 INJECTION, SOLUTION INTRAVENOUS at 12:02

## 2022-02-16 NOTE — PROGRESS NOTES
[address] Medicine  Progress Note    Patient Name: Owen Solis  MRN: [id number]  Patient Class: IP- Inpatient   Admission Date: 2/11/2022  Length of Stay: 5 days  Attending Physician: Yfn Richardson Jr., MD  Primary Care Provider: Angelina Jaramillo II, MD        Subjective:     Principal Problem:Aspiration pneumonia of left lower lobe        HPI:    Mr. Solis is a 62yo M who presents from [facility] with shortness of breath and hypoxia.     The patient was recently discharged from [facility] on 2/7 after being treated for UTI, HCAP believed to be secondary to aspiration, and dehydration. He presents similarly today. Patient has had failure to thrive post-COVID and currently has a PEG for tube feedings. During last admission, patient was suctioned and food remnants were found. Upon suctioning on arrival today they were found again. Patient has known dysphagia only receiving PEG feeds and was unable to pass a swallow evaluation during the last hospitalization. Patient turns his head from me during exam but does not otherwise respond. Patient has a PMH of Alzheimer's dementia, COVID-19, HTN.     On arrival, patient was febrile to 102.9 and tachycardic to 140. EKG with sinus tachycardia. Leukocytosis of 32, Na 146, BUN/Cr 36/2.19 with GFR 32. CXR with a large L sided PNA. Lactic acid initially 6.9 and repeat 5.8. Patient has received 2L NS bolus and zosyn in the ED. Will admit patient to the hospitalist service for further IV antibiotics and IVF resuscitation.       Overview/Hospital Course:  No notes on file    Interval History: Non verbal. No problems reported by nursing.     Review of Systems   Unable to perform ROS: Patient nonverbal     Objective:     Vital Signs (Most Recent):  Temp: 99 °F (37.2 °C) (02/16/22 1200)  Pulse: (!) 112 (02/16/22 1200)  Resp: 20 (02/16/22 1200)  BP: 112/69 (02/16/22 1200)  SpO2: 100 % (02/16/22 1200) Vital Signs (24h Range):  Temp:  [97.2 °F (36.2  °C)-99.8 °F (37.7 °C)] 99 °F (37.2 °C)  Pulse:  [110-119] 112  Resp:  [20-24] 20  SpO2:  [98 %-100 %] 100 %  BP: ()/(59-71) 112/69     Weight: 68.9 kg (151 lb 14.4 oz)  Body mass index is 23.1 kg/m².    Intake/Output Summary (Last 24 hours) at 2/16/2022 1252  Last data filed at 2/16/2022 0611  Gross per 24 hour   Intake --   Output 1200 ml   Net -1200 ml      Physical Exam  Constitutional:       Appearance: He is ill-appearing.   HENT:      Head: Normocephalic.   Cardiovascular:      Rate and Rhythm: Normal rate and regular rhythm.   Pulmonary:      Effort: Pulmonary effort is normal.      Breath sounds: Rhonchi and rales present. No wheezing.   Abdominal:      General: Bowel sounds are normal. There is no distension.      Palpations: Abdomen is soft.      Tenderness: There is no guarding.      Comments: Flat  Peg intact   Musculoskeletal:         General: Deformity present.      Comments: contractures   Skin:     General: Skin is warm and dry.   Neurological:      Mental Status: Mental status is at baseline.         Significant Labs:   All pertinent labs within the past 24 hours have been reviewed.  BMP:   Recent Labs   Lab 02/16/22  0334   GLU 88      K 3.3*   *   CO2 26   BUN 40*   CREATININE 1.45*   CALCIUM 9.4     CBC:   Recent Labs   Lab 02/15/22  0505   WBC 16.11*   HGB 7.0*   HCT 20.5*          Significant Imaging: I have reviewed all pertinent imaging results/findings within the past 24 hours.      Assessment/Plan:      * Aspiration pneumonia of left lower lobe  Large L lobar PNA on CXR. Patient hypoxic requiring supplemental O2. Febrile to 102.9 with leukocytosis of 32 on admission. Lactic acid 6.9 -> 5.8. Concern for aspiration as food particles were suctioned out on arrival.      - Abx: Vancomycin, cefepime, clindamycin, levaquin  - Blood cultures no growth   - Supplemental oxygen PRN  - NPO   - tube feeding resumed.   - Improving slowly. Plan 7 days abx.    EDITH (acute kidney  injury)  EDITH likely secondary to dehydration. Improved with fluids. Na improved overnight with IV fluids. Will d/c IVF and increase flushes to 250.q 4.     Sacral wound  - Wound care consulted for this unstageable wound. Bedside debridement performed by surgery yesterday.       Dysphagia  - Patient with known dysphagia and continued concern for aspiration PNA   - PEG in place with feedings going. Increasing free water flushes.  - NPO     Microcytic Anemia      Will check iron studies and occult blood. No overt evidence of active bleeding. Transfuse for Hg < 7. Consider GI eval when medically improved or evidence active bleeding.     VTE Risk Mitigation (From admission, onward)         Ordered     heparin (porcine) injection 5,000 Units  Every 12 hours         02/12/22 0203     IP VTE LOW RISK PATIENT  Once         02/12/22 0203     Place sequential compression device  Until discontinued         02/12/22 0203                Discharge Planning   ESTEFANY:      Code Status: Full Code   Is the patient medically ready for discharge?:     Reason for patient still in hospital (select all that apply): Treatment  Discharge Plan A: Return to nursing home                  Yfn Richardson Jr, MD  Department of Hospital Medicine   [address]

## 2022-02-16 NOTE — NURSING
Dr. Escalante and Umesh Salvador performed bedside debridement on pt.'s sacral wound. Peg tube unclogged per Dr. Escalante.     Consent for procedure was rec'd from pt.'s sister Lesli over telephone per Halley Curry RN.

## 2022-02-16 NOTE — SUBJECTIVE & OBJECTIVE
Interval History: Non verbal. No problems reported by nursing.     Review of Systems   Unable to perform ROS: Patient nonverbal     Objective:     Vital Signs (Most Recent):  Temp: 99 °F (37.2 °C) (02/16/22 1200)  Pulse: (!) 112 (02/16/22 1200)  Resp: 20 (02/16/22 1200)  BP: 112/69 (02/16/22 1200)  SpO2: 100 % (02/16/22 1200) Vital Signs (24h Range):  Temp:  [97.2 °F (36.2 °C)-99.8 °F (37.7 °C)] 99 °F (37.2 °C)  Pulse:  [110-119] 112  Resp:  [20-24] 20  SpO2:  [98 %-100 %] 100 %  BP: ()/(59-71) 112/69     Weight: 68.9 kg (151 lb 14.4 oz)  Body mass index is 23.1 kg/m².    Intake/Output Summary (Last 24 hours) at 2/16/2022 1252  Last data filed at 2/16/2022 0611  Gross per 24 hour   Intake --   Output 1200 ml   Net -1200 ml      Physical Exam  Constitutional:       Appearance: He is ill-appearing.   HENT:      Head: Normocephalic.   Cardiovascular:      Rate and Rhythm: Normal rate and regular rhythm.   Pulmonary:      Effort: Pulmonary effort is normal.      Breath sounds: Rhonchi and rales present. No wheezing.   Abdominal:      General: Bowel sounds are normal. There is no distension.      Palpations: Abdomen is soft.      Tenderness: There is no guarding.      Comments: Flat  Peg intact   Musculoskeletal:         General: Deformity present.      Comments: contractures   Skin:     General: Skin is warm and dry.   Neurological:      Mental Status: Mental status is at baseline.         Significant Labs:   All pertinent labs within the past 24 hours have been reviewed.  BMP:   Recent Labs   Lab 02/16/22  0334   GLU 88      K 3.3*   *   CO2 26   BUN 40*   CREATININE 1.45*   CALCIUM 9.4     CBC:   Recent Labs   Lab 02/15/22  0505   WBC 16.11*   HGB 7.0*   HCT 20.5*          Significant Imaging: I have reviewed all pertinent imaging results/findings within the past 24 hours.

## 2022-02-16 NOTE — ASSESSMENT & PLAN NOTE
Large L lobar PNA on CXR. Patient hypoxic requiring supplemental O2. Febrile to 102.9 with leukocytosis of 32 on admission. Lactic acid 6.9 -> 5.8. Concern for aspiration as food particles were suctioned out on arrival.      - Abx: Vancomycin, cefepime, clindamycin, levaquin  - Blood cultures no growth   - Supplemental oxygen PRN  - NPO   - tube feeding resumed.   - Improving slowly. Plan 7 days abx.

## 2022-02-16 NOTE — ASSESSMENT & PLAN NOTE
- Wound care consulted for this unstageable wound. Bedside debridement performed by surgery yesterday.

## 2022-02-16 NOTE — CONSULTS
[facility] - [address] [place] [address]  Adult Nutrition  Consult Note         Reason for Assessment  Reason For Assessment: consult   Nutrition Risk Screen: tube feeding or parenteral nutrition  Malnutrition  Is Patient Malnourished: No  Skin Integrity  Pancho Risk Assessment  Sensory Perception: 2-->very limited  Moisture: 3-->occasionally moist  Activity: 1-->bedfast  Mobility: 1-->completely immobile  Nutrition: 2-->probably inadequate  Friction and Shear: 1-->problem  Pancho Score: 10  Comments on skin integrity: multiple wounds  Nutrition Diagnosis  Increased protein Needs   related to Decreased/ impaired skin integrity as evidenced by multiple wounds    Nutrition Diagnosis Status: New      Comments: pt has multiple wounds and is on PEG feedings  Nutrition Risk  Level of Risk/Frequency of Follow-up: moderate - high  Comments on nutrition risk: EN is primary nutrition   Recent Labs   Lab 02/14/22  1106 02/15/22  0505 02/16/22  0334   GLU  --    < > 88   POCGLU 148*  --   --     < > = values in this interval not displayed.     Comments on Glucose: elevated  Nutrition Prescription / Recommendations  Recommendation/Intervention: continue PEG feedings and add diet. physician increased free water to 250ml q4h.  Goals: Weight maintenance, wound healing, EN tolerance, EN to goal  Nutrition Goal Status: new  Communication of DELGADO Recs: reviewed with RN  Current Diet Order: NPO/ PEG tube feeding  Nutrition Order Comments: PEG tube feeding: Pivot 1.5 @ 50ml / hr; H20 flush of 150ml q4hr    Recommended Diet: Enteral Nutrition  Recommended Oral Supplement: No Oral Supplements  Is Nutrition Support Recommended: Yes   Needs Calculated  Energy Need Method: Anette Pearson Energy Calorie Requirements (kcal): 1752  Protein Requirements: 69-83  Enteral Nutrition   Enteral Nutrition Formula Provides:  1800 kcals  113 g Protein  202 g Carbohydrates  60 g Fat Propofol Rate: No  905 ml Fluid without Flush    900 ml Fluid by flush    1805 ml Total Fluid  Enteral Nutrition Recommended Order:  Tube feeding via PEG/ Gastrostomy  Tube feeding formula: Pivot 1.5 Continuous 50 ml/h  Free Water Flush: 75 ml every 2 hours  Modular Supplements:No Modular Supplements needed  Enteral Nutrition meets needs?: yes  Enteral Nutrition Status: New Order    Is Education Recommended: No  Monitor and Evaluation  % current Intake: Enteral Nutrition at goal  % intake to meet estimated needs: Enteral Nutrition   Food and Nutrient Intake: enteral nutrition intake,food and beverage intake  Food and Nutrient Adminstration: enteral and parenteral nutrition administration  Anthropometric Measurements: weight change  Biochemical Data, Medical Tests and Procedures: electrolyte and renal panel,glucose/endocrine profile  Nutrition-Focused Physical Findings: overall appearance,skin  Enteral Calories (kcal): 1800  Enteral Protein (gm): 112  Enteral (Free Water) Fluid (mL): 900  Free Water Flush Fluid (mL): 900  Parenteral Calories (kcal): 1800  Energy Calories Required: meeting needs  Protein Required: meeting needs  Fluid Required: meeting needs  Tolerance: tolerating  Current Medical Diagnosis and Past Medical History  Diagnosis: psychological disorder  Past Medical History:   Diagnosis Date    Alzheimer disease     Alzheimer's dementia     Arthritis     Hypertension     Metabolic encephalopathy      Nutrition/Diet History  Spiritual, Cultural Beliefs, Yazidism Practices, Values that Affect Care: no  Food Allergies: NKFA  Factors Affecting Nutritional Intake: None identified at this time  Lab/Procedures/Meds  Recent Labs   Lab 02/14/22  1106 02/15/22  0505 02/16/22  0334   NA  --    < > 145   K  --    < > 3.3*   BUN  --    < > 40*   CREATININE  --    < > 1.45*   GLU  --    < > 88   POCGLU 148*  --   --    CALCIUM  --    < > 9.4   CL  --    < > 108*    < > = values in this interval not displayed.     Last A1c: No results found for: HGBA1C  Lab Results   Component Value  "Date    RBC 2.79 (L) 02/15/2022    HGB 7.0 (L) 02/15/2022    HCT 20.5 (L) 02/15/2022    MCV 73.5 (L) 02/15/2022    MCH 25.1 (L) 02/15/2022    MCHC 34.1 02/15/2022     Pertinent Labs Reviewed: reviewed  Pertinent Labs Comments: BUN 39, Cr 1.96, Glu 120, Alb 1.9  Pertinent Medications Reviewed: reviewed  Anthropometrics  Temp: 99 °F (37.2 °C)  Height Method: Estimated  Height: 5' 8" (172.7 cm)  Height (inches): 68 in  Weight Method: Bed Scale  Weight: 69 kg (152 lb 1.9 oz)  Weight (lb): 152.12 lb  Ideal Body Weight (IBW), Male: 154 lb  % Ideal Body Weight, Male (lb): 98.78 %  BMI (Calculated): 23.1  BMI Grade: 18.5-24.9 - normal     Estimated/Assessed Needs  RMR ([place]-St. Jeor Equation): 1459.5 Activity Factor: 1.2 Injury Factor: 1     Temp: 99 °F (37.2 °C)Temporal  Weight Used For Calorie Calculations: 69 kg (152 lb 1.9 oz)   Energy Need Method: [place]-St Jeor Energy Calorie Requirements (kcal): 1752  Weight Used For Protein Calculations: 69 kg (152 lb 1.9 oz)  Protein Requirements: 69-83  Estimated Fluid Requirement Method: RDA Method Fluid Requirements (mL): 1645  RDA Method (mL): 1752     Nutrition by Nursing  Diet/Nutrition Received: (P) tube feeding           Last Bowel Movement: (P) 02/15/22       Gastrostomy/Enterostomy 09/20/21 1214 Percutaneous endoscopic gastrostomy (PEG) LUQ feeding-Feeding Type: (P) continuous       Gastrostomy/Enterostomy 09/20/21 1214 Percutaneous endoscopic gastrostomy (PEG) LUQ feeding-Current Rate (mL/hr): (P) 50 mL/hr       Gastrostomy/Enterostomy 09/20/21 1214 Percutaneous endoscopic gastrostomy (PEG) LUQ feeding-Goal Rate (mL/hr): (P) 50 mL/hr       Gastrostomy/Enterostomy 09/20/21 1214 Percutaneous endoscopic gastrostomy (PEG) LUQ feeding-Formula Name: (P) Pivot 1.5  Nutrition Follow-Up  RD Follow-up?: Yes  Assessment and Plan  No new Assessment & Plan notes have been filed under this hospital service since the last note was generated.  Service: Nutrition     "

## 2022-02-16 NOTE — PROGRESS NOTES
Vancomycin trough (24.7) above desired range of 15-20 so we will adjust dosage to Vancomycin 1250mg IV q36hrs.  Renal function improving.  We will continue to monitor daily and adjust as needed.

## 2022-02-16 NOTE — ASSESSMENT & PLAN NOTE
- Patient with known dysphagia and continued concern for aspiration PNA   - PEG in place with feedings going. Increasing free water flushes.  - NPO

## 2022-02-16 NOTE — PLAN OF CARE
SS spoke with Eliezer at [facility] who states pt is ok to come back once d/c ready, must be back in the facility within 14 days d/t bed hold policy.

## 2022-02-16 NOTE — ASSESSMENT & PLAN NOTE
EDITH likely secondary to dehydration. Improved with fluids. Na improved overnight with IV fluids. Will d/c IVF and increase flushes to 250.q 4.

## 2022-02-17 ENCOUNTER — ANESTHESIA EVENT (OUTPATIENT)
Dept: GASTROENTEROLOGY | Facility: HOSPITAL | Age: 62
DRG: 178 | End: 2022-02-17
Payer: MEDICARE

## 2022-02-17 ENCOUNTER — ANESTHESIA (OUTPATIENT)
Dept: GASTROENTEROLOGY | Facility: HOSPITAL | Age: 62
DRG: 178 | End: 2022-02-17
Payer: MEDICARE

## 2022-02-17 PROBLEM — K94.20 COMPLICATION OF GASTROSTOMY: Status: ACTIVE | Noted: 2022-02-17

## 2022-02-17 PROBLEM — D50.9 MICROCYTIC ANEMIA: Status: ACTIVE | Noted: 2022-02-17

## 2022-02-17 LAB
ABO + RH BLD: NORMAL
ANION GAP SERPL CALCULATED.3IONS-SCNC: 15 MMOL/L (ref 7–16)
ANISOCYTOSIS BLD QL SMEAR: ABNORMAL
BACTERIA BLD CULT: NORMAL
BACTERIA BLD CULT: NORMAL
BASOPHILS # BLD AUTO: 0.03 K/UL (ref 0–0.2)
BASOPHILS NFR BLD AUTO: 0.2 % (ref 0–1)
BLD PROD TYP BPU: NORMAL
BLOOD UNIT EXPIRATION DATE: NORMAL
BLOOD UNIT TYPE CODE: 5100
BUN SERPL-MCNC: 41 MG/DL (ref 7–18)
BUN/CREAT SERPL: 34 (ref 6–20)
CALCIUM SERPL-MCNC: 9.6 MG/DL (ref 8.5–10.1)
CHLORIDE SERPL-SCNC: 108 MMOL/L (ref 98–107)
CO2 SERPL-SCNC: 25 MMOL/L (ref 21–32)
CREAT SERPL-MCNC: 1.22 MG/DL (ref 0.7–1.3)
CROSSMATCH INTERPRETATION: NORMAL
DACRYOCYTES BLD QL SMEAR: ABNORMAL
DIFFERENTIAL METHOD BLD: ABNORMAL
DISPENSE STATUS: NORMAL
EOSINOPHIL # BLD AUTO: 0.28 K/UL (ref 0–0.5)
EOSINOPHIL NFR BLD AUTO: 2.1 % (ref 1–4)
EOSINOPHIL NFR BLD MANUAL: 2 % (ref 1–4)
ERYTHROCYTE [DISTWIDTH] IN BLOOD BY AUTOMATED COUNT: 21.2 % (ref 11.5–14.5)
FERRITIN SERPL-MCNC: 1412 NG/ML (ref 26–388)
GLUCOSE SERPL-MCNC: 131 MG/DL (ref 70–105)
GLUCOSE SERPL-MCNC: 132 MG/DL (ref 74–106)
GLUCOSE SERPL-MCNC: 139 MG/DL (ref 70–105)
HCT VFR BLD AUTO: 18.2 % (ref 40–54)
HGB BLD-MCNC: 6.3 G/DL (ref 13.5–18)
IMM GRANULOCYTES # BLD AUTO: 0.87 K/UL (ref 0–0.04)
IMM GRANULOCYTES NFR BLD: 6.5 % (ref 0–0.4)
INDIRECT COOMBS: NORMAL
IRON SATN MFR SERPL: 40 % (ref 14–50)
IRON SERPL-MCNC: 39 ΜG/DL (ref 65–175)
LYMPHOCYTES # BLD AUTO: 2.13 K/UL (ref 1–4.8)
LYMPHOCYTES NFR BLD AUTO: 15.8 % (ref 27–41)
LYMPHOCYTES NFR BLD MANUAL: 17 % (ref 27–41)
MCH RBC QN AUTO: 25.5 PG (ref 27–31)
MCHC RBC AUTO-ENTMCNC: 34.6 G/DL (ref 32–36)
MCV RBC AUTO: 73.7 FL (ref 80–96)
METAMYELOCYTES NFR BLD MANUAL: 2 %
MICROCYTES BLD QL SMEAR: ABNORMAL
MONOCYTES # BLD AUTO: 1.01 K/UL (ref 0–0.8)
MONOCYTES NFR BLD AUTO: 7.5 % (ref 2–6)
MONOCYTES NFR BLD MANUAL: 6 % (ref 2–6)
MPC BLD CALC-MCNC: 10.7 FL (ref 9.4–12.4)
NEUTROPHILS # BLD AUTO: 9.16 K/UL (ref 1.8–7.7)
NEUTROPHILS NFR BLD AUTO: 67.9 % (ref 53–65)
NEUTS BAND NFR BLD MANUAL: 3 % (ref 1–5)
NEUTS SEG NFR BLD MANUAL: 70 % (ref 50–62)
NRBC # BLD AUTO: 0.02 X10E3/UL
NRBC, AUTO (.00): 0.1 %
OVALOCYTES BLD QL SMEAR: ABNORMAL
PLATELET # BLD AUTO: 297 K/UL (ref 150–400)
PLATELET MORPHOLOGY: NORMAL
POLYCHROMASIA BLD QL SMEAR: ABNORMAL
POTASSIUM SERPL-SCNC: 3.4 MMOL/L (ref 3.5–5.1)
RBC # BLD AUTO: 2.47 M/UL (ref 4.6–6.2)
RH BLD: NORMAL
SODIUM SERPL-SCNC: 145 MMOL/L (ref 136–145)
TARGETS BLD QL SMEAR: ABNORMAL
TIBC SERPL-MCNC: 98 ΜG/DL (ref 250–450)
UNIT NUMBER: NORMAL
WBC # BLD AUTO: 13.48 K/UL (ref 4.5–11)

## 2022-02-17 PROCEDURE — 99223 1ST HOSP IP/OBS HIGH 75: CPT | Mod: ,,, | Performed by: SURGERY

## 2022-02-17 PROCEDURE — 43246 EGD PLACE GASTROSTOMY TUBE: CPT

## 2022-02-17 PROCEDURE — C9113 INJ PANTOPRAZOLE SODIUM, VIA: HCPCS | Performed by: HOSPITALIST

## 2022-02-17 PROCEDURE — 27000221 HC OXYGEN, UP TO 24 HOURS

## 2022-02-17 PROCEDURE — 99232 PR SUBSEQUENT HOSPITAL CARE,LEVL II: ICD-10-PCS | Mod: ,,, | Performed by: FAMILY MEDICINE

## 2022-02-17 PROCEDURE — 27000284 HC CANNULA NASAL: Performed by: NURSE ANESTHETIST, CERTIFIED REGISTERED

## 2022-02-17 PROCEDURE — 25000003 PHARM REV CODE 250: Performed by: FAMILY MEDICINE

## 2022-02-17 PROCEDURE — 63600175 PHARM REV CODE 636 W HCPCS: Performed by: HOSPITALIST

## 2022-02-17 PROCEDURE — 80048 BASIC METABOLIC PNL TOTAL CA: CPT | Performed by: FAMILY MEDICINE

## 2022-02-17 PROCEDURE — 25000003 PHARM REV CODE 250: Performed by: HOSPITALIST

## 2022-02-17 PROCEDURE — 63600175 PHARM REV CODE 636 W HCPCS: Performed by: FAMILY MEDICINE

## 2022-02-17 PROCEDURE — 25000003 PHARM REV CODE 250: Performed by: NURSE ANESTHETIST, CERTIFIED REGISTERED

## 2022-02-17 PROCEDURE — 82728 ASSAY OF FERRITIN: CPT | Performed by: FAMILY MEDICINE

## 2022-02-17 PROCEDURE — 36415 COLL VENOUS BLD VENIPUNCTURE: CPT | Performed by: FAMILY MEDICINE

## 2022-02-17 PROCEDURE — 86923 COMPATIBILITY TEST ELECTRIC: CPT | Mod: 91 | Performed by: FAMILY MEDICINE

## 2022-02-17 PROCEDURE — 86901 BLOOD TYPING SEROLOGIC RH(D): CPT | Performed by: FAMILY MEDICINE

## 2022-02-17 PROCEDURE — 63600175 PHARM REV CODE 636 W HCPCS: Performed by: NURSE ANESTHETIST, CERTIFIED REGISTERED

## 2022-02-17 PROCEDURE — 99223 PR INITIAL HOSPITAL CARE,LEVL III: ICD-10-PCS | Mod: ,,, | Performed by: SURGERY

## 2022-02-17 PROCEDURE — D9220A PRA ANESTHESIA: Mod: ,,, | Performed by: NURSE ANESTHETIST, CERTIFIED REGISTERED

## 2022-02-17 PROCEDURE — 36430 TRANSFUSION BLD/BLD COMPNT: CPT

## 2022-02-17 PROCEDURE — 85025 COMPLETE CBC W/AUTO DIFF WBC: CPT | Performed by: FAMILY MEDICINE

## 2022-02-17 PROCEDURE — D9220A PRA ANESTHESIA: ICD-10-PCS | Mod: ,,, | Performed by: NURSE ANESTHETIST, CERTIFIED REGISTERED

## 2022-02-17 PROCEDURE — 83540 ASSAY OF IRON: CPT | Performed by: FAMILY MEDICINE

## 2022-02-17 PROCEDURE — P9016 RBC LEUKOCYTES REDUCED: HCPCS | Performed by: FAMILY MEDICINE

## 2022-02-17 PROCEDURE — A6212 FOAM DRG <=16 SQ IN W/BORDER: HCPCS

## 2022-02-17 PROCEDURE — 27000716 HC OXISENSOR PROBE, ANY SIZE: Performed by: NURSE ANESTHETIST, CERTIFIED REGISTERED

## 2022-02-17 PROCEDURE — 82962 GLUCOSE BLOOD TEST: CPT

## 2022-02-17 PROCEDURE — 11000001 HC ACUTE MED/SURG PRIVATE ROOM

## 2022-02-17 PROCEDURE — 99232 SBSQ HOSP IP/OBS MODERATE 35: CPT | Mod: ,,, | Performed by: FAMILY MEDICINE

## 2022-02-17 RX ORDER — ETOMIDATE 2 MG/ML
INJECTION INTRAVENOUS
Status: DISCONTINUED | OUTPATIENT
Start: 2022-02-17 | End: 2022-02-17

## 2022-02-17 RX ORDER — LIDOCAINE HYDROCHLORIDE 20 MG/ML
INJECTION, SOLUTION EPIDURAL; INFILTRATION; INTRACAUDAL; PERINEURAL
Status: DISCONTINUED | OUTPATIENT
Start: 2022-02-17 | End: 2022-02-17

## 2022-02-17 RX ORDER — DEXTROSE MONOHYDRATE, SODIUM CHLORIDE, AND POTASSIUM CHLORIDE 50; 1.49; 4.5 G/1000ML; G/1000ML; G/1000ML
INJECTION, SOLUTION INTRAVENOUS CONTINUOUS
Status: DISCONTINUED | OUTPATIENT
Start: 2022-02-17 | End: 2022-02-21 | Stop reason: HOSPADM

## 2022-02-17 RX ORDER — PROPOFOL 10 MG/ML
VIAL (ML) INTRAVENOUS
Status: DISCONTINUED | OUTPATIENT
Start: 2022-02-17 | End: 2022-02-17

## 2022-02-17 RX ORDER — HYDROCODONE BITARTRATE AND ACETAMINOPHEN 500; 5 MG/1; MG/1
TABLET ORAL
Status: DISCONTINUED | OUTPATIENT
Start: 2022-02-17 | End: 2022-02-21

## 2022-02-17 RX ORDER — SODIUM CHLORIDE 0.9 % (FLUSH) 0.9 %
10 SYRINGE (ML) INJECTION
Status: DISCONTINUED | OUTPATIENT
Start: 2022-02-17 | End: 2022-02-21 | Stop reason: HOSPADM

## 2022-02-17 RX ADMIN — POTASSIUM CHLORIDE, DEXTROSE MONOHYDRATE AND SODIUM CHLORIDE: 150; 5; 450 INJECTION, SOLUTION INTRAVENOUS at 09:02

## 2022-02-17 RX ADMIN — CLINDAMYCIN PHOSPHATE 600 MG: 600 INJECTION, SOLUTION INTRAVENOUS at 06:02

## 2022-02-17 RX ADMIN — SODIUM CHLORIDE: 9 INJECTION, SOLUTION INTRAVENOUS at 10:02

## 2022-02-17 RX ADMIN — PANTOPRAZOLE SODIUM 40 MG: 40 INJECTION, POWDER, FOR SOLUTION INTRAVENOUS at 11:02

## 2022-02-17 RX ADMIN — HEPARIN SODIUM 5000 UNITS: 5000 INJECTION INTRAVENOUS; SUBCUTANEOUS at 08:02

## 2022-02-17 RX ADMIN — CEFEPIME HYDROCHLORIDE 1 G: 1 INJECTION, POWDER, FOR SOLUTION INTRAMUSCULAR; INTRAVENOUS at 12:02

## 2022-02-17 RX ADMIN — CLINDAMYCIN PHOSPHATE 600 MG: 600 INJECTION, SOLUTION INTRAVENOUS at 11:02

## 2022-02-17 RX ADMIN — PROPOFOL 20 MG: 10 INJECTION, EMULSION INTRAVENOUS at 04:02

## 2022-02-17 RX ADMIN — SODIUM CHLORIDE: 9 INJECTION, SOLUTION INTRAVENOUS at 04:02

## 2022-02-17 RX ADMIN — VANCOMYCIN HYDROCHLORIDE 1250 MG: 5 INJECTION, POWDER, LYOPHILIZED, FOR SOLUTION INTRAVENOUS at 10:02

## 2022-02-17 RX ADMIN — LEVOFLOXACIN 500 MG: 5 INJECTION, SOLUTION INTRAVENOUS at 08:02

## 2022-02-17 RX ADMIN — ETOMIDATE 4 MG: 20 INJECTION, SOLUTION INTRAVENOUS at 04:02

## 2022-02-17 RX ADMIN — CEFEPIME HYDROCHLORIDE 1 G: 1 INJECTION, POWDER, FOR SOLUTION INTRAMUSCULAR; INTRAVENOUS at 01:02

## 2022-02-17 RX ADMIN — LIDOCAINE HYDROCHLORIDE 60 MG: 20 INJECTION, SOLUTION INTRAVENOUS at 04:02

## 2022-02-17 RX ADMIN — CLINDAMYCIN PHOSPHATE 600 MG: 600 INJECTION, SOLUTION INTRAVENOUS at 12:02

## 2022-02-17 NOTE — CONSULTS
[facility] - [address] [place] [address]  Gastroenterology  Consult Note    Patient Name: Owen Solis  MRN: [id number]  Admission Date: 2/11/2022  Hospital Length of Stay: 6 days  Code Status: Full Code   Attending Provider: Eliceo Salas MD  Consulting Provider: Eliceo Salas MD  Primary Care Physician: Angelina Jaramillo II, MD  Principal Problem:Aspiration pneumonia of left lower lobe    Inpatient consult to Gastroenterology  Consult performed by: Eliceo Salas MD  Consult ordered by: Yfn Richardson Jr., MD        Subjective:     HPI: See procedure note. The fractured PEG was removed and replaced with a new 20F replacement PEG.    Past Medical History:   Diagnosis Date    Alzheimer disease     Alzheimer's dementia     Arthritis     Complication of gastrostomy 2/17/2022    Hypertension     Metabolic encephalopathy        Past Surgical History:   Procedure Laterality Date    GASTROSTOMY TUBE PLACEMENT         Review of patient's allergies indicates:   Allergen Reactions    Amitriptyline      Family History     Problem Relation (Age of Onset)    COPD Father    Heart disease Mother    Hypertension Mother, Sister        Tobacco Use    Smoking status: Never Smoker    Smokeless tobacco: Never Used   Substance and Sexual Activity    Alcohol use: Not Currently    Drug use: Never    Sexual activity: Not Currently     Review of Systems  Objective:     Vital Signs (Most Recent):  Temp: 97 °F (36.1 °C) (02/17/22 1625)  Pulse: (!) 111 (02/17/22 1653)  Resp: (!) 28 (02/17/22 1653)  BP: 137/83 (02/17/22 1653)  SpO2: 100 % (02/17/22 1653) Vital Signs (24h Range):  Temp:  [97 °F (36.1 °C)-100.3 °F (37.9 °C)] 97 °F (36.1 °C)  Pulse:  [108-116] 111  Resp:  [17-35] 28  SpO2:  [97 %-100 %] 100 %  BP: (110-137)/(65-88) 137/83     Weight: 69 kg (152 lb 1.9 oz) (02/17/22 0455)  Body mass index is 23.13 kg/m².      Intake/Output Summary (Last 24 hours) at 2/17/2022 1745  Last data filed at 2/17/2022 1625  Gross  per 24 hour   Intake 100 ml   Output 3150 ml   Net -3050 ml       Lines/Drains/Airways     Drain                 Gastrostomy/Enterostomy 09/20/21 1214 Percutaneous endoscopic gastrostomy (PEG) LUQ feeding 150 days         Urethral Catheter 02/14/22 1600 Coude 3 days          Peripheral Intravenous Line                 Peripheral IV - Single Lumen 02/17/22 1616 22 G Right Forearm <1 day                Physical Exam    Significant Labs:  CBC:   Recent Labs   Lab 02/17/22  0453   WBC 13.48*   HGB 6.3*   HCT 18.2*        CMP:   Recent Labs   Lab 02/17/22  0453   *   CALCIUM 9.6      K 3.4*   CO2 25   *   BUN 41*   CREATININE 1.22       Significant Imaging:  Imaging results within the past 24 hours have been reviewed.    Assessment/Plan:     Active Diagnoses:    Diagnosis Date Noted POA    PRINCIPAL PROBLEM:  Aspiration pneumonia of left lower lobe [J69.0] 09/17/2021 Yes    Complication of gastrostomy tube [K94.20] 02/17/2022 No    Acute superficial gastritis without hemorrhage [K29.00]  No    Dysphagia [R13.10] 02/12/2022 Yes    Sacral wound [S31.000A] 02/12/2022 Yes    EDITH (acute kidney injury) [N17.9]  Yes      Problems Resolved During this Admission:    Diagnosis Date Noted Date Resolved POA    Hypernatremia [E87.0] 02/12/2022 02/16/2022 Yes       Imp: complication of PEG, gastritis, oral dysphagia  Rec: resume PEG feedings, flushes and PEG site care.    Thank you for your consult. I will sign off. Please contact us if you have any additional questions.    Eliceo Salas MD  Gastroenterology  [facility] - [address] [place] [address]

## 2022-02-17 NOTE — ANESTHESIA POSTPROCEDURE EVALUATION
Anesthesia Post Evaluation    Patient: Owen Solis    Procedure(s) Performed: * No procedures listed *    Final Anesthesia Type: general      Patient location during evaluation: GI PACU  Patient participation: Yes- Able to Participate  Level of consciousness: awake and alert and responds to stimulation (returned to baseline)  Post-procedure vital signs: reviewed and stable  Pain management: adequate  Airway patency: patent    PONV status at discharge: No PONV  Anesthetic complications: no      Cardiovascular status: blood pressure returned to baseline and hemodynamically stable  Respiratory status: spontaneous ventilation  Hydration status: euvolemic  Follow-up not needed.  Comments: Pt voices appreciation for care          Vitals Value Taken Time   /84 02/17/22 1656   Temp 97 F 02/17/22 1659   Pulse 109 02/17/22 1659   Resp 30 02/17/22 1658   SpO2 100 % 02/17/22 1659   Vitals shown include unvalidated device data.      Event Time   Out of Recovery 02/17/2022 16:55:18         Pain/Ramesh Score: Ramesh Score: 8 (2/17/2022  4:29 PM)

## 2022-02-17 NOTE — ANESTHESIA PREPROCEDURE EVALUATION
02/17/2022  Owen Solis is a 61 y.o., male.  Past Medical History:   Diagnosis Date    Alzheimer disease     Alzheimer's dementia     Arthritis     Hypertension     Metabolic encephalopathy        Past Surgical History:   Procedure Laterality Date    GASTROSTOMY TUBE PLACEMENT         Family History   Problem Relation Age of Onset    Hypertension Mother     Heart disease Mother     COPD Father     Hypertension Sister        Social History     Socioeconomic History    Marital status: Single   Tobacco Use    Smoking status: Never Smoker    Smokeless tobacco: Never Used   Substance and Sexual Activity    Alcohol use: Not Currently    Drug use: Never    Sexual activity: Not Currently       Current Facility-Administered Medications   Medication Dose Route Frequency Provider Last Rate Last Admin    acetaminophen suppository 650 mg  650 mg Rectal Q6H PRN Tracey Lofton MD        albuterol nebulizer solution 2.5 mg  2.5 mg Nebulization Q4H PRN Kia Villatoro MD        ceFEPIme (MAXIPIME) 1 g in dextrose 5 % in water (D5W) 5 % 50 mL IVPB (MB+)  1 g Intravenous Q12H Tracey Lofton  mL/hr at 02/17/22 1257 1 g at 02/17/22 1257    clindamycin in D5W 600 mg/50 mL IVPB 600 mg  600 mg Intravenous Q6H Tracey Lofton MD   Stopped at 02/17/22 1218    dextrose 5 % and 0.45 % NaCl with KCl 20 mEq infusion   Intravenous Continuous Yfn Richardson Jr.,  mL/hr at 02/17/22 0912 New Bag at 02/17/22 0912    heparin (porcine) injection 5,000 Units  5,000 Units Subcutaneous Q12H Kia Villatoro MD   5,000 Units at 02/17/22 0859    levoFLOXacin 500 mg/100 mL IVPB 500 mg  500 mg Intravenous Q24H Yfn Richardson Jr., MD   Stopped at 02/16/22 2054    pantoprazole injection 40 mg  40 mg Intravenous Daily Tracey Lofton MD   40 mg at 02/17/22 1126    sodium chloride 0.9% flush 10 mL   10 mL Intravenous Q12H PRN Kia Villatoro MD        vancomycin (VANCOCIN) 1,250 mg in dextrose 5 % 250 mL IVPB  1,250 mg Intravenous Q36H Yfn Richardson Jr., MD           Review of patient's allergies indicates:   Allergen Reactions    Amitriptyline      Anesthesia Evaluation    I have reviewed the Patient Summary Reports.    I have reviewed the Nursing Notes. I have reviewed the NPO Status.   I have reviewed the Medications.     Review of Systems  Anesthesia Hx:  No problems with previous Anesthesia  Denies Family Hx of Anesthesia complications.   Denies Personal Hx of Anesthesia complications.   Hematology/Oncology:     Oncology Normal     EENT/Dental:EENT/Dental Normal   Cardiovascular:   Hypertension hyperlipidemia    Pulmonary:   Pneumonia    Renal/:   Chronic Renal Disease    Hepatic/GI:   GERD    Musculoskeletal:   Arthritis     Neurological:  Neurology Normal    Endocrine:   Diabetes, type 2    Dermatological:  Skin Normal    Psych:   Psychiatric History          Physical Exam  General:  Well nourished       Chest/Lungs:  Chest/Lungs Findings: Normal Respiratory Rate     Heart/Vascular:  Heart Findings: Rate: Normal             Anesthesia Plan  Type of Anesthesia, risks & benefits discussed:  Anesthesia Type:  MAC, general    Patient's Preference:   Plan Factors:          Intra-op Monitoring Plan: standard ASA monitors  Intra-op Monitoring Plan Comments:   Post Op Pain Control Plan: per primary service following discharge from PACU  Post Op Pain Control Plan Comments:     Induction:   IV  Beta Blocker:  Patient is not currently on a Beta-Blocker (No further documentation required).       Informed Consent: Patient understands risks and agrees with Anesthesia plan.  Questions answered. Anesthesia consent signed with patient.  ASA Score: 3     Day of Surgery Review of History & Physical: I have interviewed and examined the patient. I have reviewed the patient's H&P dated:  There are no significant  changes.          Ready For Surgery From Anesthesia Perspective.

## 2022-02-17 NOTE — H&P
[address]  Gastroenterology  H&P    Patient Name: Owen Solis  MRN: [id number]  Admission Date: 2/11/2022  Code Status: Full Code    Attending Provider: Eliceo Salas MD  Primary Care Physician: Angelina Jaramillo II, MD  Principal Problem:Aspiration pneumonia of left lower lobe    Subjective:     History of Present Illness: Pt has a fractured PEG, for PEG change.    Past Medical History:   Diagnosis Date    Alzheimer disease     Alzheimer's dementia     Arthritis     Hypertension     Metabolic encephalopathy        Past Surgical History:   Procedure Laterality Date    GASTROSTOMY TUBE PLACEMENT         Review of patient's allergies indicates:   Allergen Reactions    Amitriptyline      Family History     Problem Relation (Age of Onset)    COPD Father    Heart disease Mother    Hypertension Mother, Sister        Tobacco Use    Smoking status: Never Smoker    Smokeless tobacco: Never Used   Substance and Sexual Activity    Alcohol use: Not Currently    Drug use: Never    Sexual activity: Not Currently     Review of Systems   Unable to perform ROS: Patient nonverbal     Objective:     Vital Signs (Most Recent):  Temp: 98.7 °F (37.1 °C) (02/17/22 1530)  Pulse: (!) 115 (02/17/22 1530)  Resp: (!) 24 (02/17/22 1530)  BP: 128/88 (02/17/22 1530)  SpO2: 100 % (02/17/22 1530) Vital Signs (24h Range):  Temp:  [98.3 °F (36.8 °C)-100.3 °F (37.9 °C)] 98.7 °F (37.1 °C)  Pulse:  [111-116] 115  Resp:  [20-24] 24  SpO2:  [97 %-100 %] 100 %  BP: (110-128)/(65-88) 128/88     Weight: 69 kg (152 lb 1.9 oz) (02/17/22 0455)  Body mass index is 23.13 kg/m².      Intake/Output Summary (Last 24 hours) at 2/17/2022 1610  Last data filed at 2/17/2022 1415  Gross per 24 hour   Intake --   Output 3150 ml   Net -3150 ml       Lines/Drains/Airways     Drain                 Gastrostomy/Enterostomy 09/20/21 1214 Percutaneous endoscopic gastrostomy (PEG) LUQ feeding 150 days         Urethral Catheter  02/14/22 1600 Coude 3 days          Peripheral Intravenous Line                 Peripheral IV - Single Lumen 02/15/22 1030 22 G Anterior;Left Forearm 2 days                Physical Exam  Vitals reviewed.   Constitutional:       General: He is not in acute distress.     Appearance: Normal appearance. He is well-developed. He is ill-appearing.   HENT:      Head: Normocephalic and atraumatic.      Nose: Nose normal.   Eyes:      Pupils: Pupils are equal, round, and reactive to light.   Cardiovascular:      Rate and Rhythm: Regular rhythm. Tachycardia present.   Pulmonary:      Effort: Pulmonary effort is normal.      Breath sounds: Normal breath sounds. No wheezing.   Abdominal:      General: Abdomen is flat. Bowel sounds are normal. There is no distension.      Palpations: Abdomen is soft.      Tenderness: There is no abdominal tenderness. There is no guarding.   Skin:     General: Skin is warm and dry.      Coloration: Skin is not jaundiced.   Neurological:      Mental Status: He is alert.   Psychiatric:         Attention and Perception: Attention normal.         Mood and Affect: Affect normal.         Speech: Speech normal.         Behavior: Behavior is cooperative.         Significant Labs:  CBC:   Recent Labs   Lab 02/17/22  0453   WBC 13.48*   HGB 6.3*   HCT 18.2*        CMP:   Recent Labs   Lab 02/17/22  0453   *   CALCIUM 9.6      K 3.4*   CO2 25   *   BUN 41*   CREATININE 1.22       Significant Imaging:  Imaging results within the past 24 hours have been reviewed.    Assessment/Plan:     Active Diagnoses:    Diagnosis Date Noted POA    PRINCIPAL PROBLEM:  Aspiration pneumonia of left lower lobe [J69.0] 09/17/2021 Yes    Dysphagia [R13.10] 02/12/2022 Yes    Sacral wound [S31.000A] 02/12/2022 Yes    EDITH (acute kidney injury) [N17.9]  Yes      Problems Resolved During this Admission:    Diagnosis Date Noted Date Resolved POA    Hypernatremia [E87.0] 02/12/2022 02/16/2022 Yes        Imp: complication of PEG  Plan : egd with PEG    Eliceo Salas MD  Gastroenterology  [facility] - [address] [place] [address]

## 2022-02-17 NOTE — PLAN OF CARE
Problem: Adult Inpatient Plan of Care  Goal: Plan of Care Review  Outcome: Ongoing, Progressing  Flowsheets (Taken 2/16/2022 2254)  Plan of Care Reviewed With: (Noone available for careplan review) other (see comments)  Goal: Patient-Specific Goal (Individualized)  Outcome: Ongoing, Progressing  Flowsheets (Taken 2/16/2022 2254)  Anxieties, Fears or Concerns: N/A  Individualized Care Needs: Total care patient  Patient-Specific Goals (Include Timeframe): Resolve infection, maintain airway, prevent aspiration d/t continuous peg feedings  Goal: Absence of Hospital-Acquired Illness or Injury  Outcome: Ongoing, Progressing  Goal: Optimal Comfort and Wellbeing  Outcome: Ongoing, Progressing     Problem: Fall Injury Risk  Goal: Absence of Fall and Fall-Related Injury  Outcome: Ongoing, Progressing     Problem: Skin Injury Risk Increased  Goal: Skin Health and Integrity  Outcome: Ongoing, Progressing     Problem: Fluid and Electrolyte Imbalance (Acute Kidney Injury/Impairment)  Goal: Fluid and Electrolyte Balance  Outcome: Ongoing, Progressing     Problem: Infection (Pneumonia)  Goal: Resolution of Infection Signs and Symptoms  Outcome: Ongoing, Progressing     Problem: Respiratory Compromise (Pneumonia)  Goal: Effective Oxygenation and Ventilation  Outcome: Ongoing, Progressing     Problem: Impaired Wound Healing  Goal: Optimal Wound Healing  Outcome: Ongoing, Progressing   POC ongoing without complication.

## 2022-02-17 NOTE — TRANSFER OF CARE
"Anesthesia Transfer of Care Note    Patient: Owen Solis    Procedure(s) Performed: * No procedures listed *    Patient location: GI    Anesthesia Type: general    Transport from OR: Transported from OR on room air with adequate spontaneous ventilation. Continuous ECG monitoring in transport. Continuous SpO2 monitoring in transport    Post pain: adequate analgesia    Post assessment: no apparent anesthetic complications    Post vital signs: stable    Level of consciousness: sedated and responds to stimulation    Nausea/Vomiting: no nausea/vomiting    Complications: none    Transfer of care protocol was followedComments: Good SV continue, NAD, VSS, RTRN      Last vitals:   Visit Vitals  /73 (BP Location: Left arm, Patient Position: Lying)   Pulse (!) 111   Temp 36.1 °C (97 °F) (Skin)   Resp 17   Ht 5' 8" (1.727 m)   Wt 69 kg (152 lb 1.9 oz)   SpO2 100%   BMI 23.13 kg/m²     "

## 2022-02-18 LAB
BUN SERPL-MCNC: 35 MG/DL (ref 7–18)
BUN/CREAT SERPL: 31 (ref 6–20)
CREAT SERPL-MCNC: 1.12 MG/DL (ref 0.7–1.3)
HCT VFR BLD AUTO: 22.8 % (ref 40–54)
HGB BLD-MCNC: 7.6 G/DL (ref 13.5–18)

## 2022-02-18 PROCEDURE — 27000603 HC FOOT DROP INFLATABLE

## 2022-02-18 PROCEDURE — 85014 HEMATOCRIT: CPT | Performed by: FAMILY MEDICINE

## 2022-02-18 PROCEDURE — C9113 INJ PANTOPRAZOLE SODIUM, VIA: HCPCS | Performed by: HOSPITALIST

## 2022-02-18 PROCEDURE — 99232 PR SUBSEQUENT HOSPITAL CARE,LEVL II: ICD-10-PCS | Mod: ,,, | Performed by: FAMILY MEDICINE

## 2022-02-18 PROCEDURE — 63600175 PHARM REV CODE 636 W HCPCS: Performed by: FAMILY MEDICINE

## 2022-02-18 PROCEDURE — 27000620

## 2022-02-18 PROCEDURE — 63600175 PHARM REV CODE 636 W HCPCS: Performed by: HOSPITALIST

## 2022-02-18 PROCEDURE — 25000003 PHARM REV CODE 250: Performed by: HOSPITALIST

## 2022-02-18 PROCEDURE — 94761 N-INVAS EAR/PLS OXIMETRY MLT: CPT

## 2022-02-18 PROCEDURE — 11000001 HC ACUTE MED/SURG PRIVATE ROOM

## 2022-02-18 PROCEDURE — 36415 COLL VENOUS BLD VENIPUNCTURE: CPT | Performed by: FAMILY MEDICINE

## 2022-02-18 PROCEDURE — A6212 FOAM DRG <=16 SQ IN W/BORDER: HCPCS

## 2022-02-18 PROCEDURE — 96372 THER/PROPH/DIAG INJ SC/IM: CPT

## 2022-02-18 PROCEDURE — 84520 ASSAY OF UREA NITROGEN: CPT | Performed by: FAMILY MEDICINE

## 2022-02-18 PROCEDURE — 82565 ASSAY OF CREATININE: CPT | Performed by: FAMILY MEDICINE

## 2022-02-18 PROCEDURE — 27000221 HC OXYGEN, UP TO 24 HOURS

## 2022-02-18 PROCEDURE — 99232 SBSQ HOSP IP/OBS MODERATE 35: CPT | Mod: ,,, | Performed by: FAMILY MEDICINE

## 2022-02-18 PROCEDURE — 36430 TRANSFUSION BLD/BLD COMPNT: CPT

## 2022-02-18 RX ADMIN — PANTOPRAZOLE SODIUM 40 MG: 40 INJECTION, POWDER, FOR SOLUTION INTRAVENOUS at 09:02

## 2022-02-18 RX ADMIN — HEPARIN SODIUM 5000 UNITS: 5000 INJECTION INTRAVENOUS; SUBCUTANEOUS at 09:02

## 2022-02-18 RX ADMIN — LEVOFLOXACIN 500 MG: 5 INJECTION, SOLUTION INTRAVENOUS at 09:02

## 2022-02-18 RX ADMIN — CEFEPIME HYDROCHLORIDE 1 G: 1 INJECTION, POWDER, FOR SOLUTION INTRAMUSCULAR; INTRAVENOUS at 01:02

## 2022-02-18 RX ADMIN — CLINDAMYCIN PHOSPHATE 600 MG: 600 INJECTION, SOLUTION INTRAVENOUS at 12:02

## 2022-02-18 RX ADMIN — CLINDAMYCIN PHOSPHATE 600 MG: 600 INJECTION, SOLUTION INTRAVENOUS at 05:02

## 2022-02-18 RX ADMIN — POTASSIUM CHLORIDE, DEXTROSE MONOHYDRATE AND SODIUM CHLORIDE: 150; 5; 450 INJECTION, SOLUTION INTRAVENOUS at 05:02

## 2022-02-18 RX ADMIN — CEFEPIME HYDROCHLORIDE 1 G: 1 INJECTION, POWDER, FOR SOLUTION INTRAMUSCULAR; INTRAVENOUS at 12:02

## 2022-02-18 RX ADMIN — POTASSIUM CHLORIDE, DEXTROSE MONOHYDRATE AND SODIUM CHLORIDE: 150; 5; 450 INJECTION, SOLUTION INTRAVENOUS at 03:02

## 2022-02-18 NOTE — SUBJECTIVE & OBJECTIVE
Interval History: Non verbal.     Review of Systems   Unable to perform ROS: Patient nonverbal     Objective:     Vital Signs (Most Recent):  Temp: 97 °F (36.1 °C) (02/17/22 1625)  Pulse: (!) 111 (02/17/22 1653)  Resp: (!) 28 (02/17/22 1653)  BP: 137/83 (02/17/22 1653)  SpO2: 100 % (02/17/22 1653) Vital Signs (24h Range):  Temp:  [97 °F (36.1 °C)-100.3 °F (37.9 °C)] 97 °F (36.1 °C)  Pulse:  [108-116] 111  Resp:  [17-35] 28  SpO2:  [97 %-100 %] 100 %  BP: (110-137)/(65-88) 137/83     Weight: 69 kg (152 lb 1.9 oz)  Body mass index is 23.13 kg/m².    Intake/Output Summary (Last 24 hours) at 2/17/2022 1912  Last data filed at 2/17/2022 1625  Gross per 24 hour   Intake 100 ml   Output 2350 ml   Net -2250 ml      Physical Exam  Vitals reviewed.   Constitutional:       General: He is not in acute distress.     Appearance: He is not ill-appearing or toxic-appearing.   HENT:      Head: Normocephalic.   Cardiovascular:      Rate and Rhythm: Normal rate and regular rhythm.   Pulmonary:      Effort: Pulmonary effort is normal.      Breath sounds: Rales present. No wheezing or rhonchi.   Abdominal:      General: Bowel sounds are normal. There is no distension.      Palpations: Abdomen is soft.      Tenderness: There is no guarding.      Comments: Flat  Peg intact   Musculoskeletal:         General: Deformity present.      Comments: contractures   Skin:     General: Skin is warm and dry.   Neurological:      Mental Status: Mental status is at baseline.         Significant Labs:   All pertinent labs within the past 24 hours have been reviewed.  BMP:   Recent Labs   Lab 02/17/22 0453   *      K 3.4*   *   CO2 25   BUN 41*   CREATININE 1.22   CALCIUM 9.6     CBC:   Recent Labs   Lab 02/17/22 0453   WBC 13.48*   HGB 6.3*   HCT 18.2*          Significant Imaging: I have reviewed all pertinent imaging results/findings within the past 24 hours.

## 2022-02-18 NOTE — ASSESSMENT & PLAN NOTE
Iron studies reviewed. 40% saturation. Iron 39 with TIBC of 98. Less likely RAMO. Ferritin elevated, could represent adequate iron  stores and/or acute phase reactant from his illness.  Monitor for bleeding.  Not sure that fall in H/H is not somewhat dilutional as he was quite dry when he got here. On Protonix.   Alpha thal in differential.

## 2022-02-18 NOTE — ASSESSMENT & PLAN NOTE
Large L lobar PNA on CXR. Patient hypoxic requiring supplemental O2. Febrile to 102.9 with leukocytosis of 32 on admission. Lactic acid 6.9 -> 5.8. Concern for aspiration as food particles were suctioned out on arrival.      - Abx: Vancomycin, cefepime, clindamycin, levaquin  - Blood cultures no growth   - Supplemental oxygen PRN  - NPO   - tube feeding resumed.   - Improving slowly. Plan 7 days abx (Through tonight)

## 2022-02-18 NOTE — PROGRESS NOTES
[address] Medicine  Progress Note    Patient Name: Owen Solis  MRN: [id number]  Patient Class: IP- Inpatient   Admission Date: 2/11/2022  Length of Stay: 7 days  Attending Physician: Yfn Richardson Jr., MD  Primary Care Provider: Angelina Jaramillo II, MD        Subjective:     Principal Problem:Aspiration pneumonia of left lower lobe        HPI:    Mr. Solis is a 62yo M who presents from [facility] [place] with shortness of breath and hypoxia.     The patient was recently discharged from [facility] on 2/7 after being treated for UTI, HCAP believed to be secondary to aspiration, and dehydration. He presents similarly today. Patient has had failure to thrive post-COVID and currently has a PEG for tube feedings. During last admission, patient was suctioned and food remnants were found. Upon suctioning on arrival today they were found again. Patient has known dysphagia only receiving PEG feeds and was unable to pass a swallow evaluation during the last hospitalization. Patient turns his head from me during exam but does not otherwise respond. Patient has a PMH of Alzheimer's dementia, COVID-19, HTN.     On arrival, patient was febrile to 102.9 and tachycardic to 140. EKG with sinus tachycardia. Leukocytosis of 32, Na 146, BUN/Cr 36/2.19 with GFR 32. CXR with a large L sided PNA. Lactic acid initially 6.9 and repeat 5.8. Patient has received 2L NS bolus and zosyn in the ED. Will admit patient to the hospitalist service for further IV antibiotics and IVF resuscitation.       Overview/Hospital Course:  No notes on file    Interval History: Patient got 1 unit PRBC last night.     Review of Systems   Unable to perform ROS: Patient nonverbal     Objective:     Vital Signs (Most Recent):  Temp: 98.7 °F (37.1 °C) (02/18/22 1000)  Pulse: 107 (02/18/22 1000)  Resp: (!) 22 (02/18/22 1000)  BP: 136/78 (02/18/22 1000)  SpO2: 100 % (02/18/22 1000) Vital Signs (24h Range):  Temp:  [97 °F (36.1 °C)-100 °F  (37.8 °C)] 98.7 °F (37.1 °C)  Pulse:  [107-115] 107  Resp:  [17-35] 22  SpO2:  [97 %-100 %] 100 %  BP: (112-137)/(56-88) 136/78     Weight: 69 kg (152 lb 1.9 oz)  Body mass index is 23.13 kg/m².    Intake/Output Summary (Last 24 hours) at 2/18/2022 1451  Last data filed at 2/18/2022 1000  Gross per 24 hour   Intake 420 ml   Output 1700 ml   Net -1280 ml      Physical Exam  Vitals reviewed.   HENT:      Nose: Nose normal.   Eyes:      General: No scleral icterus.  Cardiovascular:      Rate and Rhythm: Regular rhythm. Tachycardia present.      Heart sounds: Normal heart sounds.   Pulmonary:      Effort: Pulmonary effort is normal. No respiratory distress.      Breath sounds: Rhonchi present.   Abdominal:      General: Abdomen is flat. There is no distension.      Palpations: Abdomen is soft.      Tenderness: There is no abdominal tenderness.   Skin:     General: Skin is warm and dry.   Neurological:      Mental Status: He is alert. Mental status is at baseline.         Significant Labs:   All pertinent labs within the past 24 hours have been reviewed.  CBC:   Recent Labs   Lab 02/17/22  0453 02/18/22  0949   WBC 13.48*  --    HGB 6.3* 7.6*   HCT 18.2* 22.8*     --        Significant Imaging: I have reviewed all pertinent imaging results/findings within the past 24 hours.      Assessment/Plan:      * Aspiration pneumonia of left lower lobe  Large L lobar PNA on CXR. Patient hypoxic requiring supplemental O2. Febrile to 102.9 with leukocytosis of 32 on admission. Lactic acid 6.9 -> 5.8. Concern for aspiration as food particles were suctioned out on arrival.      - Abx: Vancomycin, cefepime, clindamycin, levaquin  - Blood cultures no growth   - Supplemental oxygen PRN  - NPO   - tube feeding resumed.   - Improving slowly. Plan 7 days abx (Through tonight)     EDITH (acute kidney injury)  EDITH likely secondary to dehydration. Improved with fluids    Sacral wound  - Wound care consulted for this unstageable wound.  Bedside debridement performed by surgery     Dysphagia  - Patient with known dysphagia and continued concern for aspiration PNA   - PEG in place with feedings going. Increased free water flushes.   - NPO     Microcytic anemia    Iron studies reviewed. 40% saturation. Iron 39 with TIBC of 98. Less likely RAMO. Ferritin elevated, could represent adequate iron  stores and/or acute phase reactant from his illness.  Monitor for bleeding.  Not sure that fall in H/H is not somewhat dilutional as he was quite dry when he got here. On Protonix.   Alpha thal in differential.     Complication of gastrostomy tube    G- tube began leaking after my visit yesterday, replaced by Dr. Salas.        VTE Risk Mitigation (From admission, onward)         Ordered     heparin (porcine) injection 5,000 Units  Every 12 hours         02/12/22 0203     IP VTE LOW RISK PATIENT  Once         02/12/22 0203     Place sequential compression device  Until discontinued         02/12/22 0203                Discharge Planning   ESTEFANY:      Code Status: Full Code   Is the patient medically ready for discharge?:     Reason for patient still in hospital (select all that apply): Treatment  Discharge Plan A: Return to nursing home                  Yfn Richardson Jr, MD  Department of Hospital Medicine   [address]

## 2022-02-18 NOTE — PROGRESS NOTES
[address] Medicine  Progress Note    Patient Name: Owen Solis  MRN: [id number]  Patient Class: IP- Inpatient   Admission Date: 2/11/2022  Length of Stay: 7 days  Attending Physician: Yfn Richardson Jr., MD  Primary Care Provider: Angelina Jaramillo II, MD        Subjective:     Principal Problem:Aspiration pneumonia of left lower lobe        HPI:    Mr. Solis is a 62yo M who presents from [facility] [place] with shortness of breath and hypoxia.     The patient was recently discharged from [facility] on 2/7 after being treated for UTI, HCAP believed to be secondary to aspiration, and dehydration. He presents similarly today. Patient has had failure to thrive post-COVID and currently has a PEG for tube feedings. During last admission, patient was suctioned and food remnants were found. Upon suctioning on arrival today they were found again. Patient has known dysphagia only receiving PEG feeds and was unable to pass a swallow evaluation during the last hospitalization. Patient turns his head from me during exam but does not otherwise respond. Patient has a PMH of Alzheimer's dementia, COVID-19, HTN.     On arrival, patient was febrile to 102.9 and tachycardic to 140. EKG with sinus tachycardia. Leukocytosis of 32, Na 146, BUN/Cr 36/2.19 with GFR 32. CXR with a large L sided PNA. Lactic acid initially 6.9 and repeat 5.8. Patient has received 2L NS bolus and zosyn in the ED. Will admit patient to the hospitalist service for further IV antibiotics and IVF resuscitation.       Overview/Hospital Course:  No notes on file    Interval History: Non verbal.     Review of Systems   Unable to perform ROS: Patient nonverbal     Objective:     Vital Signs (Most Recent):  Temp: 97 °F (36.1 °C) (02/17/22 1625)  Pulse: (!) 111 (02/17/22 1653)  Resp: (!) 28 (02/17/22 1653)  BP: 137/83 (02/17/22 1653)  SpO2: 100 % (02/17/22 1653) Vital Signs (24h Range):  Temp:  [97 °F (36.1 °C)-100.3 °F (37.9 °C)] 97 °F  (36.1 °C)  Pulse:  [108-116] 111  Resp:  [17-35] 28  SpO2:  [97 %-100 %] 100 %  BP: (110-137)/(65-88) 137/83     Weight: 69 kg (152 lb 1.9 oz)  Body mass index is 23.13 kg/m².    Intake/Output Summary (Last 24 hours) at 2/17/2022 1912  Last data filed at 2/17/2022 1625  Gross per 24 hour   Intake 100 ml   Output 2350 ml   Net -2250 ml      Physical Exam  Vitals reviewed.   Constitutional:       General: He is not in acute distress.     Appearance: He is not ill-appearing or toxic-appearing.   HENT:      Head: Normocephalic.   Cardiovascular:      Rate and Rhythm: Normal rate and regular rhythm.   Pulmonary:      Effort: Pulmonary effort is normal.      Breath sounds: Rales present. No wheezing or rhonchi.   Abdominal:      General: Bowel sounds are normal. There is no distension.      Palpations: Abdomen is soft.      Tenderness: There is no guarding.      Comments: Flat  Peg intact   Musculoskeletal:         General: Deformity present.      Comments: contractures   Skin:     General: Skin is warm and dry.   Neurological:      Mental Status: Mental status is at baseline.         Significant Labs:   All pertinent labs within the past 24 hours have been reviewed.  BMP:   Recent Labs   Lab 02/17/22  0453   *      K 3.4*   *   CO2 25   BUN 41*   CREATININE 1.22   CALCIUM 9.6     CBC:   Recent Labs   Lab 02/17/22  0453   WBC 13.48*   HGB 6.3*   HCT 18.2*          Significant Imaging: I have reviewed all pertinent imaging results/findings within the past 24 hours.      Assessment/Plan:      * Aspiration pneumonia of left lower lobe  Large L lobar PNA on CXR. Patient hypoxic requiring supplemental O2. Febrile to 102.9 with leukocytosis of 32 on admission. Lactic acid 6.9 -> 5.8. Concern for aspiration as food particles were suctioned out on arrival.      - Abx: Vancomycin, cefepime, clindamycin, levaquin  - Blood cultures no growth   - Supplemental oxygen PRN  - NPO   - tube feeding resumed.   -  Improving slowly. Plan 7 days abx.    EDITH (acute kidney injury)  EDITH likely secondary to dehydration. Improved with fluids. Na improved overnight with IV fluids. Will d/c IVF and increase flushes to 250.q 4.     Sacral wound  - Wound care consulted for this unstageable wound. Bedside debridement performed by surgery     Dysphagia  - Patient with known dysphagia and continued concern for aspiration PNA   - PEG in place with feedings going. Increasing free water flushes.  - NPO     Microcytic anemia    Iron studies reviewed. 40% saturation. Less likely RAMO.  Transfusion. Monitor for bleeding.  Not sure that fall in H/H is not somewhat dilutional as he was quite dry when he got here. On Protonix.    Complication of gastrostomy tube    G- tube began leaking after my visit. Replaced by GI, thanks.       VTE Risk Mitigation (From admission, onward)         Ordered     heparin (porcine) injection 5,000 Units  Every 12 hours         02/12/22 0203     IP VTE LOW RISK PATIENT  Once         02/12/22 0203     Place sequential compression device  Until discontinued         02/12/22 0203                Discharge Planning   ESTEFANY:      Code Status: Full Code   Is the patient medically ready for discharge?:     Reason for patient still in hospital (select all that apply): Treatment  Discharge Plan A: Return to nursing home                  Yfn Richardson Jr, MD  Department of Hospital Medicine   [address]

## 2022-02-18 NOTE — ASSESSMENT & PLAN NOTE
Iron studies reviewed. 40% saturation. Less likely RAMO.  Transfusion. Monitor for bleeding.  Not sure that fall in H/H is not somewhat dilutional as he was quite dry when he got here. On Protonix.

## 2022-02-18 NOTE — PLAN OF CARE
Per Dr. Richardson pt should be ready to return to NH Mon or Tues. SS will call NH first thing Mon morning to inform them that pt is d/c ready. Following.

## 2022-02-18 NOTE — PLAN OF CARE
Problem: Adult Inpatient Plan of Care  Goal: Plan of Care Review  Outcome: Ongoing, Progressing  Goal: Patient-Specific Goal (Individualized)  Outcome: Ongoing, Progressing  Goal: Absence of Hospital-Acquired Illness or Injury  Outcome: Ongoing, Progressing  Goal: Optimal Comfort and Wellbeing  Outcome: Ongoing, Progressing     Problem: Fall Injury Risk  Goal: Absence of Fall and Fall-Related Injury  Outcome: Ongoing, Progressing     Problem: Skin Injury Risk Increased  Goal: Skin Health and Integrity  Outcome: Ongoing, Progressing     Problem: Fluid and Electrolyte Imbalance (Acute Kidney Injury/Impairment)  Goal: Fluid and Electrolyte Balance  Outcome: Ongoing, Progressing     Problem: Renal Function Impairment (Acute Kidney Injury/Impairment)  Goal: Effective Renal Function  Outcome: Ongoing, Progressing     Problem: Fluid Imbalance (Pneumonia)  Goal: Fluid Balance  Outcome: Ongoing, Progressing     Problem: Infection (Pneumonia)  Goal: Resolution of Infection Signs and Symptoms  Outcome: Ongoing, Progressing     Problem: Impaired Wound Healing  Goal: Optimal Wound Healing  Outcome: Ongoing, Progressing   POC ongoing

## 2022-02-18 NOTE — NURSING
Pt Heart Monitor has been returned back to the floor after coming from gi lab yesterday and was unaware of it gone from yesterday.

## 2022-02-18 NOTE — ASSESSMENT & PLAN NOTE
- Patient with known dysphagia and continued concern for aspiration PNA   - PEG in place with feedings going. Increased free water flushes.   - NPO

## 2022-02-18 NOTE — SUBJECTIVE & OBJECTIVE
Interval History: Patient got 1 unit PRBC last night.     Review of Systems   Unable to perform ROS: Patient nonverbal     Objective:     Vital Signs (Most Recent):  Temp: 98.7 °F (37.1 °C) (02/18/22 1000)  Pulse: 107 (02/18/22 1000)  Resp: (!) 22 (02/18/22 1000)  BP: 136/78 (02/18/22 1000)  SpO2: 100 % (02/18/22 1000) Vital Signs (24h Range):  Temp:  [97 °F (36.1 °C)-100 °F (37.8 °C)] 98.7 °F (37.1 °C)  Pulse:  [107-115] 107  Resp:  [17-35] 22  SpO2:  [97 %-100 %] 100 %  BP: (112-137)/(56-88) 136/78     Weight: 69 kg (152 lb 1.9 oz)  Body mass index is 23.13 kg/m².    Intake/Output Summary (Last 24 hours) at 2/18/2022 1451  Last data filed at 2/18/2022 1000  Gross per 24 hour   Intake 420 ml   Output 1700 ml   Net -1280 ml      Physical Exam  Vitals reviewed.   HENT:      Nose: Nose normal.   Eyes:      General: No scleral icterus.  Cardiovascular:      Rate and Rhythm: Regular rhythm. Tachycardia present.      Heart sounds: Normal heart sounds.   Pulmonary:      Effort: Pulmonary effort is normal. No respiratory distress.      Breath sounds: Rhonchi present.   Abdominal:      General: Abdomen is flat. There is no distension.      Palpations: Abdomen is soft.      Tenderness: There is no abdominal tenderness.   Skin:     General: Skin is warm and dry.   Neurological:      Mental Status: He is alert. Mental status is at baseline.         Significant Labs:   All pertinent labs within the past 24 hours have been reviewed.  CBC:   Recent Labs   Lab 02/17/22  0453 02/18/22  0949   WBC 13.48*  --    HGB 6.3* 7.6*   HCT 18.2* 22.8*     --        Significant Imaging: I have reviewed all pertinent imaging results/findings within the past 24 hours.

## 2022-02-19 LAB
ABO + RH BLD: NORMAL
ANION GAP SERPL CALCULATED.3IONS-SCNC: 13 MMOL/L (ref 7–16)
ANISOCYTOSIS BLD QL SMEAR: ABNORMAL
BASOPHILS # BLD AUTO: 0.03 K/UL (ref 0–0.2)
BASOPHILS NFR BLD AUTO: 0.3 % (ref 0–1)
BLD PROD TYP BPU: NORMAL
BLOOD UNIT EXPIRATION DATE: NORMAL
BLOOD UNIT TYPE CODE: 5100
BUN SERPL-MCNC: 30 MG/DL (ref 7–18)
BUN/CREAT SERPL: 30 (ref 6–20)
CALCIUM SERPL-MCNC: 9.5 MG/DL (ref 8.5–10.1)
CHLORIDE SERPL-SCNC: 108 MMOL/L (ref 98–107)
CO2 SERPL-SCNC: 26 MMOL/L (ref 21–32)
CREAT SERPL-MCNC: 1 MG/DL (ref 0.7–1.3)
CROSSMATCH INTERPRETATION: NORMAL
DIFFERENTIAL METHOD BLD: ABNORMAL
DISPENSE STATUS: NORMAL
EOSINOPHIL # BLD AUTO: 0.4 K/UL (ref 0–0.5)
EOSINOPHIL NFR BLD AUTO: 3.6 % (ref 1–4)
EOSINOPHIL NFR BLD MANUAL: 3 % (ref 1–4)
ERYTHROCYTE [DISTWIDTH] IN BLOOD BY AUTOMATED COUNT: 20.4 % (ref 11.5–14.5)
GLUCOSE SERPL-MCNC: 108 MG/DL (ref 74–106)
GLUCOSE SERPL-MCNC: 113 MG/DL (ref 70–105)
GLUCOSE SERPL-MCNC: 133 MG/DL (ref 70–105)
HCT VFR BLD AUTO: 20.4 % (ref 40–54)
HGB BLD-MCNC: 6.9 G/DL (ref 13.5–18)
IMM GRANULOCYTES # BLD AUTO: 1.15 K/UL (ref 0–0.04)
IMM GRANULOCYTES NFR BLD: 10.4 % (ref 0–0.4)
LYMPHOCYTES # BLD AUTO: 1.75 K/UL (ref 1–4.8)
LYMPHOCYTES NFR BLD AUTO: 15.8 % (ref 27–41)
LYMPHOCYTES NFR BLD MANUAL: 18 % (ref 27–41)
MCH RBC QN AUTO: 25.7 PG (ref 27–31)
MCHC RBC AUTO-ENTMCNC: 33.8 G/DL (ref 32–36)
MCV RBC AUTO: 76.1 FL (ref 80–96)
METAMYELOCYTES NFR BLD MANUAL: 3 %
MICROCYTES BLD QL SMEAR: ABNORMAL
MONOCYTES # BLD AUTO: 0.78 K/UL (ref 0–0.8)
MONOCYTES NFR BLD AUTO: 7 % (ref 2–6)
MONOCYTES NFR BLD MANUAL: 9 % (ref 2–6)
MPC BLD CALC-MCNC: 10.8 FL (ref 9.4–12.4)
MYELOCYTES NFR BLD MANUAL: 1 %
NEUTROPHILS # BLD AUTO: 6.96 K/UL (ref 1.8–7.7)
NEUTROPHILS NFR BLD AUTO: 62.9 % (ref 53–65)
NEUTS BAND NFR BLD MANUAL: 5 % (ref 1–5)
NEUTS SEG NFR BLD MANUAL: 61 % (ref 50–62)
NRBC # BLD AUTO: 0 X10E3/UL
NRBC, AUTO (.00): 0 %
OVALOCYTES BLD QL SMEAR: ABNORMAL
PLATELET # BLD AUTO: 291 K/UL (ref 150–400)
PLATELET MORPHOLOGY: NORMAL
POLYCHROMASIA BLD QL SMEAR: ABNORMAL
POTASSIUM SERPL-SCNC: 4.2 MMOL/L (ref 3.5–5.1)
RBC # BLD AUTO: 2.68 M/UL (ref 4.6–6.2)
SODIUM SERPL-SCNC: 143 MMOL/L (ref 136–145)
TARGETS BLD QL SMEAR: ABNORMAL
UNIT NUMBER: NORMAL
VANCOMYCIN TROUGH SERPL-MCNC: 14.4 ΜG/ML (ref 10–20)
WBC # BLD AUTO: 11.07 K/UL (ref 4.5–11)

## 2022-02-19 PROCEDURE — C9113 INJ PANTOPRAZOLE SODIUM, VIA: HCPCS | Performed by: HOSPITALIST

## 2022-02-19 PROCEDURE — 99233 PR SUBSEQUENT HOSPITAL CARE,LEVL III: ICD-10-PCS | Mod: ,,, | Performed by: FAMILY MEDICINE

## 2022-02-19 PROCEDURE — 27000221 HC OXYGEN, UP TO 24 HOURS

## 2022-02-19 PROCEDURE — 36415 COLL VENOUS BLD VENIPUNCTURE: CPT | Performed by: FAMILY MEDICINE

## 2022-02-19 PROCEDURE — 63600175 PHARM REV CODE 636 W HCPCS: Performed by: FAMILY MEDICINE

## 2022-02-19 PROCEDURE — 82962 GLUCOSE BLOOD TEST: CPT

## 2022-02-19 PROCEDURE — 94761 N-INVAS EAR/PLS OXIMETRY MLT: CPT

## 2022-02-19 PROCEDURE — 99233 SBSQ HOSP IP/OBS HIGH 50: CPT | Mod: ,,, | Performed by: FAMILY MEDICINE

## 2022-02-19 PROCEDURE — 85025 COMPLETE CBC W/AUTO DIFF WBC: CPT | Performed by: FAMILY MEDICINE

## 2022-02-19 PROCEDURE — 25000003 PHARM REV CODE 250: Performed by: HOSPITALIST

## 2022-02-19 PROCEDURE — 25000003 PHARM REV CODE 250: Performed by: FAMILY MEDICINE

## 2022-02-19 PROCEDURE — 63600175 PHARM REV CODE 636 W HCPCS: Performed by: HOSPITALIST

## 2022-02-19 PROCEDURE — 80048 BASIC METABOLIC PNL TOTAL CA: CPT | Performed by: FAMILY MEDICINE

## 2022-02-19 PROCEDURE — P9016 RBC LEUKOCYTES REDUCED: HCPCS | Performed by: FAMILY MEDICINE

## 2022-02-19 PROCEDURE — 80202 ASSAY OF VANCOMYCIN: CPT | Performed by: FAMILY MEDICINE

## 2022-02-19 PROCEDURE — 11000001 HC ACUTE MED/SURG PRIVATE ROOM

## 2022-02-19 PROCEDURE — 36430 TRANSFUSION BLD/BLD COMPNT: CPT

## 2022-02-19 RX ORDER — BISACODYL 10 MG
10 SUPPOSITORY, RECTAL RECTAL DAILY PRN
Status: DISCONTINUED | OUTPATIENT
Start: 2022-02-19 | End: 2022-02-21 | Stop reason: HOSPADM

## 2022-02-19 RX ORDER — HYDROCODONE BITARTRATE AND ACETAMINOPHEN 500; 5 MG/1; MG/1
TABLET ORAL
Status: DISCONTINUED | OUTPATIENT
Start: 2022-02-19 | End: 2022-02-21 | Stop reason: HOSPADM

## 2022-02-19 RX ADMIN — PANTOPRAZOLE SODIUM 40 MG: 40 INJECTION, POWDER, FOR SOLUTION INTRAVENOUS at 09:02

## 2022-02-19 RX ADMIN — POTASSIUM CHLORIDE, DEXTROSE MONOHYDRATE AND SODIUM CHLORIDE: 150; 5; 450 INJECTION, SOLUTION INTRAVENOUS at 05:02

## 2022-02-19 RX ADMIN — HEPARIN SODIUM 5000 UNITS: 5000 INJECTION INTRAVENOUS; SUBCUTANEOUS at 08:02

## 2022-02-19 RX ADMIN — LEVOFLOXACIN 500 MG: 5 INJECTION, SOLUTION INTRAVENOUS at 08:02

## 2022-02-19 RX ADMIN — CLINDAMYCIN PHOSPHATE 600 MG: 600 INJECTION, SOLUTION INTRAVENOUS at 12:02

## 2022-02-19 RX ADMIN — CLINDAMYCIN PHOSPHATE 600 MG: 600 INJECTION, SOLUTION INTRAVENOUS at 11:02

## 2022-02-19 RX ADMIN — HEPARIN SODIUM 5000 UNITS: 5000 INJECTION INTRAVENOUS; SUBCUTANEOUS at 10:02

## 2022-02-19 RX ADMIN — CLINDAMYCIN PHOSPHATE 600 MG: 600 INJECTION, SOLUTION INTRAVENOUS at 05:02

## 2022-02-19 RX ADMIN — BISACODYL 10 MG: 10 SUPPOSITORY RECTAL at 11:02

## 2022-02-19 RX ADMIN — CEFEPIME HYDROCHLORIDE 1 G: 1 INJECTION, POWDER, FOR SOLUTION INTRAMUSCULAR; INTRAVENOUS at 01:02

## 2022-02-19 RX ADMIN — VANCOMYCIN HYDROCHLORIDE 1250 MG: 5 INJECTION, POWDER, LYOPHILIZED, FOR SOLUTION INTRAVENOUS at 10:02

## 2022-02-19 RX ADMIN — CEFEPIME HYDROCHLORIDE 1 G: 1 INJECTION, POWDER, FOR SOLUTION INTRAMUSCULAR; INTRAVENOUS at 12:02

## 2022-02-19 RX ADMIN — SODIUM CHLORIDE: 9 INJECTION, SOLUTION INTRAVENOUS at 01:02

## 2022-02-19 RX ADMIN — POTASSIUM CHLORIDE, DEXTROSE MONOHYDRATE AND SODIUM CHLORIDE 100 ML/HR: 150; 5; 450 INJECTION, SOLUTION INTRAVENOUS at 01:02

## 2022-02-19 NOTE — SUBJECTIVE & OBJECTIVE
Interval History: Non verbal, no BM since occult blood ordered.     Review of Systems   Unable to perform ROS: Patient nonverbal   Objective:     Vital Signs (Most Recent):  Temp: 97.8 °F (36.6 °C) (02/19/22 1000)  Pulse: (!) 116 (02/19/22 1000)  Resp: 18 (02/19/22 1000)  BP: 126/85 (02/19/22 1000)  SpO2: 100 % (02/19/22 1000)   Vital Signs (24h Range):  Temp:  [97.4 °F (36.3 °C)-98.7 °F (37.1 °C)] 97.8 °F (36.6 °C)  Pulse:  [] 116  Resp:  [18-24] 18  SpO2:  [98 %-100 %] 100 %  BP: (110-131)/(58-85) 126/85     Weight: 71.2 kg (156 lb 15.5 oz)  Body mass index is 23.87 kg/m².    Intake/Output Summary (Last 24 hours) at 2/19/2022 1108  Last data filed at 2/19/2022 1005  Gross per 24 hour   Intake --   Output 4300 ml   Net -4300 ml      Physical Exam  Vitals reviewed.   HENT:      Nose: Nose normal.   Eyes:      General: No scleral icterus.  Cardiovascular:      Rate and Rhythm: Normal rate and regular rhythm.      Heart sounds: Normal heart sounds.   Pulmonary:      Effort: Pulmonary effort is normal. No respiratory distress.      Breath sounds: Rhonchi present.   Abdominal:      General: Abdomen is flat. There is no distension.      Palpations: Abdomen is soft.      Tenderness: There is no abdominal tenderness.   Skin:     General: Skin is warm and dry.   Neurological:      Mental Status: He is alert. Mental status is at baseline.       Significant Labs: All pertinent labs within the past 24 hours have been reviewed.  BMP:   Recent Labs   Lab 02/19/22  0337   *      K 4.2   *   CO2 26   BUN 30*   CREATININE 1.00   CALCIUM 9.5     CBC:   Recent Labs   Lab 02/18/22  0949 02/19/22  0337   WBC  --  11.07*   HGB 7.6* 6.9*   HCT 22.8* 20.4*   PLT  --  291       Significant Imaging: I have reviewed all pertinent imaging results/findings within the past 24 hours.

## 2022-02-19 NOTE — PLAN OF CARE
Problem: Adult Inpatient Plan of Care  Goal: Plan of Care Review  Outcome: Ongoing, Progressing  Goal: Patient-Specific Goal (Individualized)  Outcome: Ongoing, Progressing  Goal: Absence of Hospital-Acquired Illness or Injury  Outcome: Ongoing, Progressing  Goal: Optimal Comfort and Wellbeing  Outcome: Ongoing, Progressing  Goal: Readiness for Transition of Care  Outcome: Ongoing, Progressing     Problem: Fall Injury Risk  Goal: Absence of Fall and Fall-Related Injury  Outcome: Ongoing, Progressing     Problem: Skin Injury Risk Increased  Goal: Skin Health and Integrity  Outcome: Ongoing, Progressing     Problem: Fluid and Electrolyte Imbalance (Acute Kidney Injury/Impairment)  Goal: Fluid and Electrolyte Balance  Outcome: Ongoing, Progressing     Problem: Oral Intake Inadequate (Acute Kidney Injury/Impairment)  Goal: Optimal Nutrition Intake  Outcome: Ongoing, Progressing     Problem: Renal Function Impairment (Acute Kidney Injury/Impairment)  Goal: Effective Renal Function  Outcome: Ongoing, Progressing     Problem: Fluid Imbalance (Pneumonia)  Goal: Fluid Balance  Outcome: Ongoing, Progressing     Problem: Infection (Pneumonia)  Goal: Resolution of Infection Signs and Symptoms  Outcome: Ongoing, Progressing     Problem: Respiratory Compromise (Pneumonia)  Goal: Effective Oxygenation and Ventilation  Outcome: Ongoing, Progressing     Problem: Impaired Wound Healing  Goal: Optimal Wound Healing  Outcome: Ongoing, Progressing     Problem: Infection  Goal: Absence of Infection Signs and Symptoms  Outcome: Ongoing, Progressing

## 2022-02-19 NOTE — ASSESSMENT & PLAN NOTE
Iron studies . 40% saturation. Iron 39 with TIBC of 98. Less likely RAMO. Ferritin elevated, could represent adequate iron  stores and/or acute phase reactant from his illness. On Protonix. No anticoagulation.   Alpha thal in differential.  Monitor for evidence of bleeding.        H/H has fallen to 6.9 after rise to 7.6 with one unit.  No icterus to suggest significant hemolysis though will check haptoglobin, LDH, and retic at next blood draw.  No BM in last several days since occult blood ordered.  Will give dulcolax. He is now well enough for endoscopy if stool +.

## 2022-02-19 NOTE — ASSESSMENT & PLAN NOTE
- Wound care consulted for this unstageable wound. Bedside debridement performed by surgery. Wound care following.

## 2022-02-19 NOTE — PROGRESS NOTES
[facility] - [address] [place] [address] Medicine  Progress Note    Patient Name: Owen Solis  MRN: [id number]  Patient Class: IP- Inpatient   Admission Date: 2/11/2022  Length of Stay: 8 days  Attending Physician: Yfn Richardson Jr., MD  Primary Care Provider: Angelina Jaramillo II, MD        Subjective:     Principal Problem:Aspiration pneumonia of left lower lobe        HPI:    Mr. Solis is a 62yo M who presents from [facility] with shortness of breath and hypoxia.     The patient was recently discharged from [facility] on 2/7 after being treated for UTI, HCAP believed to be secondary to aspiration, and dehydration. He presents similarly today. Patient has had failure to thrive post-COVID and currently has a PEG for tube feedings. During last admission, patient was suctioned and food remnants were found. Upon suctioning on arrival today they were found again. Patient has known dysphagia only receiving PEG feeds and was unable to pass a swallow evaluation during the last hospitalization. Patient turns his head from me during exam but does not otherwise respond. Patient has a PMH of Alzheimer's dementia, COVID-19, HTN.     On arrival, patient was febrile to 102.9 and tachycardic to 140. EKG with sinus tachycardia. Leukocytosis of 32, Na 146, BUN/Cr 36/2.19 with GFR 32. CXR with a large L sided PNA. Lactic acid initially 6.9 and repeat 5.8. Patient has received 2L NS bolus and zosyn in the ED. Will admit patient to the hospitalist service for further IV antibiotics and IVF resuscitation.       Overview/Hospital Course:  2/19 - Completing abx. Respiratory status has improved. However H/H has been falling. He has not had BM.  No hematemesis. Labs are microcytic but iron saturation 40%. No evidence of hemolysis. Giving dulcolax and hopefully we will have stool to test soon.  Has been too sick for GI eval but now well enough to consider if stools heme +.       Interval History: Non verbal, no BM since occult blood  ordered.     Review of Systems   Unable to perform ROS: Patient nonverbal   Objective:     Vital Signs (Most Recent):  Temp: 97.8 °F (36.6 °C) (02/19/22 1000)  Pulse: (!) 116 (02/19/22 1000)  Resp: 18 (02/19/22 1000)  BP: 126/85 (02/19/22 1000)  SpO2: 100 % (02/19/22 1000)   Vital Signs (24h Range):  Temp:  [97.4 °F (36.3 °C)-98.7 °F (37.1 °C)] 97.8 °F (36.6 °C)  Pulse:  [] 116  Resp:  [18-24] 18  SpO2:  [98 %-100 %] 100 %  BP: (110-131)/(58-85) 126/85     Weight: 71.2 kg (156 lb 15.5 oz)  Body mass index is 23.87 kg/m².    Intake/Output Summary (Last 24 hours) at 2/19/2022 1108  Last data filed at 2/19/2022 1005  Gross per 24 hour   Intake --   Output 4300 ml   Net -4300 ml      Physical Exam  Vitals reviewed.   HENT:      Nose: Nose normal.   Eyes:      General: No scleral icterus.  Cardiovascular:      Rate and Rhythm: Normal rate and regular rhythm.      Heart sounds: Normal heart sounds.   Pulmonary:      Effort: Pulmonary effort is normal. No respiratory distress.      Breath sounds: Rhonchi present.   Abdominal:      General: Abdomen is flat. There is no distension.      Palpations: Abdomen is soft.      Tenderness: There is no abdominal tenderness.   Skin:     General: Skin is warm and dry.   Neurological:      Mental Status: He is alert. Mental status is at baseline.       Significant Labs: All pertinent labs within the past 24 hours have been reviewed.  BMP:   Recent Labs   Lab 02/19/22  0337   *      K 4.2   *   CO2 26   BUN 30*   CREATININE 1.00   CALCIUM 9.5     CBC:   Recent Labs   Lab 02/18/22  0949 02/19/22  0337   WBC  --  11.07*   HGB 7.6* 6.9*   HCT 22.8* 20.4*   PLT  --  291       Significant Imaging: I have reviewed all pertinent imaging results/findings within the past 24 hours.      Assessment/Plan:      * Aspiration pneumonia of left lower lobe  Large L lobar PNA on CXR. Patient hypoxic requiring supplemental O2. Febrile to 102.9 with leukocytosis of 32 on admission.  Lactic acid 6.9 -> 5.8. Concern for aspiration as food particles were suctioned out on arrival.      - Abx: Vancomycin, cefepime, clindamycin, levaquin. Stopping now.   - Blood cultures no growth   - Supplemental oxygen PRN  - NPO   - tube feeding resumed.   - Improving slowly.    Microcytic anemia    Iron studies . 40% saturation. Iron 39 with TIBC of 98. Less likely RAMO. Ferritin elevated, could represent adequate iron  stores and/or acute phase reactant from his illness. On Protonix. No anticoagulation.   Alpha thal in differential.  Monitor for evidence of bleeding.        H/H has fallen to 6.9 after rise to 7.6 with one unit.  No icterus to suggest significant hemolysis though will check haptoglobin, LDH, and retic at next blood draw.  No BM in last several days since occult blood ordered.  Will give dulcolax. He is now well enough for endoscopy if stool +.    EDITH (acute kidney injury)  EDITH likely secondary to dehydration. Improved with fluids    Sacral wound  - Wound care consulted for this unstageable wound. Bedside debridement performed by surgery. Wound care following.     Dysphagia  - Patient with known dysphagia and continued concern for aspiration PNA   - PEG in place with feedings going. Increased free water flushes.   - NPO     Complication of gastrostomy tube    G- tube began leaking after my visit yesterday, replaced by Dr. Salas.        VTE Risk Mitigation (From admission, onward)         Ordered     heparin (porcine) injection 5,000 Units  Every 12 hours         02/12/22 0203     IP VTE LOW RISK PATIENT  Once         02/12/22 0203     Place sequential compression device  Until discontinued         02/12/22 0203                Discharge Planning   ESTEFANY:      Code Status: Full Code   Is the patient medically ready for discharge?:     Reason for patient still in hospital (select all that apply): Treatment  Discharge Plan A: Return to nursing home                  Yfn Richardson Jr, MD  Department of  [facility] Medicine   [facility] - [facility]

## 2022-02-19 NOTE — HOSPITAL COURSE
2/19 - Completing abx. Respiratory status has improved. However H/H has been falling. He has not had BM.  No hematemesis. Labs are microcytic but iron saturation 40%. No evidence of hemolysis. Giving dulcolax and hopefully we will have stool to test soon.  Has been too sick for GI eval but now well enough to consider if stools heme +.   02/21 Patient stable. FOBT is negative. Tolerating PEG feedings. Will DC today to NH. Continue PEG feedings and wound care.

## 2022-02-19 NOTE — ASSESSMENT & PLAN NOTE
Large L lobar PNA on CXR. Patient hypoxic requiring supplemental O2. Febrile to 102.9 with leukocytosis of 32 on admission. Lactic acid 6.9 -> 5.8. Concern for aspiration as food particles were suctioned out on arrival.      - Abx: Vancomycin, cefepime, clindamycin, levaquin. Stopping now.   - Blood cultures no growth   - Supplemental oxygen PRN  - NPO   - tube feeding resumed.   - Improving slowly.

## 2022-02-20 LAB
ANION GAP SERPL CALCULATED.3IONS-SCNC: 14 MMOL/L (ref 7–16)
ANISOCYTOSIS BLD QL SMEAR: ABNORMAL
BASOPHILS # BLD AUTO: 0.03 K/UL (ref 0–0.2)
BASOPHILS NFR BLD AUTO: 0.3 % (ref 0–1)
BUN SERPL-MCNC: 28 MG/DL (ref 7–18)
BUN/CREAT SERPL: 29 (ref 6–20)
CALCIUM SERPL-MCNC: 10.2 MG/DL (ref 8.5–10.1)
CHLORIDE SERPL-SCNC: 106 MMOL/L (ref 98–107)
CO2 SERPL-SCNC: 27 MMOL/L (ref 21–32)
CREAT SERPL-MCNC: 0.98 MG/DL (ref 0.7–1.3)
DACRYOCYTES BLD QL SMEAR: ABNORMAL
DIFFERENTIAL METHOD BLD: ABNORMAL
EOSINOPHIL # BLD AUTO: 0.26 K/UL (ref 0–0.5)
EOSINOPHIL NFR BLD AUTO: 2.2 % (ref 1–4)
ERYTHROCYTE [DISTWIDTH] IN BLOOD BY AUTOMATED COUNT: 20.3 % (ref 11.5–14.5)
GLUCOSE SERPL-MCNC: 124 MG/DL (ref 74–106)
GLUCOSE SERPL-MCNC: 132 MG/DL (ref 70–105)
HAPTOGLOB SERPL NEPH-MCNC: 342 MG/DL (ref 30–200)
HCT VFR BLD AUTO: 27.1 % (ref 40–54)
HGB BLD-MCNC: 9.3 G/DL (ref 13.5–18)
IMM GRANULOCYTES # BLD AUTO: 0.7 K/UL (ref 0–0.04)
IMM GRANULOCYTES NFR BLD: 5.9 % (ref 0–0.4)
IMM RETICS NFR: 35.8 % (ref 2.3–13.4)
LDH SERPL-CCNC: 185 U/L (ref 87–241)
LYMPHOCYTES # BLD AUTO: 1.57 K/UL (ref 1–4.8)
LYMPHOCYTES NFR BLD AUTO: 13.2 % (ref 27–41)
LYMPHOCYTES NFR BLD MANUAL: 16 % (ref 27–41)
MCH RBC QN AUTO: 26.6 PG (ref 27–31)
MCHC RBC AUTO-ENTMCNC: 34.3 G/DL (ref 32–36)
MCV RBC AUTO: 77.7 FL (ref 80–96)
METAMYELOCYTES NFR BLD MANUAL: 2 %
MONOCYTES # BLD AUTO: 0.62 K/UL (ref 0–0.8)
MONOCYTES NFR BLD AUTO: 5.2 % (ref 2–6)
MONOCYTES NFR BLD MANUAL: 3 % (ref 2–6)
MPC BLD CALC-MCNC: 9.9 FL (ref 9.4–12.4)
NEUTROPHILS # BLD AUTO: 8.7 K/UL (ref 1.8–7.7)
NEUTROPHILS NFR BLD AUTO: 73.2 % (ref 53–65)
NEUTS BAND NFR BLD MANUAL: 2 % (ref 1–5)
NEUTS SEG NFR BLD MANUAL: 77 % (ref 50–62)
NRBC # BLD AUTO: 0 X10E3/UL
NRBC, AUTO (.00): 0 %
OCCULT BLOOD, STOOL #1: NEGATIVE
OVALOCYTES BLD QL SMEAR: ABNORMAL
PLATELET # BLD AUTO: 239 K/UL (ref 150–400)
PLATELET MORPHOLOGY: NORMAL
POLYCHROMASIA BLD QL SMEAR: ABNORMAL
POTASSIUM SERPL-SCNC: 4.2 MMOL/L (ref 3.5–5.1)
RBC # BLD AUTO: 3.49 M/UL (ref 4.6–6.2)
RETICS # AUTO: 0.08 X10E6/UL (ref 0.02–0.11)
RETICS/RBC NFR AUTO: 2.4 % (ref 0.4–2.2)
SCHISTOCYTES BLD QL AUTO: ABNORMAL
SODIUM SERPL-SCNC: 143 MMOL/L (ref 136–145)
TARGETS BLD QL SMEAR: ABNORMAL
WBC # BLD AUTO: 11.88 K/UL (ref 4.5–11)

## 2022-02-20 PROCEDURE — 25000003 PHARM REV CODE 250: Performed by: HOSPITALIST

## 2022-02-20 PROCEDURE — 85025 COMPLETE CBC W/AUTO DIFF WBC: CPT | Performed by: FAMILY MEDICINE

## 2022-02-20 PROCEDURE — 83010 ASSAY OF HAPTOGLOBIN QUANT: CPT | Performed by: FAMILY MEDICINE

## 2022-02-20 PROCEDURE — 83615 LACTATE (LD) (LDH) ENZYME: CPT | Performed by: FAMILY MEDICINE

## 2022-02-20 PROCEDURE — 82962 GLUCOSE BLOOD TEST: CPT

## 2022-02-20 PROCEDURE — 63600175 PHARM REV CODE 636 W HCPCS: Performed by: FAMILY MEDICINE

## 2022-02-20 PROCEDURE — 36415 COLL VENOUS BLD VENIPUNCTURE: CPT | Performed by: FAMILY MEDICINE

## 2022-02-20 PROCEDURE — 99232 SBSQ HOSP IP/OBS MODERATE 35: CPT | Mod: ,,, | Performed by: FAMILY MEDICINE

## 2022-02-20 PROCEDURE — 63600175 PHARM REV CODE 636 W HCPCS: Performed by: HOSPITALIST

## 2022-02-20 PROCEDURE — C9113 INJ PANTOPRAZOLE SODIUM, VIA: HCPCS | Performed by: HOSPITALIST

## 2022-02-20 PROCEDURE — 82272 OCCULT BLD FECES 1-3 TESTS: CPT

## 2022-02-20 PROCEDURE — 11000001 HC ACUTE MED/SURG PRIVATE ROOM

## 2022-02-20 PROCEDURE — 94761 N-INVAS EAR/PLS OXIMETRY MLT: CPT

## 2022-02-20 PROCEDURE — 85045 AUTOMATED RETICULOCYTE COUNT: CPT | Performed by: FAMILY MEDICINE

## 2022-02-20 PROCEDURE — 25000003 PHARM REV CODE 250: Performed by: FAMILY MEDICINE

## 2022-02-20 PROCEDURE — 99232 PR SUBSEQUENT HOSPITAL CARE,LEVL II: ICD-10-PCS | Mod: ,,, | Performed by: FAMILY MEDICINE

## 2022-02-20 PROCEDURE — 80048 BASIC METABOLIC PNL TOTAL CA: CPT | Performed by: FAMILY MEDICINE

## 2022-02-20 RX ADMIN — VANCOMYCIN HYDROCHLORIDE 1250 MG: 5 INJECTION, POWDER, LYOPHILIZED, FOR SOLUTION INTRAVENOUS at 10:02

## 2022-02-20 RX ADMIN — HEPARIN SODIUM 5000 UNITS: 5000 INJECTION INTRAVENOUS; SUBCUTANEOUS at 08:02

## 2022-02-20 RX ADMIN — POTASSIUM CHLORIDE, DEXTROSE MONOHYDRATE AND SODIUM CHLORIDE: 150; 5; 450 INJECTION, SOLUTION INTRAVENOUS at 09:02

## 2022-02-20 RX ADMIN — PANTOPRAZOLE SODIUM 40 MG: 40 INJECTION, POWDER, FOR SOLUTION INTRAVENOUS at 08:02

## 2022-02-20 RX ADMIN — LEVOFLOXACIN 500 MG: 5 INJECTION, SOLUTION INTRAVENOUS at 08:02

## 2022-02-20 RX ADMIN — CLINDAMYCIN PHOSPHATE 600 MG: 600 INJECTION, SOLUTION INTRAVENOUS at 05:02

## 2022-02-20 RX ADMIN — CLINDAMYCIN PHOSPHATE 600 MG: 600 INJECTION, SOLUTION INTRAVENOUS at 11:02

## 2022-02-20 RX ADMIN — CEFEPIME HYDROCHLORIDE 1 G: 1 INJECTION, POWDER, FOR SOLUTION INTRAMUSCULAR; INTRAVENOUS at 12:02

## 2022-02-20 RX ADMIN — CEFEPIME HYDROCHLORIDE 1 G: 1 INJECTION, POWDER, FOR SOLUTION INTRAMUSCULAR; INTRAVENOUS at 01:02

## 2022-02-21 VITALS
SYSTOLIC BLOOD PRESSURE: 114 MMHG | OXYGEN SATURATION: 97 % | HEIGHT: 68 IN | WEIGHT: 156.94 LBS | RESPIRATION RATE: 23 BRPM | DIASTOLIC BLOOD PRESSURE: 79 MMHG | HEART RATE: 110 BPM | BODY MASS INDEX: 23.79 KG/M2 | TEMPERATURE: 98 F

## 2022-02-21 LAB
ANION GAP SERPL CALCULATED.3IONS-SCNC: 12 MMOL/L (ref 7–16)
ANISOCYTOSIS BLD QL SMEAR: ABNORMAL
BASOPHILS # BLD AUTO: 0.03 K/UL (ref 0–0.2)
BASOPHILS NFR BLD AUTO: 0.2 % (ref 0–1)
BUN SERPL-MCNC: 29 MG/DL (ref 7–18)
BUN/CREAT SERPL: 28 (ref 6–20)
CALCIUM SERPL-MCNC: 10.2 MG/DL (ref 8.5–10.1)
CHLORIDE SERPL-SCNC: 105 MMOL/L (ref 98–107)
CO2 SERPL-SCNC: 26 MMOL/L (ref 21–32)
CREAT SERPL-MCNC: 1.05 MG/DL (ref 0.7–1.3)
DACRYOCYTES BLD QL SMEAR: ABNORMAL
DIFFERENTIAL METHOD BLD: ABNORMAL
EOSINOPHIL # BLD AUTO: 0.4 K/UL (ref 0–0.5)
EOSINOPHIL NFR BLD AUTO: 2.8 % (ref 1–4)
EOSINOPHIL NFR BLD MANUAL: 2 % (ref 1–4)
ERYTHROCYTE [DISTWIDTH] IN BLOOD BY AUTOMATED COUNT: 20.4 % (ref 11.5–14.5)
GLUCOSE SERPL-MCNC: 111 MG/DL (ref 74–106)
HCT VFR BLD AUTO: 25.5 % (ref 40–54)
HGB BLD-MCNC: 8.8 G/DL (ref 13.5–18)
HYPOCHROMIA BLD QL SMEAR: ABNORMAL
IMM GRANULOCYTES # BLD AUTO: 0.75 K/UL (ref 0–0.04)
IMM GRANULOCYTES NFR BLD: 5.2 % (ref 0–0.4)
LYMPHOCYTES # BLD AUTO: 1.74 K/UL (ref 1–4.8)
LYMPHOCYTES NFR BLD AUTO: 12.1 % (ref 27–41)
LYMPHOCYTES NFR BLD MANUAL: 12 % (ref 27–41)
MCH RBC QN AUTO: 26.5 PG (ref 27–31)
MCHC RBC AUTO-ENTMCNC: 34.5 G/DL (ref 32–36)
MCV RBC AUTO: 76.8 FL (ref 80–96)
METAMYELOCYTES NFR BLD MANUAL: 1 %
MICROCYTES BLD QL SMEAR: ABNORMAL
MONOCYTES # BLD AUTO: 0.66 K/UL (ref 0–0.8)
MONOCYTES NFR BLD AUTO: 4.6 % (ref 2–6)
MONOCYTES NFR BLD MANUAL: 2 % (ref 2–6)
MPC BLD CALC-MCNC: 9.5 FL (ref 9.4–12.4)
MYELOCYTES NFR BLD MANUAL: 1 %
NEUTROPHILS # BLD AUTO: 10.76 K/UL (ref 1.8–7.7)
NEUTROPHILS NFR BLD AUTO: 75.1 % (ref 53–65)
NEUTS BAND NFR BLD MANUAL: 4 % (ref 1–5)
NEUTS SEG NFR BLD MANUAL: 78 % (ref 50–62)
NRBC # BLD AUTO: 0 X10E3/UL
NRBC, AUTO (.00): 0 %
OVALOCYTES BLD QL SMEAR: ABNORMAL
PLATELET # BLD AUTO: 254 K/UL (ref 150–400)
PLATELET MORPHOLOGY: NORMAL
POLYCHROMASIA BLD QL SMEAR: ABNORMAL
POTASSIUM SERPL-SCNC: 4.8 MMOL/L (ref 3.5–5.1)
RBC # BLD AUTO: 3.32 M/UL (ref 4.6–6.2)
SCHISTOCYTES BLD QL AUTO: ABNORMAL
SODIUM SERPL-SCNC: 138 MMOL/L (ref 136–145)
TARGETS BLD QL SMEAR: ABNORMAL
WBC # BLD AUTO: 14.34 K/UL (ref 4.5–11)

## 2022-02-21 PROCEDURE — 85025 COMPLETE CBC W/AUTO DIFF WBC: CPT | Performed by: FAMILY MEDICINE

## 2022-02-21 PROCEDURE — 63600175 PHARM REV CODE 636 W HCPCS: Performed by: FAMILY MEDICINE

## 2022-02-21 PROCEDURE — 99239 PR HOSPITAL DISCHARGE DAY,>30 MIN: ICD-10-PCS | Mod: ,,, | Performed by: FAMILY MEDICINE

## 2022-02-21 PROCEDURE — 25000003 PHARM REV CODE 250: Performed by: HOSPITALIST

## 2022-02-21 PROCEDURE — 80048 BASIC METABOLIC PNL TOTAL CA: CPT | Performed by: FAMILY MEDICINE

## 2022-02-21 PROCEDURE — 99239 HOSP IP/OBS DSCHRG MGMT >30: CPT | Mod: ,,, | Performed by: FAMILY MEDICINE

## 2022-02-21 PROCEDURE — 36415 COLL VENOUS BLD VENIPUNCTURE: CPT | Performed by: FAMILY MEDICINE

## 2022-02-21 PROCEDURE — C9113 INJ PANTOPRAZOLE SODIUM, VIA: HCPCS | Performed by: HOSPITALIST

## 2022-02-21 PROCEDURE — 63600175 PHARM REV CODE 636 W HCPCS: Performed by: HOSPITALIST

## 2022-02-21 RX ADMIN — CLINDAMYCIN PHOSPHATE 600 MG: 600 INJECTION, SOLUTION INTRAVENOUS at 12:02

## 2022-02-21 RX ADMIN — CLINDAMYCIN PHOSPHATE 600 MG: 600 INJECTION, SOLUTION INTRAVENOUS at 05:02

## 2022-02-21 RX ADMIN — CEFEPIME HYDROCHLORIDE 1 G: 1 INJECTION, POWDER, FOR SOLUTION INTRAMUSCULAR; INTRAVENOUS at 01:02

## 2022-02-21 RX ADMIN — PANTOPRAZOLE SODIUM 40 MG: 40 INJECTION, POWDER, FOR SOLUTION INTRAVENOUS at 09:02

## 2022-02-21 RX ADMIN — POTASSIUM CHLORIDE, DEXTROSE MONOHYDRATE AND SODIUM CHLORIDE: 150; 5; 450 INJECTION, SOLUTION INTRAVENOUS at 04:02

## 2022-02-21 RX ADMIN — HEPARIN SODIUM 5000 UNITS: 5000 INJECTION INTRAVENOUS; SUBCUTANEOUS at 09:02

## 2022-02-21 NOTE — PLAN OF CARE
[facility] - [facility] Telemetry  Discharge Final Note    Primary Care Provider: Angelina Jaramillo II, MD    Expected Discharge Date:     Final Discharge Note (most recent)     Final Note - 02/21/22 0852        Final Note    Assessment Type Final Discharge Note     Anticipated Discharge Disposition Skilled Nursing Facility        Post-Acute Status    Post-Acute Authorization Placement     Post-Acute Placement Status Set-up Complete/Auth obtained     Patient choice form signed by patient/caregiver List with quality metrics by geographic area provided;List from CMS Compare;List from System Post-Acute Care     Discharge Delays None known at this time                 Important Message from Medicare  Important Message from Medicare regarding Discharge Appeal Rights: Explained to patient/caregiver, Signed/date by patient/caregiver, Given to patient/caregiver     Date IMM was signed: 02/14/22  Time IMM was signed: [id number]    pt to d/c back to [facility] today. No further needs.

## 2022-02-21 NOTE — ASSESSMENT & PLAN NOTE
Large L lobar PNA on CXR. Patient hypoxic requiring supplemental O2. Febrile to 102.9 with leukocytosis of 32 on admission. Lactic acid 6.9 -> 5.8. Concern for aspiration as food particles were suctioned out on arrival.      - Abx: Vancomycin, cefepime, clindamycin, levaquin. Completed 7 days.   - Blood cultures no growth   - Supplemental oxygen PRN  - NPO   - tube feeding resumed.   - Improved

## 2022-02-21 NOTE — SUBJECTIVE & OBJECTIVE
Interval History:  No reported problems in this non verbal patient. Stool was brown and heme negative.     Review of Systems   Unable to perform ROS: Patient nonverbal   Objective:     Vital Signs (Most Recent):  Temp: 97.5 °F (36.4 °C) (02/20/22 1603)  Pulse: (!) 111 (02/20/22 1603)  Resp: 20 (02/20/22 1603)  BP: 126/78 (02/20/22 1603)  SpO2: 99 % (02/20/22 1603)   Vital Signs (24h Range):  Temp:  [97.5 °F (36.4 °C)-99.6 °F (37.6 °C)] 97.5 °F (36.4 °C)  Pulse:  [109-117] 111  Resp:  [18-24] 20  SpO2:  [97 %-100 %] 99 %  BP: (108-131)/(74-84) 126/78     Weight: 71.2 kg (156 lb 15.5 oz)  Body mass index is 23.87 kg/m².    Intake/Output Summary (Last 24 hours) at 2/20/2022 1911  Last data filed at 2/20/2022 1600  Gross per 24 hour   Intake 0 ml   Output 3400 ml   Net -3400 ml      Physical Exam  Vitals reviewed.   Constitutional:       General: He is not in acute distress.     Appearance: He is not ill-appearing or toxic-appearing.   HENT:      Nose: Nose normal.   Eyes:      General: No scleral icterus.  Cardiovascular:      Rate and Rhythm: Normal rate and regular rhythm.      Heart sounds: Normal heart sounds.   Pulmonary:      Effort: Pulmonary effort is normal. No respiratory distress.      Breath sounds: No wheezing or rhonchi.   Abdominal:      General: Abdomen is flat. There is no distension.      Palpations: Abdomen is soft.      Tenderness: There is no abdominal tenderness.   Skin:     General: Skin is warm and dry.   Neurological:      Mental Status: He is alert. Mental status is at baseline.       Significant Labs: All pertinent labs within the past 24 hours have been reviewed.  CBC:   Recent Labs   Lab 02/19/22  0337 02/20/22  1552   WBC 11.07* 11.88*   HGB 6.9* 9.3*   HCT 20.4* 27.1*    239     CMP:   Recent Labs   Lab 02/19/22  0337 02/20/22  1458    143   K 4.2 4.2   * 106   CO2 26 27   * 124*   BUN 30* 28*   CREATININE 1.00 0.98   CALCIUM 9.5 10.2*   ANIONGAP 13 14    EGFRNONAA 80 82       Significant Imaging: I have reviewed all pertinent imaging results/findings within the past 24 hours.

## 2022-02-21 NOTE — ASSESSMENT & PLAN NOTE
Iron studies . 40% saturation. Iron 39 with TIBC of 98. Less likely RAMO. Ferritin elevated, could represent adequate iron  stores and/or acute phase reactant from his illness. On Protonix. No anticoagulation.   Alpha thal in differential.  Monitor for evidence of bleeding.        Received another unit of blood. Hemoglobin now 9.3. Stool heme neg. LDH normal, haptoglobin high which argues against hemolysis. I do suspect a-thal as cause of his microcytic anemia.

## 2022-02-21 NOTE — PLAN OF CARE
Problem: Adult Inpatient Plan of Care  Goal: Plan of Care Review  Outcome: Ongoing, Progressing  Goal: Patient-Specific Goal (Individualized)  Outcome: Ongoing, Progressing  Goal: Absence of Hospital-Acquired Illness or Injury  Outcome: Ongoing, Progressing  Intervention: Identify and Manage Fall Risk  Flowsheets (Taken 2/20/2022 2334)  Safety Promotion/Fall Prevention: assistive device/personal item within reach  Intervention: Prevent Skin Injury  Flowsheets (Taken 2/20/2022 2334)  Body Position: position maintained  Skin Protection: adhesive use limited  Intervention: Prevent and Manage VTE (Venous Thromboembolism) Risk  Flowsheets (Taken 2/20/2022 2334)  Activity Management: Patient unable to perform activities  VTE Prevention/Management: bleeding precations maintained  Intervention: Prevent Infection  Flowsheets (Taken 2/20/2022 2334)  Infection Prevention:   single patient room provided   rest/sleep promoted  Goal: Optimal Comfort and Wellbeing  Outcome: Ongoing, Progressing  Intervention: Monitor Pain and Promote Comfort  Flowsheets (Taken 2/20/2022 2334)  Pain Management Interventions:   care clustered   quiet environment facilitated  Intervention: Provide Person-Centered Care  Flowsheets (Taken 2/20/2022 2334)  Trust Relationship/Rapport:   care explained   choices provided  Goal: Readiness for Transition of Care  Outcome: Ongoing, Progressing  Intervention: Mutually Develop Transition Plan  Flowsheets (Taken 2/20/2022 2334)  Equipment Currently Used at Home: wheelchair  Transportation Anticipated: family or friend will provide  Communicated ESTEFANY with patient/caregiver: Date not available/Unable to determine     Problem: Fall Injury Risk  Goal: Absence of Fall and Fall-Related Injury  Outcome: Ongoing, Progressing  Intervention: Identify and Manage Contributors  Flowsheets (Taken 2/20/2022 2334)  Self-Care Promotion: safe use of adaptive equipment encouraged  Medication Review/Management: medications  reviewed  Intervention: Promote Injury-Free Environment  Flowsheets (Taken 2/20/2022 2334)  Safety Promotion/Fall Prevention: assistive device/personal item within reach     Problem: Skin Injury Risk Increased  Goal: Skin Health and Integrity  Outcome: Ongoing, Progressing  Intervention: Optimize Skin Protection  Flowsheets (Taken 2/20/2022 2334)  Pressure Reduction Techniques: pressure points protected  Pressure Reduction Devices: positioning supports utilized  Skin Protection: adhesive use limited  Head of Bed (HOB) Positioning: HOB elevated  Intervention: Promote and Optimize Oral Intake  Flowsheets (Taken 2/20/2022 2334)  Oral Nutrition Promotion: safe use of adaptive equipment encouraged     Problem: Fluid and Electrolyte Imbalance (Acute Kidney Injury/Impairment)  Goal: Fluid and Electrolyte Balance  Outcome: Ongoing, Progressing     Problem: Oral Intake Inadequate (Acute Kidney Injury/Impairment)  Goal: Optimal Nutrition Intake  Outcome: Ongoing, Progressing  Intervention: Promote and Optimize Nutrition  Flowsheets (Taken 2/20/2022 2334)  Oral Nutrition Promotion: safe use of adaptive equipment encouraged     Problem: Renal Function Impairment (Acute Kidney Injury/Impairment)  Goal: Effective Renal Function  Outcome: Ongoing, Progressing  Intervention: Monitor and Support Renal Function  Flowsheets (Taken 2/20/2022 2334)  Stabilization Measures: airway opened  Medication Review/Management: medications reviewed     Problem: Fluid Imbalance (Pneumonia)  Goal: Fluid Balance  Outcome: Ongoing, Progressing     Problem: Infection (Pneumonia)  Goal: Resolution of Infection Signs and Symptoms  Outcome: Ongoing, Progressing  Intervention: Prevent Infection Progression  Flowsheets (Taken 2/20/2022 2334)  Fever Reduction/Comfort Measures:   lightweight bedding   lightweight clothing  Infection Management: aseptic technique maintained  Isolation Precautions: precautions maintained     Problem: Respiratory Compromise  (Pneumonia)  Goal: Effective Oxygenation and Ventilation  Outcome: Ongoing, Progressing  Intervention: Promote Airway Secretion Clearance  Flowsheets (Taken 2/20/2022 2334)  Cough And Deep Breathing: unable to perform  Intervention: Optimize Oxygenation and Ventilation  Flowsheets (Taken 2/20/2022 2334)  Airway/Ventilation Management: airway patency maintained  Head of Bed (HOB) Positioning: HOB elevated     Problem: Impaired Wound Healing  Goal: Optimal Wound Healing  Outcome: Ongoing, Progressing  Intervention: Promote Wound Healing  Flowsheets (Taken 2/20/2022 2334)  Oral Nutrition Promotion: safe use of adaptive equipment encouraged  Sleep/Rest Enhancement: awakenings minimized  Activity Management: Patient unable to perform activities  Pain Management Interventions:   care clustered   quiet environment facilitated     Problem: Infection  Goal: Absence of Infection Signs and Symptoms  Outcome: Ongoing, Progressing  Intervention: Prevent or Manage Infection  Flowsheets (Taken 2/20/2022 2334)  Fever Reduction/Comfort Measures:   lightweight bedding   lightweight clothing  Infection Management: aseptic technique maintained  Isolation Precautions: precautions maintained

## 2022-02-21 NOTE — NURSING
1425 Spoke with Dr Rhoades regarding maintaining the patients catheter to help with his wound progress. He ordered to send him back to the NH with the catheter intact. Will inform NH.  1445 report given to Eladio.

## 2022-02-21 NOTE — DISCHARGE SUMMARY
[facility] - [address] [place] [address] Medicine  Discharge Summary      Patient Name: Owen Solis  MRN: [id number]  Patient Class: IP- Inpatient  Admission Date: 2/11/2022  Hospital Length of Stay: 10 days  Discharge Date and Time: 02/21/2022  Attending Physician: Ramu Rhoades MD   Discharging Provider: Ramu Rhoades MD  Primary Care Provider: Angelina Jaramillo II, MD      HPI:     Mr. Solis is a 62yo M who presents from [facility] with shortness of breath and hypoxia.     The patient was recently discharged from [facility] on 2/7 after being treated for UTI, HCAP believed to be secondary to aspiration, and dehydration. He presents similarly today. Patient has had failure to thrive post-COVID and currently has a PEG for tube feedings. During last admission, patient was suctioned and food remnants were found. Upon suctioning on arrival today they were found again. Patient has known dysphagia only receiving PEG feeds and was unable to pass a swallow evaluation during the last hospitalization. Patient turns his head from me during exam but does not otherwise respond. Patient has a PMH of Alzheimer's dementia, COVID-19, HTN.     On arrival, patient was febrile to 102.9 and tachycardic to 140. EKG with sinus tachycardia. Leukocytosis of 32, Na 146, BUN/Cr 36/2.19 with GFR 32. CXR with a large L sided PNA. Lactic acid initially 6.9 and repeat 5.8. Patient has received 2L NS bolus and zosyn in the ED. Will admit patient to the hospitalist service for further IV antibiotics and IVF resuscitation.       * No surgery found *      Hospital Course:   2/19 - Completing abx. Respiratory status has improved. However H/H has been falling. He has not had BM.  No hematemesis. Labs are microcytic but iron saturation 40%. No evidence of hemolysis. Giving dulcolax and hopefully we will have stool to test soon.  Has been too sick for GI eval but now well enough to consider if stools heme +.   02/21 Patient stable. FOBT  is negative. Tolerating PEG feedings. Will DC today to NH. Continue PEG feedings and wound care.        Goals of Care Treatment Preferences:  Code Status: Full Code      Consults:   Consults (From admission, onward)        Status Ordering Provider     Inpatient consult to Gastroenterology  Once        Provider:  Eliceo Salas MD    Completed WILLIAM LEDESMA JR     Inpatient consult to Registered Dietitian/Nutritionist  Once        Provider:  (Not yet assigned)    Completed WILLIAM LEDESMA JR     Inpatient consult to General Surgery  Once        Provider:  Kavon Johnson MD    Completed WILLIAM LEDESMA JR     Inpatient consult to Registered Dietitian/Nutritionist  Once        Provider:  (Not yet assigned)    Completed FARSHAD UMANZOR     Pharmacy to dose Vancomycin consult  Once        Provider:  (Not yet assigned)    Completed SEJAL CHACON          No new Assessment & Plan notes have been filed under this hospital service since the last note was generated.  Service: Hospital Medicine    Final Active Diagnoses:    Diagnosis Date Noted POA    PRINCIPAL PROBLEM:  Aspiration pneumonia of left lower lobe [J69.0] 09/17/2021 Yes    Complication of gastrostomy tube [K94.20] 02/17/2022 No    Microcytic anemia [D50.9] 02/17/2022 Yes    Acute superficial gastritis without hemorrhage [K29.00]  No    Dysphagia [R13.10] 02/12/2022 Yes    Sacral wound [S31.000A] 02/12/2022 Yes    EDITH (acute kidney injury) [N17.9]  Yes      Problems Resolved During this Admission:    Diagnosis Date Noted Date Resolved POA    Hypernatremia [E87.0] 02/12/2022 02/16/2022 Yes       Discharged Condition: stable    Disposition:     Follow Up:    Patient Instructions:   No discharge procedures on file.    Significant Diagnostic Studies: none    Pending Diagnostic Studies:     Procedure Component Value Units Date/Time    CBC Auto Differential [[id number]] Collected: 02/21/22 0838    Order Status: Sent Lab Status: In process  Updated: 02/21/22 0848    Specimen: Blood     Narrative:      The following orders were created for panel order CBC Auto Differential.  Procedure                               Abnormality         Status                     ---------                               -----------         ------                     CBC with Differential[[id number]]                            In process                   Please view results for these tests on the individual orders.    CBC with Differential [[id number]] Collected: 02/21/22 0838    Order Status: Sent Lab Status: In process Updated: 02/21/22 0848    Specimen: Blood     EXTRA TUBES [[id number]]     Order Status: Sent Lab Status: No result     Specimen: Blood, Venous     Narrative:      The following orders were created for panel order EXTRA TUBES.  Procedure                               Abnormality         Status                     ---------                               -----------         ------                     Red Top Hold[[id number]]                                                                Light Green Top Hold[[id number]]                                                          Please view results for these tests on the individual orders.    Occult blood x 1, stool [[id number]]     Order Status: Sent Lab Status: No result     Specimen: Stool     Red Top Hold [[id number]]     Order Status: Sent Lab Status: No result     Specimen: Blood, Venous          Medications:  Reconciled Home Medications:      Medication List      CONTINUE taking these medications    acetaminophen 500 MG tablet  Commonly known as: TYLENOL  500 mg by Per G Tube route every 4 (four) hours as needed for Pain. Give 500 mg via PEG-Tube every four hours as needed for fever     benztropine 1 MG tablet  Commonly known as: COGENTIN  0.5 mg by Per G Tube route 2 (two) times daily.     cyclobenzaprine 10 MG tablet  Commonly known as: FLEXERIL  10 mg by Per G Tube route 2 (two) times daily as needed for  Muscle spasms.     donepeziL 10 MG tablet  Commonly known as: ARICEPT  Take 1 tablet (10 mg total) by mouth every evening.     folic acid 1 MG tablet  Commonly known as: FOLVITE  1 mg by Per G Tube route once daily.     metoprolol succinate 25 MG 24 hr tablet  Commonly known as: TOPROL-XL  Take 25 mg by mouth once daily.     multivit-min-ferrous gluconate 9 mg iron/15 mL oral liquid  Take 15 mLs by mouth once daily.     omeprazole 20 MG capsule  Commonly known as: PRILOSEC  20 mg once daily.     silver sulfADIAZINE 1% 1 % cream  Commonly known as: SILVADENE  Apply topically 2 (two) times daily. Apply to sacral wound two times daily     traZODone 50 MG tablet  Commonly known as: DESYREL  Take 25 mg by mouth 3 (three) times daily.            Indwelling Lines/Drains at time of discharge:   Lines/Drains/Airways     Drain  Duration                Gastrostomy/Enterostomy 09/20/21 1214 Percutaneous endoscopic gastrostomy (PEG) LUQ feeding 153 days         Urethral Catheter 02/14/22 1600 Coude 6 days                Time spent on the discharge of patient: 35 minutes         Ramu Rhoades MD  Department of Hospital Medicine  [facility] - [address] [place] [address]

## 2022-02-21 NOTE — PLAN OF CARE
Per MD pt d/c ready. Called Eliezer at [facility] to confirm pt can return today. Packet already on floor. MD informed pt ok to d/c back to NH today.

## 2022-02-21 NOTE — PROGRESS NOTES
[facility] - [address] [place] [address] Medicine  Progress Note    Patient Name: Owen Solis  MRN: [id number]  Patient Class: IP- Inpatient   Admission Date: 2/11/2022  Length of Stay: 9 days  Attending Physician: Yfn Richardson Jr., MD  Primary Care Provider: Angelina Jaramillo II, MD        Subjective:     Principal Problem:Aspiration pneumonia of left lower lobe        HPI:    Mr. Solis is a 60yo M who presents from [facility] with shortness of breath and hypoxia.     The patient was recently discharged from [facility] on 2/7 after being treated for UTI, HCAP believed to be secondary to aspiration, and dehydration. He presents similarly today. Patient has had failure to thrive post-COVID and currently has a PEG for tube feedings. During last admission, patient was suctioned and food remnants were found. Upon suctioning on arrival today they were found again. Patient has known dysphagia only receiving PEG feeds and was unable to pass a swallow evaluation during the last hospitalization. Patient turns his head from me during exam but does not otherwise respond. Patient has a PMH of Alzheimer's dementia, COVID-19, HTN.     On arrival, patient was febrile to 102.9 and tachycardic to 140. EKG with sinus tachycardia. Leukocytosis of 32, Na 146, BUN/Cr 36/2.19 with GFR 32. CXR with a large L sided PNA. Lactic acid initially 6.9 and repeat 5.8. Patient has received 2L NS bolus and zosyn in the ED. Will admit patient to the hospitalist service for further IV antibiotics and IVF resuscitation.       Overview/Hospital Course:  2/19 - Completing abx. Respiratory status has improved. However H/H has been falling. He has not had BM.  No hematemesis. Labs are microcytic but iron saturation 40%. No evidence of hemolysis. Giving dulcolax and hopefully we will have stool to test soon.  Has been too sick for GI eval but now well enough to consider if stools heme +.       Interval History:  No reported problems in this non  verbal patient. Stool was brown and heme negative.     Review of Systems   Unable to perform ROS: Patient nonverbal   Objective:     Vital Signs (Most Recent):  Temp: 97.5 °F (36.4 °C) (02/20/22 1603)  Pulse: (!) 111 (02/20/22 1603)  Resp: 20 (02/20/22 1603)  BP: 126/78 (02/20/22 1603)  SpO2: 99 % (02/20/22 1603)   Vital Signs (24h Range):  Temp:  [97.5 °F (36.4 °C)-99.6 °F (37.6 °C)] 97.5 °F (36.4 °C)  Pulse:  [109-117] 111  Resp:  [18-24] 20  SpO2:  [97 %-100 %] 99 %  BP: (108-131)/(74-84) 126/78     Weight: 71.2 kg (156 lb 15.5 oz)  Body mass index is 23.87 kg/m².    Intake/Output Summary (Last 24 hours) at 2/20/2022 1911  Last data filed at 2/20/2022 1600  Gross per 24 hour   Intake 0 ml   Output 3400 ml   Net -3400 ml      Physical Exam  Vitals reviewed.   Constitutional:       General: He is not in acute distress.     Appearance: He is not ill-appearing or toxic-appearing.   HENT:      Nose: Nose normal.   Eyes:      General: No scleral icterus.  Cardiovascular:      Rate and Rhythm: Normal rate and regular rhythm.      Heart sounds: Normal heart sounds.   Pulmonary:      Effort: Pulmonary effort is normal. No respiratory distress.      Breath sounds: No wheezing or rhonchi.   Abdominal:      General: Abdomen is flat. There is no distension.      Palpations: Abdomen is soft.      Tenderness: There is no abdominal tenderness.   Skin:     General: Skin is warm and dry.   Neurological:      Mental Status: He is alert. Mental status is at baseline.       Significant Labs: All pertinent labs within the past 24 hours have been reviewed.  CBC:   Recent Labs   Lab 02/19/22  0337 02/20/22  1552   WBC 11.07* 11.88*   HGB 6.9* 9.3*   HCT 20.4* 27.1*    239     CMP:   Recent Labs   Lab 02/19/22  0337 02/20/22  1458    143   K 4.2 4.2   * 106   CO2 26 27   * 124*   BUN 30* 28*   CREATININE 1.00 0.98   CALCIUM 9.5 10.2*   ANIONGAP 13 14   EGFRNONAA 80 82       Significant Imaging: I have reviewed  all pertinent imaging results/findings within the past 24 hours.      Assessment/Plan:      * Aspiration pneumonia of left lower lobe  Large L lobar PNA on CXR. Patient hypoxic requiring supplemental O2. Febrile to 102.9 with leukocytosis of 32 on admission. Lactic acid 6.9 -> 5.8. Concern for aspiration as food particles were suctioned out on arrival.      - Abx: Vancomycin, cefepime, clindamycin, levaquin. Completed 7 days.   - Blood cultures no growth   - Supplemental oxygen PRN  - NPO   - tube feeding resumed.   - Improved    Microcytic anemia    Iron studies . 40% saturation. Iron 39 with TIBC of 98. Less likely RAMO. Ferritin elevated, could represent adequate iron  stores and/or acute phase reactant from his illness. On Protonix. No anticoagulation.   Alpha thal in differential.  Monitor for evidence of bleeding.        Received another unit of blood. Hemoglobin now 9.3. Stool heme neg. LDH normal, haptoglobin high which argues against hemolysis. I do suspect a-thal as cause of his microcytic anemia.    Sacral wound  - Wound care consulted for this unstageable wound. Bedside debridement performed by surgery. Wound care following.     EDITH (acute kidney injury)  EDITH likely secondary to dehydration. Improved with fluids    Dysphagia  - Patient with known dysphagia and continued concern for aspiration PNA   - PEG in place with feedings going. Increased free water flushes.   - NPO     Complication of gastrostomy tube    G- tube began leaking after my visit yesterday, replaced by Dr. Salas.        VTE Risk Mitigation (From admission, onward)         Ordered     heparin (porcine) injection 5,000 Units  Every 12 hours         02/12/22 0203     IP VTE LOW RISK PATIENT  Once         02/12/22 0203     Place sequential compression device  Until discontinued         02/12/22 0203                Discharge Planning   ESTEFANY:      Code Status: Full Code   Is the patient medically ready for discharge?:     Reason for patient  still in hospital (select all that apply): Treatment  Discharge Plan A: Return to nursing home                  Yfn Richardson Jr, MD  Department of Hospital Medicine   [facility] - [address] [place] [address]

## 2022-06-05 NOTE — PROGRESS NOTES
[address] Medicine  Progress Note    Patient Name: Owen Solis  MRN: [id number]  Patient Class: IP- Inpatient   Admission Date: 1/29/2022  Length of Stay: 8 days  Attending Physician: Freya Grant,*  Primary Care Provider: Angelina Jaramillo II, MD        Subjective:     Principal Problem:UTI (urinary tract infection)        HPI:  Owen Solis is a 61 y.o.m. with a PMHx of HTN, Alzheimer's Dz, metabolic encephalopathy, OA, and dementia that presents to [facility] ED from [facility] for fever, tachypnea, HTN, hypoxia and AMS. In the ED UA was positive for UTI and CXR show bibasilar atelectasis/infiltrates. ED nurse stated patient came into ED with upton cath in place that contain an extensive amount of sediment. Patient was admitted to inpatient family medicine for further care and treatment. According to [facility] patient was ambulatory and could talk, although inappropriate wording, until he contracted COVID-19 in August 2021. Since then patient has been nonverbal and nonambulatory. Patient is fed through a PEG tube due to not being able to swallow. According to NH patient has had to be admitted to the hospital several times recently for PNA. NH also state patient is Full code as did patient's sister. Patient's sister was contacted via phone and she verified what the NH reported.       Overview/Hospital Course:  No notes on file    Interval History: Patient is more responsive and alert today. The patient's sacral ulcer is softer today after wound care. Patient's WBC count continues to decline and the patient has not had a fever overnight. A upton was placed today to prevent further breakdown and infection of the sacral wound. Will consider discharging to the [facility] tomorrow if WBC continues to decline.     Review of Systems   Unable to perform ROS: Patient nonverbal     Objective:     Vital Signs (Most Recent):  Temp: 98.8 °F (37.1 °C) (02/06/22  1030)  Pulse: (!) 113 (02/06/22 1030)  Resp: 18 (02/06/22 1030)  BP: 106/72 (02/06/22 1030)  SpO2: 100 % (02/06/22 1030) Vital Signs (24h Range):  Temp:  [97.3 °F (36.3 °C)-98.8 °F (37.1 °C)] 98.8 °F (37.1 °C)  Pulse:  [] 113  Resp:  [18-20] 18  SpO2:  [95 %-100 %] 100 %  BP: (106-158)/(60-94) 106/72     Weight: 71.6 kg (157 lb 13.6 oz)  Body mass index is 24 kg/m².    Intake/Output Summary (Last 24 hours) at 2/6/2022 1239  Last data filed at 2/6/2022 0500  Gross per 24 hour   Intake 2230 ml   Output --   Net 2230 ml      Physical Exam  Vitals and nursing note reviewed.   Constitutional:       Appearance: He is not ill-appearing, toxic-appearing or diaphoretic.   HENT:      Head: Normocephalic and atraumatic.      Comments: Poor oral hygiene     Nose: Nose normal. No congestion or rhinorrhea.      Mouth/Throat:      Pharynx: No oropharyngeal exudate.   Eyes:      Pupils: Pupils are equal, round, and reactive to light.   Cardiovascular:      Rate and Rhythm: Normal rate and regular rhythm.      Pulses: Normal pulses.      Heart sounds: Normal heart sounds. No murmur heard.  No friction rub.   Pulmonary:      Effort: Pulmonary effort is normal. No respiratory distress.      Breath sounds: Normal breath sounds. No wheezing, rhonchi or rales.   Abdominal:      Tenderness: There is no abdominal tenderness. There is no guarding.   Musculoskeletal:      Right lower leg: No edema.      Left lower leg: No edema.   Skin:     General: Skin is warm.      Capillary Refill: Capillary refill takes less than 2 seconds.      Findings: Lesion (Sacral ulcer and skin breakdown around PEG tube; LT hip skin breakdown ) present.   Neurological:      Mental Status: He is alert.   Psychiatric:         Attention and Perception: He is inattentive.         Mood and Affect: Affect is blunt.         Speech: He is noncommunicative.         Behavior: Behavior normal.         Significant Labs: All pertinent labs within the past 24 hours have  been reviewed.    Significant Imaging: I have reviewed all pertinent imaging results/findings within the past 24 hours.      Assessment/Plan:      * UTI (urinary tract infection)  U/A  Positive for UTI.  - Rocephin 2g x1 dose in ED  - Urine Cx grew ESBL    2/6: WBC decreasing to 13.49 from 14.88 on Vancomycin and Zosyn. No fever noted.     Dehydration  - Continue to monitor BMP   - Increase free water flushes via PEG tube; increased from 250cc to 350cc  - Na today is 150; continues to improve     Healthcare-associated pneumonia  Patient came from [facility]; will be D/C to [place]   - Vancomycin (Day 7)   - Zosyn 4.5g Q8H (Day 7)  - Blood Cx x2 no growth   - Supplemental O2   - telemetry   - Duo nebs Q8H  - Patient on NC 3L    2/6: Improving. Lung sounds better. Leukocytosis resolving. Afebrile overnight. Continue antibiotics as above.    Dysphagia  - PEG tube placed, intact  - Patient getting 1/2NS with 5% dextrose as continuous fluids   - PEG tube is feed at a rate of 40ml/hr with 350cc FWF q2hrs      Hypernatremia  - Na level is 150 today, continues to improve  - FWF at 350cc q 2 hours      Alzheimer disease  - Donepezil 10mg QD  - Benztropine 0.5 BID  - family wants patient to be switched to [facility] after discharge, PT/OT ordered, COVID test and TB pending       Primary hypertension  Holding Metoprolol succinate 25mg QD due to HoTN  - Continue to monitor       OA (osteoarthritis)  - Flexeril 10mg BID  - acetaminophen PRN     Depression  Trazodone 50mg BID        GERD (gastroesophageal reflux disease)  Protonix 40mg IV QD      Elevated liver enzymes  On admission  and ; Alk Phos 112  - fluids and monitor CMP daily  2/2: Ordering a CMP to check liver enzymes tomorrow   2/4: ALT 84, AST40, Alk Phos is 106 (Have trended down since initial CMP)     Leukocytosis  - WBC count today is 13.49; continues to improve   - Patient continues to be on Vancomycin and Zosyn   - Blood cultures  negative   - CXR: no significant changes   - Treated for UTI       Pressure ulcer of left leg, stage 2  - Wound Care consulted - appreciate recommendations       Pressure injury of left hip, stage 1  - Wound care consulted - appreciate recommendations       Pressure injury, unstageable, with suspected deep tissue injury  - Wound Care consulted - appreciate recommendations   - Eschar noted on the sacrum; are not pursuing general surgery consult;     Microcytic anemia  H/H: 8.9/26.5  - MCV: 75.7          VTE Risk Mitigation (From admission, onward)         Ordered     IP VTE LOW RISK PATIENT  Once         01/29/22 1428     Place sequential compression device  Until discontinued         01/29/22 1428                Discharge Planning   ESTEFANY:      Code Status: Full Code   Is the patient medically ready for discharge?:     Reason for patient still in hospital (select all that apply): Treatment  Discharge Plan A: Skilled Nursing Facility                  Judith Valdez MD  Department of Hospital Medicine   [address]   Moderna dose 1, 2, and 3

## (undated) DEVICE — GLOVE SURGICAL PROTEXIS PI SIZE 7.5

## (undated) DEVICE — Device

## (undated) DEVICE — GLOVE SURGICAL PROTEXIS PI BLUE SIZE 8.0

## (undated) DEVICE — SUTURE ETHILON 2-0 18 BLACK FS

## (undated) DEVICE — SUTURE SILK 2-0 FS 18 BLACK

## (undated) DEVICE — GLOVE SURGICAL PROTEXIS PI SIZE 6

## (undated) DEVICE — APPLICATOR CHLORAPREP HI-LITE TINTED ORANGE 26ML

## (undated) DEVICE — CDS BASIC

## (undated) DEVICE — GLOVE SURGICAL PROTEXIS PI BLUE SIZE 6.5